# Patient Record
Sex: FEMALE | Race: WHITE | NOT HISPANIC OR LATINO | URBAN - METROPOLITAN AREA
[De-identification: names, ages, dates, MRNs, and addresses within clinical notes are randomized per-mention and may not be internally consistent; named-entity substitution may affect disease eponyms.]

---

## 2017-01-25 DIAGNOSIS — I10 BENIGN ESSENTIAL HYPERTENSION: Primary | ICD-10-CM

## 2017-01-26 RX ORDER — LISINOPRIL 40 MG/1
40 TABLET ORAL DAILY
Qty: 30 TABLET | Refills: 0 | Status: SHIPPED
Start: 2017-01-26 | End: 2017-04-10

## 2017-02-03 ENCOUNTER — ALLIED HEALTH/NURSE VISIT (OUTPATIENT)
Dept: FAMILY MEDICINE | Facility: CLINIC | Age: 37
End: 2017-02-03

## 2017-02-03 VITALS
DIASTOLIC BLOOD PRESSURE: 86 MMHG | BODY MASS INDEX: 54.42 KG/M2 | SYSTOLIC BLOOD PRESSURE: 135 MMHG | TEMPERATURE: 98 F | WEIGHT: 293 LBS

## 2017-02-03 NOTE — NURSING NOTE
I administered the following to Ella Briggs.    MEDICATION: Medroxyprogesterone 150 mg  ROUTE: IM  SITE: Deltoid - Right  DOSE: 150 mg per mL  LOT #: [id number]  :  "[employer]   EXPIRATION DATE:  04/2020  NDC#: [address]-2     Was entire vial of medication used? Yes    Name of provider who requested the injection: Dr. Vargas  Name of provider on site at the time of performing the injection: Dr. Tristen Rajan, [facility]    Next Depo Due April 21st - May 5th 2017 Reminder Card was given to patient

## 2017-02-06 ENCOUNTER — TELEPHONE (OUTPATIENT)
Dept: FAMILY MEDICINE | Facility: CLINIC | Age: 37
End: 2017-02-06

## 2017-02-06 NOTE — TELEPHONE ENCOUNTER
Received message from Ms. Briggs requesting call from me (per Dr. Jasso). The message from Dr. Jasso indicated that she wanted to discuss a concern related to her significant other (Vinod, with whom I have provided consultation in the past).  I have also provided brief consultation to Ms. Briggs in the past, but not since 11/15/13. Reached busy signal on cell phone number.  Left brief, nondescript message on home number (from [facility]) that I was returning her call and that she could call back if she would like.

## 2017-03-14 DIAGNOSIS — I10 BENIGN ESSENTIAL HYPERTENSION: ICD-10-CM

## 2017-03-15 RX ORDER — HYDROCHLOROTHIAZIDE 25 MG/1
25 TABLET ORAL DAILY
Qty: 30 TABLET | Refills: 0 | Status: SHIPPED | OUTPATIENT
Start: 2017-03-15 | End: 2017-04-10

## 2017-04-10 DIAGNOSIS — I10 BENIGN ESSENTIAL HYPERTENSION: ICD-10-CM

## 2017-04-12 RX ORDER — HYDROCHLOROTHIAZIDE 25 MG/1
25 TABLET ORAL DAILY
Qty: 30 TABLET | Refills: 0 | Status: SHIPPED | OUTPATIENT
Start: 2017-04-12 | End: 2017-05-26

## 2017-04-12 RX ORDER — LISINOPRIL 40 MG/1
40 TABLET ORAL DAILY
Qty: 30 TABLET | Refills: 0 | Status: SHIPPED | OUTPATIENT
Start: 2017-04-12 | End: 2017-05-26

## 2017-04-21 ENCOUNTER — ALLIED HEALTH/NURSE VISIT (OUTPATIENT)
Dept: FAMILY MEDICINE | Facility: CLINIC | Age: 37
End: 2017-04-21

## 2017-04-21 VITALS
SYSTOLIC BLOOD PRESSURE: 132 MMHG | DIASTOLIC BLOOD PRESSURE: 84 MMHG | BODY MASS INDEX: 54.06 KG/M2 | WEIGHT: 293 LBS | HEART RATE: 108 BPM | TEMPERATURE: 97.7 F

## 2017-04-21 NOTE — NURSING NOTE
The following medication was given:     MEDICATION: Medroxyprogesterone 150 mg  ROUTE: IM  SITE: Deltoid - Left  DOSE: 1ml  LOT #: [id number]  :  [employer]   EXPIRATION DATE:  12/2020  NDC#: [address]-6   Medication administered by: Hernan Hollingsworth CMA  Pt on time, depo was given. Pt was told to schedule appt to see her PCP before her next depo in July, pt understood. Next depo due is 7/7/17-7/21/17. Reminder card was given to pt.     Dr. English was available on site at the time of this service.

## 2017-04-21 NOTE — MR AVS SNAPSHOT
After Visit Summary   2017    Ella Briggs    MRN: [id number]           Patient Information     Date Of Birth          1980        Visit Information        Provider Department      2017 4:15 PM Nurse, [facility]        Today's Diagnoses     Contraception    -  1       Follow-ups after your visit        Who to contact     Please call your clinic at [phone] to:    Ask questions about your health    Make or cancel appointments    Discuss your medicines    Learn about your test results    Speak to your doctor   If you have compliments or concerns about an experience at your clinic, or if you wish to file a complaint, please contact [facility] Patient Relations at [phone] or email us at [email or web address]         Additional Information About Your Visit        MyChart Information     Enpirion is an electronic gateway that provides easy, online access to your medical records. With Enpirion, you can request a clinic appointment, read your test results, renew a prescription or communicate with your care team.     To sign up for Aproposet visit the website at [email or web address] [email or web address]   You will be asked to enter the access code listed below, as well as some personal information. Please follow the directions to create your username and password.     Your access code is: [email or web address]  Expires: 2017  4:33 PM     Your access code will  in 90 days. If you need help or a new code, please contact your [facility] or call [phone] for assistance.        Care EveryWhere ID     This is your Care EveryWhere ID. This could be used by other organizations to access your [place] medical records  [id number]        Your Vitals Were     Pulse Temperature BMI (Body Mass Index)             108 97.7  F (36.5  C) (Oral) 54.06 kg/m2          Blood Pressure from Last 3 Encounters:   17 132/84   17  135/86   11/08/16 127/90    Weight from Last 3 Encounters:   04/21/17 (!) 336 lb 6.4 oz (152.6 kg)   02/03/17 (!) 338 lb 9.6 oz (153.6 kg)   11/08/16 (!) 340 lb (154.2 kg)              We Performed the Following     INJECTION INTRAMUSCULAR OR SUB-Q     medroxyPROGESTERone (DEPO-PROVERA) injection 150 mg (Charge)        Primary Care Provider Office Phone # Fax #    Ousmane Torrez -[phone]       [address] [address]        Thank you!     Thank you for choosing [facility]  for your care. Our goal is always to provide you with excellent care. Hearing back from our patients is one way we can continue to improve our services. Please take a few minutes to complete the written survey that you may receive in the mail after your visit with us. Thank you!             Your Updated Medication List - Protect others around you: Learn how to safely use, store and throw away your medicines at www.disposemymeds.org.          This list is accurate as of: 4/21/17  4:33 PM.  Always use your most recent med list.                   Brand Name Dispense Instructions for use    eucerin cream     500 g    Apply  topically as needed for dry skin.       FLEXERIL PO      Take as needed as directed       hydrochlorothiazide 25 MG tablet    HYDRODIURIL    30 tablet    Take 1 tablet (25 mg) by mouth daily       IBUPROFEN PO          lisinopril 40 MG tablet    PRINIVIL/ZESTRIL    30 tablet    Take 1 tablet (40 mg) by mouth daily       medroxyPROGESTERone 150 MG/ML injection    DEPO-PROVERA    0.9 mL    Inject 1 mL into the muscle every 3 months.       order for DME     1 Device    Equipment being ordered: LARGE bp cuff       ranitidine 150 MG tablet    ZANTAC    180 tablet    Take 1 tablet (150 mg) by mouth 2 times daily

## 2017-05-26 DIAGNOSIS — I10 BENIGN ESSENTIAL HYPERTENSION: ICD-10-CM

## 2017-05-26 RX ORDER — LISINOPRIL 40 MG/1
40 TABLET ORAL DAILY
Qty: 30 TABLET | Refills: 0 | Status: SHIPPED | OUTPATIENT
Start: 2017-05-26 | End: 2017-07-19

## 2017-05-26 RX ORDER — HYDROCHLOROTHIAZIDE 25 MG/1
25 TABLET ORAL DAILY
Qty: 30 TABLET | Refills: 0 | Status: SHIPPED | OUTPATIENT
Start: 2017-05-26 | End: 2017-07-19

## 2017-07-19 ENCOUNTER — OFFICE VISIT (OUTPATIENT)
Dept: FAMILY MEDICINE | Facility: CLINIC | Age: 37
End: 2017-07-19

## 2017-07-19 VITALS
TEMPERATURE: 98.7 F | OXYGEN SATURATION: 99 % | BODY MASS INDEX: 54.16 KG/M2 | HEART RATE: 84 BPM | WEIGHT: 293 LBS | SYSTOLIC BLOOD PRESSURE: 115 MMHG | DIASTOLIC BLOOD PRESSURE: 82 MMHG

## 2017-07-19 DIAGNOSIS — Z30.42 ENCOUNTER FOR SURVEILLANCE OF INJECTABLE CONTRACEPTIVE: ICD-10-CM

## 2017-07-19 DIAGNOSIS — I10 BENIGN ESSENTIAL HYPERTENSION: ICD-10-CM

## 2017-07-19 DIAGNOSIS — G89.29 CHRONIC LOW BACK PAIN, UNSPECIFIED BACK PAIN LATERALITY, WITH SCIATICA PRESENCE UNSPECIFIED: Primary | ICD-10-CM

## 2017-07-19 DIAGNOSIS — M54.5 CHRONIC LOW BACK PAIN, UNSPECIFIED BACK PAIN LATERALITY, WITH SCIATICA PRESENCE UNSPECIFIED: Primary | ICD-10-CM

## 2017-07-19 DIAGNOSIS — E66.9 OBESITY, UNSPECIFIED OBESITY SEVERITY, UNSPECIFIED OBESITY TYPE: ICD-10-CM

## 2017-07-19 RX ORDER — LISINOPRIL 40 MG/1
20 TABLET ORAL DAILY
Qty: 45 TABLET | Refills: 1 | Status: SHIPPED | OUTPATIENT
Start: 2017-07-19 | End: 2018-02-08

## 2017-07-19 RX ORDER — PHENTERMINE HYDROCHLORIDE 15 MG/1
15 CAPSULE ORAL EVERY MORNING
Qty: 30 CAPSULE | Refills: 0 | Status: SHIPPED | OUTPATIENT
Start: 2017-07-19 | End: 2017-10-11

## 2017-07-19 RX ORDER — ACETAMINOPHEN 325 MG/1
325 TABLET ORAL EVERY 4 HOURS PRN
Qty: 100 TABLET | Refills: 0 | Status: SHIPPED | OUTPATIENT
Start: 2017-07-19 | End: 2017-11-03

## 2017-07-19 RX ORDER — HYDROCHLOROTHIAZIDE 25 MG/1
25 TABLET ORAL DAILY
Qty: 30 TABLET | Refills: 0 | Status: CANCELLED | OUTPATIENT
Start: 2017-07-19

## 2017-07-19 NOTE — NURSING NOTE
I administered the following to Ella Briggs.    MEDICATION: Depo Provera 150mg  ROUTE: IM  SITE: Deltoid - Right  DOSE: 150 mg  LOT #: [id number]  :  [employer]   EXPIRATION DATE:  01/2022  NDC#: [address]-8   Next injection due Oct 4 - 18, 2017. Reminder card given.  Was entire vial of medication used? Yes  Name of provider who requested the injection: Dr. English  Name of provider on site (faculty or community preceptor) at the time of performing the injection: Dr. Amador Briceño, [facility]

## 2017-07-19 NOTE — MR AVS SNAPSHOT
After Visit Summary   7/19/2017    Ella Briggs    MRN: [id number]           Patient Information     Date Of Birth          1980        Visit Information        Provider Department      7/19/2017 10:00 AM Nydia English MD [facility]        Today's Diagnoses     Chronic low back pain, unspecified back pain laterality, with sciatica presence unspecified    -  1    Benign essential hypertension        Obesity, unspecified obesity severity, unspecified obesity type          Care Instructions    Blood pressure: Goal less than 140/90  -- Try stopping the HCTZ (hydrochlorothiazide).  Take just the lisinopril half-pill (20 mg).  -- If your blood pressure goes above goal, then we have to add the HCTZ back in again.  -- If it stay under goal, then you'll just need the lisinopril.    Back pain:  Tell the  that you need a 40 minute chronic pain management (CPM) assessment with me.          Follow-ups after your visit        Who to contact     Please call your clinic at [phone] to:    Ask questions about your health    Make or cancel appointments    Discuss your medicines    Learn about your test results    Speak to your doctor   If you have compliments or concerns about an experience at your clinic, or if you wish to file a complaint, please contact [facility] Patient Relations at [phone] or email us at [email or web address]         Additional Information About Your Visit        MyChart Information     Beyond Gamest is an electronic gateway that provides easy, online access to your medical records. With Advanced Brain Monitoring, you can request a clinic appointment, read your test results, renew a prescription or communicate with your care team.     To sign up for Beyond Gamest visit the website at [email or web address]   You will be asked to enter the access code listed below, as well as some personal information. Please follow the directions to create your  username and password.     Your access code is: [email or web address]  Expires: 2017  4:33 PM     Your access code will  in 90 days. If you need help or a new code, please contact your [facility] or call [phone] for assistance.        Care EveryWhere ID     This is your Care EveryWhere ID. This could be used by other organizations to access your [place] medical records  [id number]        Your Vitals Were     Pulse Temperature Pulse Oximetry Breastfeeding? BMI (Body Mass Index)       84 98.7  F (37.1  C) (Oral) 99% No 54.16 kg/m2        Blood Pressure from Last 3 Encounters:   17 115/82   17 132/84   17 135/86    Weight from Last 3 Encounters:   17 (!) 337 lb (152.9 kg)   17 (!) 336 lb 6.4 oz (152.6 kg)   17 (!) 338 lb 9.6 oz (153.6 kg)              Today, you had the following     No orders found for display         Today's Medication Changes          These changes are accurate as of: 17 10:58 AM.  If you have any questions, ask your nurse or doctor.               Start taking these medicines.        Dose/Directions    acetaminophen 325 MG tablet   Commonly known as:  TYLENOL   Used for:  Chronic low back pain, unspecified back pain laterality, with sciatica presence unspecified   Started by:  Nydia English MD        Dose:  325 mg   Take 1 tablet (325 mg) by mouth every 4 hours as needed for mild pain   Quantity:  100 tablet   Refills:  0       phentermine 15 MG capsule   Used for:  Obesity, unspecified obesity severity, unspecified obesity type   Started by:  Nydia English MD        Dose:  15 mg   Take 1 capsule (15 mg) by mouth every morning   Quantity:  30 capsule   Refills:  0         Stop taking these medicines if you haven't already. Please contact your care team if you have questions.     hydrochlorothiazide 25 MG tablet   Commonly known as:  HYDRODIURIL   Stopped by:  Nydia English MD                Where to  get your medicines      [facility] [place] [place] - [address], [place] [address]     Phone:  [phone]     acetaminophen 325 MG tablet    lisinopril 40 MG tablet         Some of these will need a paper prescription and others can be bought over the counter.  Ask your nurse if you have questions.     Bring a paper prescription for each of these medications     phentermine 15 MG capsule                Primary Care Provider Office Phone # Fax #    Mau Jonathon Sosa [phone]       [facility] MEDICINE [address] [place] [address]        Equal Access to Services     [facility] CenterYENIFER : Hadii aad ku hadasho Soomaali, waaxda luqadaha, qaybta kaalmada adeegyada, junior watkins . So [facility] [phone].    ATENCIÓN: Si habla español, tiene a marlow disposición servicios gratuitos de asistencia lingüística. [facility] [phone].    We comply with applicable federal civil rights laws and [place] laws. We do not discriminate on the basis of race, color, national origin, age, disability sex, sexual orientation or gender identity.            Thank you!     Thank you for choosing [facility]  for your care. Our goal is always to provide you with excellent care. Hearing back from our patients is one way we can continue to improve our services. Please take a few minutes to complete the written survey that you may receive in the mail after your visit with us. Thank you!             Your Updated Medication List - Protect others around you: Learn how to safely use, store and throw away your medicines at www.disposemymeds.org.          This list is accurate as of: 7/19/17 10:58 AM.  Always use your most recent med list.                   Brand Name Dispense Instructions for use Diagnosis    acetaminophen 325 MG tablet    TYLENOL    100 tablet    Take 1 tablet (325 mg) by mouth every 4 hours as needed for mild  pain    Chronic low back pain, unspecified back pain laterality, with sciatica presence unspecified       eucerin cream     500 g    Apply  topically as needed for dry skin.    Dry skin       FLEXERIL PO      Take as needed as directed        IBUPROFEN PO           lisinopril 40 MG tablet    PRINIVIL/ZESTRIL    45 tablet    Take 0.5 tablets (20 mg) by mouth daily    Benign essential hypertension       medroxyPROGESTERone 150 MG/ML injection    DEPO-PROVERA    0.9 mL    Inject 1 mL into the muscle every 3 months.    Contraception       order for DME     1 Device    Equipment being ordered: LARGE bp cuff    Elevated blood pressure reading without diagnosis of hypertension       phentermine 15 MG capsule     30 capsule    Take 1 capsule (15 mg) by mouth every morning    Obesity, unspecified obesity severity, unspecified obesity type       ranitidine 150 MG tablet    ZANTAC    180 tablet    Take 1 tablet (150 mg) by mouth 2 times daily    Gastroesophageal reflux disease without esophagitis

## 2017-07-19 NOTE — PATIENT INSTRUCTIONS
Blood pressure: Goal less than 140/90  -- Try stopping the HCTZ (hydrochlorothiazide).  Take just the lisinopril half-pill (20 mg).  -- If your blood pressure goes above goal, then we have to add the HCTZ back in again.  -- If it stay under goal, then you'll just need the lisinopril.    Back pain:  Tell the  that you need a 40 minute chronic pain management (CPM) assessment with me.

## 2017-07-21 ENCOUNTER — TELEPHONE (OUTPATIENT)
Dept: FAMILY MEDICINE | Facility: CLINIC | Age: 37
End: 2017-07-21

## 2017-07-21 NOTE — TELEPHONE ENCOUNTER
PA needed for phentermine.   Would you like to fill out PA or send alternative?    Routed to Dr. English. /DEEPIKA Hannah

## 2017-07-25 NOTE — PROGRESS NOTES
Subjective   Ella Briggs is a 37 year old female with a history including hypertension and morbid obesity who presents for a blood pressure check.    Her blood pressure today is 115/82. Her home regimen is prescribed as HCTZ 25 and lisinopril 40. However, she found that this combination made her feel lightheaded, as if she might faint, so she is actually taking HCTZ 25 and half a pill of lisinopril (20 mg).  She has increased her exercise -- walking every day and doing exercise videos. She is taking over-the-counter diet pills and detoxifiers.  At home, her blood pressure is usually in the 110s/80s.  No chest pain, headache, or vision changes.    Today, she is also due for another Depo injection. Last injection was 4/21/17, so she is within the window today.    Finally, she also complains of some chronic back pain since 2014, with two MRIs previously done. She says that she was told she had degenerative spine disease. She has recently received injections, muscle relaxants, and Percocet.  No incontinence or worsening of chronic symptoms.  No fevers.    Social: Former smoker.    Objective   Vitals: /82  Pulse 84  Temp 98.7  F (37.1  C) (Oral)  Wt (!) 337 lb (152.9 kg)  SpO2 99%  Breastfeeding? No  BMI 54.16 kg/m2  General: Pleasant. Young woman. Obese. No distress.  HEENT: Normal fundoscopic exam.  Heart: Regular rate and rhythm. No murmurs, rubs, or gallops.  Extremities: No peripheral edema.  Lungs: Clear to auscultation bilaterally. No wheezes or crackles. Good air movement.    Labs reviewed:  Results for orders placed or performed in visit on 09/14/16   Basic Metabolic Profile ([facility])   Result Value Ref Range    Sodium 138 136 - 145 mmol/L    Potassium 3.7 3.5 - 5.0 mmol/L    Chloride 102 98 - 107 mmol/L    CO2, Total 24 22 - 31 mmol/L    Anion Gap 12 5 - 18 mmol/L    Glucose 100 70 - 125 mg/dL    Calcium 9.6 8.5 - 10.5 mg/dL    Urea Nitrogen 14 8 - 22 mg/dL    Creatinine 0.87 0.60 - 1.10  mg/dL    GFR Estimate If Black >60 >60 mL/min/1.73m2    GFR Estimate >60 >60 mL/min/1.73m2    Narrative    Test performed by:  [facility]  [address] [place] [address]  Fasting Glucose reference range is 70-99 mg/dL per  American Diabetes Association (ADA) guidelines.       Assessment & Plan   Routine blood pressure follow-up, with blood pressure today 115/82.  Currently taking HCTZ 25 and lisinopril 20.  -- Continue lisinopril 20. Discontinue HCTZ 25, given good pressures (no adverse effects).  If this results in BP above goal (140/90), then restart HCTZ.    Chronic low back pain.  -- Schedule visit for chronic pain managment and assessment.  -- Continue Tylenol as needed.  -- Obesity management.  Currently taking OTC diet pills.  Stop those, and will prescribe phentermine instead.  Start at 15 mg.    Return to clinic for CPM visit and BP recheck on med changes.  Check BMP then.

## 2017-08-27 ENCOUNTER — HEALTH MAINTENANCE LETTER (OUTPATIENT)
Age: 37
End: 2017-08-27

## 2017-10-11 ENCOUNTER — OFFICE VISIT (OUTPATIENT)
Dept: FAMILY MEDICINE | Facility: CLINIC | Age: 37
End: 2017-10-11

## 2017-10-11 VITALS
HEART RATE: 109 BPM | TEMPERATURE: 97.8 F | SYSTOLIC BLOOD PRESSURE: 135 MMHG | WEIGHT: 293 LBS | DIASTOLIC BLOOD PRESSURE: 81 MMHG | BODY MASS INDEX: 55.96 KG/M2

## 2017-10-11 DIAGNOSIS — Z30.42 ENCOUNTER FOR SURVEILLANCE OF INJECTABLE CONTRACEPTIVE: Primary | ICD-10-CM

## 2017-10-11 DIAGNOSIS — I10 BENIGN ESSENTIAL HYPERTENSION: ICD-10-CM

## 2017-10-11 DIAGNOSIS — R63.2 BINGE EATING: ICD-10-CM

## 2017-10-11 LAB
BUN SERPL-MCNC: 7.7 MG/DL (ref 7–19)
CALCIUM SERPL-MCNC: 9.2 MG/DL (ref 8.5–10.1)
CHLORIDE SERPLBLD-SCNC: 102.6 MMOL/L (ref 98–110)
CO2 SERPL-SCNC: 26.7 MMOL/L (ref 20–32)
CREAT SERPL-MCNC: 0.7 MG/DL (ref 0.5–1)
GFR SERPL CREATININE-BSD FRML MDRD: >90 ML/MIN/1.7 M2
GLUCOSE SERPL-MCNC: 114.1 MG'DL (ref 70–99)
POTASSIUM SERPL-SCNC: 3.8 MMOL/DL (ref 3.2–4.6)
SODIUM SERPL-SCNC: 137 MMOL/L (ref 132–142)

## 2017-10-11 RX ORDER — TOPIRAMATE 25 MG/1
25 TABLET, FILM COATED ORAL 2 TIMES DAILY
Qty: 60 TABLET | Refills: 1 | Status: SHIPPED | OUTPATIENT
Start: 2017-10-11 | End: 2017-11-07

## 2017-10-11 NOTE — LETTER
October 12, 2017      Ella Briggs  [address] [facility] [address]        Dear Ella,    Dear Ella,     We rechecked your sodium, potassium, kidneys because it's been more than a year, and we need to check this yearly for people on lisinopril.  Your results all looked good.  Your sugar was a little high (it was 114), but that could be up if you weren't fasting.     You don't need to make any changes to your medicine.     Nydia English MD     Please see below for your test results.    Resulted Orders   Basic Metabolic Panel ([facility])   Result Value Ref Range    Urea Nitrogen 7.7 7.0 - 19.0 mg/dL    Calcium 9.2 8.5 - 10.1 mg/dL    Chloride 102.6 98.0 - 110.0 mmol/L    Carbon Dioxide 26.7 20.0 - 32.0 mmol/L    Creatinine 0.7 0.5 - 1.0 mg/dL    Glucose 114.1 (H) 70.0 - 99.0 mg'dL    Potassium 3.8 3.2 - 4.6 mmol/dL    Sodium 137.0 132.0 - 142.0 mmol/L    GFR Estimate >90 >60.0 mL/min/1.7 m2    GFR Estimate If Black >90 >60.0 mL/min/1.7 m2

## 2017-10-11 NOTE — NURSING NOTE
I administered the following to Ella Briggs.    MEDICATION: Medroxyprogesterone 150 mg  ROUTE: IM  SITE: Deltoid - Right  DOSE: 150 mg/ mL  LOT #: [id number]  :  [employer]   EXPIRATION DATE:  01/2022  NDC#: [address]-2     Gave patient a reminder card to come back December 28, 2017 through January 10, 2018.     Was entire vial of medication used? Yes    Name of provider who requested the injection: Dr. English  Name of provider on site (faculty or community preceptor) at the time of performing the injection: Dr. Amador Traore, [facility]

## 2017-10-11 NOTE — MR AVS SNAPSHOT
After Visit Summary   10/11/2017    Ella Briggs    MRN: [id number]           Patient Information     Date Of Birth          1980        Visit Information        Provider Department      10/11/2017 3:10 PM Nydia English MD [facility]        Today's Diagnoses     Encounter for surveillance of injectable contraceptive    -  1    Binge eating        Benign essential hypertension           Follow-ups after your visit        Who to contact     Please call your clinic at [phone] to:    Ask questions about your health    Make or cancel appointments    Discuss your medicines    Learn about your test results    Speak to your doctor   If you have compliments or concerns about an experience at your clinic, or if you wish to file a complaint, please contact [facility] Patient Relations at [phone] or email us at [email or web address]         Additional Information About Your Visit        MyChart Information     HIRO Media is an electronic gateway that provides easy, online access to your medical records. With HIRO Media, you can request a clinic appointment, read your test results, renew a prescription or communicate with your care team.     To sign up for Micromusclet visit the website at [email or web address]   You will be asked to enter the access code listed below, as well as some personal information. Please follow the directions to create your username and password.     Your access code is: [email or web address]  Expires: 1/15/2018 10:30 PM     Your access code will  in 90 days. If you need help or a new code, please contact your [facility] or call [phone] for assistance.        Care EveryWhere ID     This is your Care EveryWhere ID. This could be used by other organizations to access your [place] medical records  [id number]        Your Vitals Were     Pulse Temperature BMI (Body Mass Index)             109 97.8   F (36.6  C) (Oral) 55.96 kg/m2          Blood Pressure from Last 3 Encounters:   10/11/17 135/81   07/19/17 115/82   04/21/17 132/84    Weight from Last 3 Encounters:   10/11/17 (!) 348 lb 3.2 oz (157.9 kg)   07/19/17 (!) 337 lb (152.9 kg)   04/21/17 (!) 336 lb 6.4 oz (152.6 kg)              We Performed the Following     Basic Metabolic Panel ([place])     INJECTION INTRAMUSCULAR OR SUB-Q     medroxyPROGESTERone (DEPO-PROVERA) injection 150 mg (Charge)          Today's Medication Changes          These changes are accurate as of: 10/11/17 11:59 PM.  If you have any questions, ask your nurse or doctor.               Start taking these medicines.        Dose/Directions    topiramate 25 MG tablet   Commonly known as:  TOPAMAX   Used for:  Binge eating   Started by:  Nydia English MD        Dose:  25 mg   Take 1 tablet (25 mg) by mouth 2 times daily   Quantity:  60 tablet   Refills:  1         Stop taking these medicines if you haven't already. Please contact your care team if you have questions.     IBUPROFEN PO   Stopped by:  Nydia English MD           phentermine 15 MG capsule   Stopped by:  Nydia English MD                Where to get your medicines      These medications were sent to [facility] - [place], [place] - [address], [place] [address]     Phone:  [phone]     topiramate 25 MG tablet                Primary Care Provider Office Phone # Fax #    Nydia English [phone]       [facility] [address] [place] [address]        Equal Access to Services     DAYNE SPARROW AH: Hadmagali Leon, agnes clement, qajunior simon. So [facility] [phone].    ATENCIÓN: Si habla español, tiene a marlow disposición servicios gratuitos de asistencia lingüística. Dong al [phone].    We comply with applicable federal civil rights laws and [place] laws. We do not  discriminate on the basis of race, color, national origin, age, disability, sex, sexual orientation, or gender identity.            Thank you!     Thank you for choosing [facility]  for your care. Our goal is always to provide you with excellent care. Hearing back from our patients is one way we can continue to improve our services. Please take a few minutes to complete the written survey that you may receive in the mail after your visit with us. Thank you!             Your Updated Medication List - Protect others around you: Learn how to safely use, store and throw away your medicines at www.disposemymeds.org.          This list is accurate as of: 10/11/17 11:59 PM.  Always use your most recent med list.                   Brand Name Dispense Instructions for use Diagnosis    acetaminophen 325 MG tablet    TYLENOL    100 tablet    Take 1 tablet (325 mg) by mouth every 4 hours as needed for mild pain    Chronic low back pain, unspecified back pain laterality, with sciatica presence unspecified       eucerin cream     500 g    Apply  topically as needed for dry skin.    Dry skin       FLEXERIL PO      Take as needed as directed        lisinopril 40 MG tablet    PRINIVIL/ZESTRIL    45 tablet    Take 0.5 tablets (20 mg) by mouth daily    Benign essential hypertension       medroxyPROGESTERone 150 MG/ML injection    DEPO-PROVERA    0.9 mL    Inject 1 mL into the muscle every 3 months.    Contraception       order for DME     1 Device    Equipment being ordered: LARGE bp cuff    Elevated blood pressure reading without diagnosis of hypertension       ranitidine 150 MG tablet    ZANTAC    180 tablet    Take 1 tablet (150 mg) by mouth 2 times daily    Gastroesophageal reflux disease without esophagitis       topiramate 25 MG tablet    TOPAMAX    60 tablet    Take 1 tablet (25 mg) by mouth 2 times daily    Binge eating

## 2017-10-11 NOTE — PROGRESS NOTES
Subjective   Ella Briggs is a 37 year old female with a history including obesity and HTN who presents for blood pressure recheck and to discuss weight loss.    Regarding her blood pressure, she is currently on lisinopril 20 mg daily.  This was decreased from lisinopril + HCTZ 25 at her last visit (HCTZ discontinued) because it seemed she only needed on medication.  Since this change, she feels like her blood pressures remains well-controlled.  She checks them at home, and highest is 130s/80s.  She denies headaches, vision changes, or chest pain.    BP Readings from Last 6 Encounters:   10/11/17 135/81   07/19/17 115/82   04/21/17 132/84   02/03/17 135/86   11/08/16 127/90   09/14/16 133/88     Regarding her weight, she has morbid obesity and she is motivated to lose weight via non-surgical options.  At the last visit, we started her on phentermine, but insurance would not cover it, so she never filled it.  Her weight is up another 10 lbs since the last visit 3 months ago.  She endorses problems with binge eating.    Wt Readings from Last 5 Encounters:   10/11/17 (!) 348 lb 3.2 oz (157.9 kg)   07/19/17 (!) 337 lb (152.9 kg)   04/21/17 (!) 336 lb 6.4 oz (152.6 kg)   02/03/17 (!) 338 lb 9.6 oz (153.6 kg)   11/08/16 (!) 340 lb (154.2 kg)       Social: Former smoker.    Objective   Vitals: /81  Pulse 109  Temp 97.8  F (36.6  C) (Oral)  Wt (!) 348 lb 3.2 oz (157.9 kg)  BMI 55.96 kg/m2  General: Pleasant. Young woman. Obese. No distress.  Heart: Regular rate and rhythm. No murmurs, rubs, or gallops.  Extremities: Extremities warm and well-perfused. No peripheral edema.  Lungs: Clear to auscultation bilaterally. No wheezes or crackles. Good air movement.    Labs:  Results for orders placed or performed in visit on 10/11/17   Basic Metabolic Panel ([place])   Result Value Ref Range    Urea Nitrogen 7.7 7.0 - 19.0 mg/dL    Calcium 9.2 8.5 - 10.1 mg/dL    Chloride 102.6 98.0 - 110.0 mmol/L    Carbon Dioxide  26.7 20.0 - 32.0 mmol/L    Creatinine 0.7 0.5 - 1.0 mg/dL    Glucose 114.1 (H) 70.0 - 99.0 mg'dL    Potassium 3.8 3.2 - 4.6 mmol/dL    Sodium 137.0 132.0 - 142.0 mmol/L    GFR Estimate >90 >60.0 mL/min/1.7 m2    GFR Estimate If Black >90 >60.0 mL/min/1.7 m2       Assessment & Plan   Follow-up BP.  On lisinopril 20.  HCTZ 25 discontinued at last visit.  BP remains well-controlled on pared back regimen.  Electrolytes normal on BMP.  -- Continue lisinopril 20.    Morbid obesity with binge eating component.  Phentermine not covered by insurance.  -- Start torpiramate 25 mg daily, titrating up to 25 mg BID.    Contraception.  -- Depo given today.    Return to clinic as needed.

## 2017-11-03 DIAGNOSIS — M54.5 CHRONIC LOW BACK PAIN, UNSPECIFIED BACK PAIN LATERALITY, WITH SCIATICA PRESENCE UNSPECIFIED: ICD-10-CM

## 2017-11-03 DIAGNOSIS — G89.29 CHRONIC LOW BACK PAIN, UNSPECIFIED BACK PAIN LATERALITY, WITH SCIATICA PRESENCE UNSPECIFIED: ICD-10-CM

## 2017-11-06 RX ORDER — ACETAMINOPHEN 325 MG/1
325 TABLET ORAL EVERY 4 HOURS PRN
Qty: 100 TABLET | Refills: 0 | Status: SHIPPED | OUTPATIENT
Start: 2017-11-06 | End: 2017-11-13

## 2017-11-07 DIAGNOSIS — R63.2 BINGE EATING: ICD-10-CM

## 2017-11-07 RX ORDER — TOPIRAMATE 25 MG/1
25 TABLET, FILM COATED ORAL 2 TIMES DAILY
Qty: 60 TABLET | Refills: 1 | Status: SHIPPED | OUTPATIENT
Start: 2017-11-07 | End: 2017-11-09

## 2017-11-09 DIAGNOSIS — R63.2 BINGE EATING: ICD-10-CM

## 2017-11-10 RX ORDER — TOPIRAMATE 25 MG/1
25 TABLET, FILM COATED ORAL 2 TIMES DAILY
Qty: 60 TABLET | Refills: 1 | Status: SHIPPED | OUTPATIENT
Start: 2017-11-10 | End: 2018-01-03

## 2017-11-13 DIAGNOSIS — G89.29 CHRONIC LOW BACK PAIN, UNSPECIFIED BACK PAIN LATERALITY, WITH SCIATICA PRESENCE UNSPECIFIED: ICD-10-CM

## 2017-11-13 DIAGNOSIS — M54.5 CHRONIC LOW BACK PAIN, UNSPECIFIED BACK PAIN LATERALITY, WITH SCIATICA PRESENCE UNSPECIFIED: ICD-10-CM

## 2017-11-14 RX ORDER — ACETAMINOPHEN 325 MG/1
325 TABLET ORAL EVERY 4 HOURS PRN
Qty: 100 TABLET | Refills: 0 | Status: SHIPPED | OUTPATIENT
Start: 2017-11-14 | End: 2018-05-15

## 2017-12-07 DIAGNOSIS — B85.2 LICE: Primary | ICD-10-CM

## 2018-01-03 DIAGNOSIS — R63.2 BINGE EATING: ICD-10-CM

## 2018-01-03 RX ORDER — TOPIRAMATE 25 MG/1
25 TABLET, FILM COATED ORAL 2 TIMES DAILY
Qty: 60 TABLET | Refills: 1 | Status: SHIPPED | OUTPATIENT
Start: 2018-01-03 | End: 2018-02-12

## 2018-01-09 ENCOUNTER — ALLIED HEALTH/NURSE VISIT (OUTPATIENT)
Dept: FAMILY MEDICINE | Facility: CLINIC | Age: 38
End: 2018-01-09
Payer: COMMERCIAL

## 2018-01-09 ENCOUNTER — TELEPHONE (OUTPATIENT)
Dept: FAMILY MEDICINE | Facility: CLINIC | Age: 38
End: 2018-01-09

## 2018-01-09 DIAGNOSIS — Z30.42 ENCOUNTER FOR SURVEILLANCE OF INJECTABLE CONTRACEPTIVE: ICD-10-CM

## 2018-01-09 NOTE — MR AVS SNAPSHOT
After Visit Summary   2018    Ella Briggs    MRN: [id number]           Patient Information     Date Of Birth          1980        Visit Information        Provider Department      2018 1:45 PM Nurse, [facility]        Today's Diagnoses     Contraception           Follow-ups after your visit        Who to contact     Please call your clinic at [phone] to:    Ask questions about your health    Make or cancel appointments    Discuss your medicines    Learn about your test results    Speak to your doctor   If you have compliments or concerns about an experience at your clinic, or if you wish to file a complaint, please contact [facility] Patient Relations at [phone] or email us at [email or web address]         Additional Information About Your Visit        MyChart Information     [employer] is an electronic gateway that provides easy, online access to your medical records. With [employer], you can request a clinic appointment, read your test results, renew a prescription or communicate with your care team.     To sign up for Jetabroadt visit the website at [email or web address]   You will be asked to enter the access code listed below, as well as some personal information. Please follow the directions to create your username and password.     Your access code is: [email or web address]  Expires: 1/15/2018  9:30 PM     Your access code will  in 90 days. If you need help or a new code, please contact your [facility] or call [phone] for assistance.        Care EveryWhere ID     This is your Care EveryWhere ID. This could be used by other organizations to access your [facility] medical records  [id number]        Your Vitals Were     Pulse Temperature Pulse Oximetry BMI (Body Mass Index)          88 98  F (36.7  C) 98% 55.28 kg/m2         Blood Pressure from Last 3 Encounters:   18 124/89   10/11/17  135/81   07/19/17 115/82    Weight from Last 3 Encounters:   01/09/18 (!) 344 lb (156 kg)   10/11/17 (!) 348 lb 3.2 oz (157.9 kg)   07/19/17 (!) 337 lb (152.9 kg)              We Performed the Following     INJECTION INTRAMUSCULAR OR SUB-Q     medroxyPROGESTERone (DEPO-PROVERA) injection 150 mg (Charge)        Primary Care Provider Office Phone # Fax #    Nydia POP English [phone]       [facility] [address] [place] [address]        Equal Access to Services     DAYNE SPARROW : Hadii marky Leon, waletyda urbano, qakathyta kaalmada ada, junior watkins . So [facility] [phone].    ATENCIÓN: Si habla español, tiene a marlow disposición servicios gratuitos de asistencia lingüística. Llame al [phone].    We comply with applicable federal civil rights laws and [place] laws. We do not discriminate on the basis of race, color, national origin, age, disability, sex, sexual orientation, or gender identity.            Thank you!     Thank you for choosing [facility]  for your care. Our goal is always to provide you with excellent care. Hearing back from our patients is one way we can continue to improve our services. Please take a few minutes to complete the written survey that you may receive in the mail after your visit with us. Thank you!             Your Updated Medication List - Protect others around you: Learn how to safely use, store and throw away your medicines at www.disposemymeds.org.          This list is accurate as of: 1/9/18 11:59 PM.  Always use your most recent med list.                   Brand Name Dispense Instructions for use Diagnosis    acetaminophen 325 MG tablet    TYLENOL    100 tablet    Take 1 tablet (325 mg) by mouth every 4 hours as needed for mild pain    Chronic low back pain, unspecified back pain laterality, with sciatica presence unspecified       eucerin cream     500 g    Apply  topically as needed for  dry skin.    Dry skin       FLEXERIL PO      Take as needed as directed        lisinopril 40 MG tablet    PRINIVIL/ZESTRIL    45 tablet    Take 0.5 tablets (20 mg) by mouth daily    Benign essential hypertension       medroxyPROGESTERone 150 MG/ML injection    DEPO-PROVERA    0.9 mL    Inject 1 mL into the muscle every 3 months.    Contraception       order for DME     1 Device    Equipment being ordered: LARGE bp cuff    Elevated blood pressure reading without diagnosis of hypertension       permethrin 1 % Liqd     60 mL    Apply to clean, towel-dried hair, saturate hair and scalp, wash off after 10 min.    Lice       ranitidine 150 MG tablet    ZANTAC    180 tablet    Take 1 tablet (150 mg) by mouth 2 times daily    Gastroesophageal reflux disease without esophagitis       topiramate 25 MG tablet    TOPAMAX    60 tablet    Take 1 tablet (25 mg) by mouth 2 times daily    Binge eating

## 2018-01-11 VITALS
SYSTOLIC BLOOD PRESSURE: 124 MMHG | TEMPERATURE: 98 F | BODY MASS INDEX: 55.28 KG/M2 | DIASTOLIC BLOOD PRESSURE: 89 MMHG | WEIGHT: 293 LBS | HEART RATE: 88 BPM | OXYGEN SATURATION: 98 %

## 2018-01-11 RX ORDER — MEDROXYPROGESTERONE ACETATE 150 MG/ML
150 INJECTION, SUSPENSION INTRAMUSCULAR
Qty: 0.9 ML | Refills: 3 | OUTPATIENT
Start: 2018-01-11 | End: 2021-08-03

## 2018-01-11 RX ORDER — MEDROXYPROGESTERONE ACETATE 150 MG/ML
150 INJECTION, SUSPENSION INTRAMUSCULAR
Qty: 0.9 ML | Status: CANCELLED | OUTPATIENT
Start: 2018-01-11

## 2018-01-11 NOTE — NURSING NOTE
The following medication was given:     MEDICATION: Medroxyprogesterone 150 mg  ROUTE: IM  SITE: RUQ - Gluteus  DOSE: 150mg  LOT #: [id number]  :  [employer]   EXPIRATION DATE:  01/2022  NDC#: [address]-6   Medication administered by: Lottie Livingston CMA    Pt is on time for her Depo.  Depo given.  Reminder card given and patient to return 3/27/18 - 4/24/18.    Dr. Khan was available on site at the time of this service.

## 2018-02-08 DIAGNOSIS — I10 BENIGN ESSENTIAL HYPERTENSION: ICD-10-CM

## 2018-02-08 RX ORDER — LISINOPRIL 40 MG/1
20 TABLET ORAL DAILY
Qty: 45 TABLET | Refills: 1 | Status: SHIPPED | OUTPATIENT
Start: 2018-02-08 | End: 2018-08-07

## 2018-02-12 ENCOUNTER — OFFICE VISIT (OUTPATIENT)
Dept: FAMILY MEDICINE | Facility: CLINIC | Age: 38
End: 2018-02-12
Payer: COMMERCIAL

## 2018-02-12 VITALS
TEMPERATURE: 98.4 F | HEART RATE: 79 BPM | WEIGHT: 293 LBS | DIASTOLIC BLOOD PRESSURE: 85 MMHG | OXYGEN SATURATION: 96 % | BODY MASS INDEX: 54.32 KG/M2 | SYSTOLIC BLOOD PRESSURE: 130 MMHG

## 2018-02-12 DIAGNOSIS — R63.2 BINGE EATING: ICD-10-CM

## 2018-02-12 RX ORDER — TOPIRAMATE 50 MG/1
50 TABLET, FILM COATED ORAL 2 TIMES DAILY
Qty: 60 TABLET | Refills: 1 | Status: SHIPPED | OUTPATIENT
Start: 2018-02-12 | End: 2018-04-05
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
## 2018-02-12 NOTE — MR AVS SNAPSHOT
After Visit Summary   2018    Ella Briggs    MRN: [id number]           Patient Information     Date Of Birth          1980        Visit Information        Provider Department      2018 10:40 AM Nydia English MD [facility]        Today's Diagnoses     Binge eating           Follow-ups after your visit        Your next 10 appointments already scheduled     Mar 09, 2018  1:30 PM CST   Return Visit with Nydia English MD   [facility] ([facility])    [address] [place] [address]   [phone]              Who to contact     Please call your clinic at [phone] to:    Ask questions about your health    Make or cancel appointments    Discuss your medicines    Learn about your test results    Speak to your doctor            Additional Information About Your Visit        MyChart Information     [employer] is an electronic gateway that provides easy, online access to your medical records. With [employer], you can request a clinic appointment, read your test results, renew a prescription or communicate with your care team.     To sign up for [employer] visit the website at [email or web address]   You will be asked to enter the access code listed below, as well as some personal information. Please follow the directions to create your username and password.     Your access code is: [email or web address]  Expires: 2018  8:29 PM     Your access code will  in 90 days. If you need help or a new code, please contact your [facility] or call [phone] for assistance.        Care EveryWhere ID     This is your Care EveryWhere ID. This could be used by other organizations to access your [facility] medical records  [id number]        Your Vitals Were     Pulse Temperature Pulse Oximetry BMI (Body Mass Index)          79 98.4  F (36.9  C) (Oral) 96% 54.32 kg/m2         Blood Pressure from Last 3 Encounters:   18 (!) 126/92    02/12/18 130/85   01/09/18 124/89    Weight from Last 3 Encounters:   02/23/18 (!) 339 lb 12.8 oz (154.1 kg)   02/12/18 (!) 338 lb (153.3 kg)   01/09/18 (!) 344 lb (156 kg)              Today, you had the following     No orders found for display         Today's Medication Changes          These changes are accurate as of 2/12/18 11:59 PM.  If you have any questions, ask your nurse or doctor.               These medicines have changed or have updated prescriptions.        Dose/Directions    topiramate 50 MG tablet   Commonly known as:  TOPAMAX   This may have changed:    - medication strength  - how much to take   Used for:  Binge eating   Changed by:  Nydia English MD        Dose:  50 mg   Take 1 tablet (50 mg) by mouth 2 times daily   Quantity:  60 tablet   Refills:  1            Where to get your medicines      These medications were sent to [facility] - [place], [place] - [address], [place] [address]     Phone:  [phone]     topiramate 50 MG tablet                Primary Care Provider Office Phone # Fax #    Nydia English [phone]       [facility] [address] [place] [address]        Equal Access to Services     TONYA SPARROW AH: Hadii marky ku hadasho Soomaali, waaxda luqadaha, qaybta kaalmada adeegyada, waxay johanne rodrigues. So [facility] [phone].    ATENCIÓN: Si habla español, tiene a marlow disposición servicios gratuitos de asistencia lingüística. Llame al [phone].    We comply with applicable federal civil rights laws and [place] laws. We do not discriminate on the basis of race, color, national origin, age, disability, sex, sexual orientation, or gender identity.            Thank you!     Thank you for choosing [facility]  for your care. Our goal is always to provide you with excellent care. Hearing back from our patients is one way we can continue to improve our services. Please take a few  minutes to complete the written survey that you may receive in the mail after your visit with us. Thank you!             Your Updated Medication List - Protect others around you: Learn how to safely use, store and throw away your medicines at www.disposemymeds.org.          This list is accurate as of 2/12/18 11:59 PM.  Always use your most recent med list.                   Brand Name Dispense Instructions for use Diagnosis    acetaminophen 325 MG tablet    TYLENOL    100 tablet    Take 1 tablet (325 mg) by mouth every 4 hours as needed for mild pain    Chronic low back pain, unspecified back pain laterality, with sciatica presence unspecified       eucerin cream     500 g    Apply  topically as needed for dry skin.    Dry skin       FLEXERIL PO      Take as needed as directed        lisinopril 40 MG tablet    PRINIVIL/ZESTRIL    45 tablet    Take 0.5 tablets (20 mg) by mouth daily    Benign essential hypertension       medroxyPROGESTERone 150 MG/ML injection    DEPO-PROVERA    0.9 mL    Inject 1 mL (150 mg) into the muscle every 3 months    Encounter for surveillance of injectable contraceptive       order for DME     1 Device    Equipment being ordered: LARGE bp cuff    Elevated blood pressure reading without diagnosis of hypertension       permethrin 1 % Liqd     60 mL    Apply to clean, towel-dried hair, saturate hair and scalp, wash off after 10 min.    Lice       ranitidine 150 MG tablet    ZANTAC    180 tablet    Take 1 tablet (150 mg) by mouth 2 times daily    Gastroesophageal reflux disease without esophagitis       topiramate 50 MG tablet    TOPAMAX    60 tablet    Take 1 tablet (50 mg) by mouth 2 times daily    Binge eating

## 2018-02-23 ENCOUNTER — OFFICE VISIT (OUTPATIENT)
Dept: FAMILY MEDICINE | Facility: CLINIC | Age: 38
End: 2018-02-23
Payer: COMMERCIAL

## 2018-02-23 VITALS
TEMPERATURE: 98.2 F | HEIGHT: 66 IN | HEART RATE: 79 BPM | OXYGEN SATURATION: 95 % | WEIGHT: 293 LBS | BODY MASS INDEX: 47.09 KG/M2 | DIASTOLIC BLOOD PRESSURE: 92 MMHG | SYSTOLIC BLOOD PRESSURE: 126 MMHG

## 2018-02-23 DIAGNOSIS — L91.8 SKIN TAG: Primary | ICD-10-CM

## 2018-02-23 NOTE — MR AVS SNAPSHOT
"              After Visit Summary   2018    Ella Briggs    MRN: [id number]           Patient Information     Date Of Birth          1980        Visit Information        Provider Department      2018 10:40 Nydia Nieto MD [facility]        Today's Diagnoses     Skin tag    -  1       Follow-ups after your visit        Your next 10 appointments already scheduled     Mar 09, 2018  1:30 PM CST   Return Visit with Nydia English MD   [facility] ([facility])    [address] [place] [address]   [phone]              Who to contact     Please call your clinic at [phone] to:    Ask questions about your health    Make or cancel appointments    Discuss your medicines    Learn about your test results    Speak to your doctor            Additional Information About Your Visit        MyChart Information     IF[employer] is an electronic gateway that provides easy, online access to your medical records. With [employer], you can request a clinic appointment, read your test results, renew a prescription or communicate with your care team.     To sign up for IFTTTt visit the website at [email or web address]   You will be asked to enter the access code listed below, as well as some personal information. Please follow the directions to create your username and password.     Your access code is: [email or web address]  Expires: 2018  8:29 PM     Your access code will  in 90 days. If you need help or a new code, please contact your [facility] or call [phone] for assistance.        Care EveryWhere ID     This is your Care EveryWhere ID. This could be used by other organizations to access your [place] medical records  [id number]        Your Vitals Were     Pulse Temperature Height Pulse Oximetry BMI (Body Mass Index)       79 98.2  F (36.8  C) (Oral) 5' 6.25\" (168.3 cm) 95% 54.43 kg/m2        Blood Pressure from Last 3 Encounters: "   02/23/18 (!) 126/92   02/12/18 130/85   01/09/18 124/89    Weight from Last 3 Encounters:   02/23/18 (!) 339 lb 12.8 oz (154.1 kg)   02/12/18 (!) 338 lb (153.3 kg)   01/09/18 (!) 344 lb (156 kg)              We Performed the Following     REMOVAL OF SKIN TAGS, FIRST 15          Today's Medication Changes          These changes are accurate as of 2/23/18 11:59 PM.  If you have any questions, ask your nurse or doctor.               Stop taking these medicines if you haven't already. Please contact your care team if you have questions.     permethrin 1 % Liqd   Stopped by:  Nydia English MD                    Primary Care Provider Office Phone # Fax #    Nydia English [phone]       [facility] [address] [place] [address]        Equal Access to Services     DAYNE SPARROW : Hadii aad ku hadasho Sofreddy, waaxda luqadaha, qaybta kaalmada adeegyada, junior watkins . So [facility] [phone].    ATENCIÓN: Si habla español, tiene a marlow disposición servicios gratuitos de asistencia lingüística. Llame al [phone].    We comply with applicable federal civil rights laws and [place] laws. We do not discriminate on the basis of race, color, national origin, age, disability, sex, sexual orientation, or gender identity.            Thank you!     Thank you for choosing [facility]  for your care. Our goal is always to provide you with excellent care. Hearing back from our patients is one way we can continue to improve our services. Please take a few minutes to complete the written survey that you may receive in the mail after your visit with us. Thank you!             Your Updated Medication List - Protect others around you: Learn how to safely use, store and throw away your medicines at www.disposemymeds.org.          This list is accurate as of 2/23/18 11:59 PM.  Always use your most recent med list.                   Brand Name Dispense  Instructions for use Diagnosis    acetaminophen 325 MG tablet    TYLENOL    100 tablet    Take 1 tablet (325 mg) by mouth every 4 hours as needed for mild pain    Chronic low back pain, unspecified back pain laterality, with sciatica presence unspecified       eucerin cream     500 g    Apply  topically as needed for dry skin.    Dry skin       FLEXERIL PO      Take as needed as directed        lisinopril 40 MG tablet    PRINIVIL/ZESTRIL    45 tablet    Take 0.5 tablets (20 mg) by mouth daily    Benign essential hypertension       medroxyPROGESTERone 150 MG/ML injection    DEPO-PROVERA    0.9 mL    Inject 1 mL (150 mg) into the muscle every 3 months    Encounter for surveillance of injectable contraceptive       order for DME     1 Device    Equipment being ordered: LARGE bp cuff    Elevated blood pressure reading without diagnosis of hypertension       ranitidine 150 MG tablet    ZANTAC    180 tablet    Take 1 tablet (150 mg) by mouth 2 times daily    Gastroesophageal reflux disease without esophagitis       topiramate 50 MG tablet    TOPAMAX    60 tablet    Take 1 tablet (50 mg) by mouth 2 times daily    Binge eating

## 2018-03-03 NOTE — PROGRESS NOTES
"Subjective   Ella Briggs is a 37 year old female with a history including obesity and HTN who presents for skin tag removal.  The skin tag in in the center of her back, and it gets caught on things - bra, hair, etc.  In the past, it started bleeding and fell off spontaneously after some hair wrapped around it, but it has returned, again with some instances of bleeding if injured.  When it gets irritated, it is then tender.    Social: Former smoker.    Objective   Vitals: BP (!) 126/92  Pulse 79  Temp 98.2  F (36.8  C) (Oral)  Ht 5' 6.25\" (168.3 cm)  Wt (!) 339 lb 12.8 oz (154.1 kg)  SpO2 95%  BMI 54.43 kg/m2    Procedure: Skin tag removal  Location: back  After discussing risks and benefits, the patient provided informed consent.  Time-out for patient identification was completed prior to the procedure.  The site was cleaned with betadine, and the base of the skin tag was injected with 0.5 ml of lidocaine 1%.  The skin tag was removed using a razor blade; it measured 7 mm in diameter.  Hemostasis was obtained simply with direct pressure.  The procedure was well-tolerated without bleeding or complication.  The site was dressed with gauze dressing.  The specimen was not sent for pathology.    Assessment & Plan   Removal of irritated skin tag from back, uncomplicated.    Return to clinic as needed.    "

## 2018-03-09 ENCOUNTER — OFFICE VISIT (OUTPATIENT)
Dept: FAMILY MEDICINE | Facility: CLINIC | Age: 38
End: 2018-03-09
Payer: COMMERCIAL

## 2018-03-09 VITALS
TEMPERATURE: 98.1 F | BODY MASS INDEX: 54.5 KG/M2 | WEIGHT: 293 LBS | HEART RATE: 85 BPM | DIASTOLIC BLOOD PRESSURE: 87 MMHG | SYSTOLIC BLOOD PRESSURE: 129 MMHG

## 2018-03-09 DIAGNOSIS — M53.3 PAIN IN THE COCCYX: Primary | ICD-10-CM

## 2018-03-09 DIAGNOSIS — G89.4 CHRONIC PAIN SYNDROME: ICD-10-CM

## 2018-03-09 LAB
AMPHETAMINES QUAL: NEGATIVE
BARBITURATES QUAL URINE: NEGATIVE
BENZODIAZEPINE QUAL URINE: NEGATIVE
BUPRENORPHINE QUAL URINE: NEGATIVE
CANNABINOIDS UR QL SCN: POSITIVE
COCAINE QUAL URINE: NEGATIVE
METHAMPHETAMINE: NEGATIVE
METHODONE QUAL: NEGATIVE
MORPHINE QUAL: NEGATIVE
OXYCODONE QUAL: NEGATIVE
TEMPERATURE OF URINE WAS BETWEEN 90-100 DEGREES F: YES

## 2018-03-09 RX ORDER — CYCLOBENZAPRINE HCL 10 MG
10 TABLET ORAL 3 TIMES DAILY PRN
Qty: 45 TABLET | Refills: 1 | Status: SHIPPED | OUTPATIENT
Start: 2018-03-09 | End: 2018-04-05

## 2018-03-09 RX ORDER — IBUPROFEN 400 MG/1
400 TABLET, FILM COATED ORAL EVERY 6 HOURS PRN
Qty: 120 TABLET | Refills: 1 | Status: SHIPPED | OUTPATIENT
Start: 2018-03-09 | End: 2018-04-05
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
## 2018-03-09 NOTE — PATIENT INSTRUCTIONS
Schedule an appointment with behavioral health (Dr Sequeira, Dr Lombardi, or Dr. Jean).and then we'll have done all the assessment.  Follow-up with me 1 week after your behavioral health.     Understanding Coccygodynia    Coccygodynia is pain at the lowest tip of the spine (the coccyx, or tailbone). This is sometimes called a  bruised tailbone.  Tailbone pain can be very uncomfortable. It can also interfere with daily activities, such as driving.     How to say it  [id number]   What causes coccygodynia?  Causes of tailbone pain include:    Injury to the tailbone from a blow or fall    A bone spur on the tailbone    Poor posture while sitting    Sitting for a long time in an uncomfortable position    Vaginal childbirth    Arthritis  In some cases, the cause of the pain can t be found.  Symptoms of coccygodynia  You may feel:    A dull ache or sharp pain in the tailbone area, near the top of the buttocks    Muscle spasms in the lower back or pelvic area    A sense of pressure in the rectum  Pain may be triggered by things like walking or standing up from sitting. It may hurt more if you sit for long periods. Things that put pressure on the tailbone, such as riding a horse or having sex, may also trigger the pain.  Treatment for coccygodynia  Tailbone pain often goes away by itself within a few months. Treatment focuses on reducing pain and protecting the tailbone. Treatments can include:    Over-the-counter or prescription pain medicine to help relieve pain and swelling    Warm or cold to help relieve symptoms for a time, such as a heating pad, hot water bottle, or ice pack    Keeping pressure off the tailbone to help the area heal by shifting weight forward onto your hipbones and thighs when sitting or sitting on a special cushion    Medicine injected into the area to help relieve severe symptoms    Physical therapy to help strengthen the structures around the tailbone  If no other treatments work, you  may need surgery to remove all or part of the coccyx.     When to call your healthcare provider  Call your healthcare provider right away if you have any of these:    Pain that continues for more than 2 months or gets worse    Pain that limits your usual activities    Pain that doesn t get better by trying the self-care treatments described above    Fever of 100.4 F (38 C) or higher, or as directed    Redness or swelling    A new sore in the cleft of the buttocks, especially one that contains or drains pus    Other new symptoms   Date Last Reviewed: 3/10/2016    0975-2529 [employer]. [address] [place] [address]. All rights reserved. This information is not intended as a substitute for professional medical care. Always follow your healthcare professional's instructions.

## 2018-03-09 NOTE — MR AVS SNAPSHOT
After Visit Summary   3/9/2018    Ella Briggs    MRN: [id number]           Patient Information     Date Of Birth          1980        Visit Information        Provider Department      3/9/2018 1:30 PM Nydia English MD [facility]        Today's Diagnoses     Pain in the coccyx    -  1    Chronic pain syndrome          Care Instructions      Schedule an appointment with behavioral health (Dr Sequeira, Dr Lombardi, or Dr. Jean).and then we'll have done all the assessment.  Follow-up with me 1 week after your behavioral health.     Understanding Coccygodynia    Coccygodynia is pain at the lowest tip of the spine (the coccyx, or tailbone). This is sometimes called a  bruised tailbone.  Tailbone pain can be very uncomfortable. It can also interfere with daily activities, such as driving.     How to say it  [id number]   What causes coccygodynia?  Causes of tailbone pain include:    Injury to the tailbone from a blow or fall    A bone spur on the tailbone    Poor posture while sitting    Sitting for a long time in an uncomfortable position    Vaginal childbirth    Arthritis  In some cases, the cause of the pain can t be found.  Symptoms of coccygodynia  You may feel:    A dull ache or sharp pain in the tailbone area, near the top of the buttocks    Muscle spasms in the lower back or pelvic area    A sense of pressure in the rectum  Pain may be triggered by things like walking or standing up from sitting. It may hurt more if you sit for long periods. Things that put pressure on the tailbone, such as riding a horse or having sex, may also trigger the pain.  Treatment for coccygodynia  Tailbone pain often goes away by itself within a few months. Treatment focuses on reducing pain and protecting the tailbone. Treatments can include:    Over-the-counter or prescription pain medicine to help relieve pain and swelling    Warm or cold to help relieve symptoms for a time, such as a  heating pad, hot water bottle, or ice pack    Keeping pressure off the tailbone to help the area heal by shifting weight forward onto your hipbones and thighs when sitting or sitting on a special cushion    Medicine injected into the area to help relieve severe symptoms    Physical therapy to help strengthen the structures around the tailbone  If no other treatments work, you may need surgery to remove all or part of the coccyx.     When to call your healthcare provider  Call your healthcare provider right away if you have any of these:    Pain that continues for more than 2 months or gets worse    Pain that limits your usual activities    Pain that doesn t get better by trying the self-care treatments described above    Fever of 100.4 F (38 C) or higher, or as directed    Redness or swelling    A new sore in the cleft of the buttocks, especially one that contains or drains pus    Other new symptoms   Date Last Reviewed: 3/10/2016    0810-3789 [employer]. [address] [place] [address]. All rights reserved. This information is not intended as a substitute for professional medical care. Always follow your healthcare professional's instructions.                Follow-ups after your visit        Who to contact     Please call your clinic at [phone] to:    Ask questions about your health    Make or cancel appointments    Discuss your medicines    Learn about your test results    Speak to your doctor            Additional Information About Your Visit        MyChart Information     [employer] is an electronic gateway that provides easy, online access to your medical records. With [employer], you can request a clinic appointment, read your test results, renew a prescription or communicate with your care team.     To sign up for in[employer] visit the website at [email or web address]   You will be asked to enter the access code listed below, as well as some personal information. Please follow the  directions to create your username and password.     Your access code is: [email or web address]  Expires: 2018  8:29 PM     Your access code will  in 90 days. If you need help or a new code, please contact your [facility] or call [phone] for assistance.        Care EveryWhere ID     This is your Care EveryWhere ID. This could be used by other organizations to access your [place] medical records  [id number]        Your Vitals Were     Pulse Temperature BMI (Body Mass Index)             85 98.1  F (36.7  C) (Oral) 54.5 kg/m2          Blood Pressure from Last 3 Encounters:   18 129/87   18 (!) 126/92   18 130/85    Weight from Last 3 Encounters:   18 (!) 340 lb 3.2 oz (154.3 kg)   18 (!) 339 lb 12.8 oz (154.1 kg)   18 (!) 338 lb (153.3 kg)              We Performed the Following     Rapid Urine Drug Screen ([facility])          Today's Medication Changes          These changes are accurate as of 3/9/18  2:24 PM.  If you have any questions, ask your nurse or doctor.               Start taking these medicines.        Dose/Directions    ibuprofen 400 MG tablet   Commonly known as:  ADVIL/MOTRIN   Used for:  Pain in the coccyx   Started by:  Nydia English MD        Dose:  400 mg   Take 1 tablet (400 mg) by mouth every 6 hours as needed for moderate pain   Quantity:  120 tablet   Refills:  1         These medicines have changed or have updated prescriptions.        Dose/Directions    cyclobenzaprine 10 MG tablet   Commonly known as:  FLEXERIL   This may have changed:    - medication strength  - how much to take  - how to take this  - when to take this  - reasons to take this  - additional instructions   Used for:  Pain in the coccyx   Changed by:  Nydia English MD        Dose:  10 mg   Take 1 tablet (10 mg) by mouth 3 times daily as needed for muscle spasms   Quantity:  45 tablet   Refills:  1       * order for DME   This may have changed:   Another medication with the same name was added. Make sure you understand how and when to take each.   Used for:  Elevated blood pressure reading without diagnosis of hypertension   Changed by:  Nydia English MD        Equipment being ordered: LARGE bp cuff   Quantity:  1 Device   Refills:  0       * order for DME   This may have changed:  You were already taking a medication with the same name, and this prescription was added. Make sure you understand how and when to take each.   Used for:  Pain in the coccyx   Changed by:  Nydia English MD        Equipment being ordered: wedge cushion   Quantity:  1 each   Refills:  0       * Notice:  This list has 2 medication(s) that are the same as other medications prescribed for you. Read the directions carefully, and ask your doctor or other care provider to review them with you.         Where to get your medicines      These medications were sent to [facility] - [place], [place] - [address], [place] [address]     Phone:  [phone]     cyclobenzaprine 10 MG tablet    ibuprofen 400 MG tablet         Some of these will need a paper prescription and others can be bought over the counter.  Ask your nurse if you have questions.     Bring a paper prescription for each of these medications     order for DME                Primary Care Provider Office Phone # Fax #    Nydia English -[phone]       [facility] [address] [place] [address]        Equal Access to Services     TONYA SPARROW AH: Esteban schaffer hadasho Soomaali, waaxda luqadaha, qaybta kaalmada adeegyada, junior whiting haydoris rodrigues. So [facility] [phone].    ATENCIÓN: Si habla español, tiene a marlow disposición servicios gratuitos de asistencia lingüística. Llame al [phone].    We comply with applicable federal civil rights laws and [place] laws. We do not discriminate on the basis of race, color, national origin,  age, disability, sex, sexual orientation, or gender identity.            Thank you!     Thank you for choosing [facility]  for your care. Our goal is always to provide you with excellent care. Hearing back from our patients is one way we can continue to improve our services. Please take a few minutes to complete the written survey that you may receive in the mail after your visit with us. Thank you!             Your Updated Medication List - Protect others around you: Learn how to safely use, store and throw away your medicines at www.disposemymeds.org.          This list is accurate as of 3/9/18  2:24 PM.  Always use your most recent med list.                   Brand Name Dispense Instructions for use Diagnosis    acetaminophen 325 MG tablet    TYLENOL    100 tablet    Take 1 tablet (325 mg) by mouth every 4 hours as needed for mild pain    Chronic low back pain, unspecified back pain laterality, with sciatica presence unspecified       cyclobenzaprine 10 MG tablet    FLEXERIL    45 tablet    Take 1 tablet (10 mg) by mouth 3 times daily as needed for muscle spasms    Pain in the coccyx       eucerin cream     500 g    Apply  topically as needed for dry skin.    Dry skin       ibuprofen 400 MG tablet    ADVIL/MOTRIN    120 tablet    Take 1 tablet (400 mg) by mouth every 6 hours as needed for moderate pain    Pain in the coccyx       lisinopril 40 MG tablet    PRINIVIL/ZESTRIL    45 tablet    Take 0.5 tablets (20 mg) by mouth daily    Benign essential hypertension       medroxyPROGESTERone 150 MG/ML injection    DEPO-PROVERA    0.9 mL    Inject 1 mL (150 mg) into the muscle every 3 months    Encounter for surveillance of injectable contraceptive       * order for DME     1 Device    Equipment being ordered: LARGE bp cuff    Elevated blood pressure reading without diagnosis of hypertension       * order for DME     1 each    Equipment being ordered: wedge cushion    Pain in the coccyx       ranitidine 150 MG  tablet    ZANTAC    180 tablet    Take 1 tablet (150 mg) by mouth 2 times daily    Gastroesophageal reflux disease without esophagitis       topiramate 50 MG tablet    TOPAMAX    60 tablet    Take 1 tablet (50 mg) by mouth 2 times daily    Binge eating       * Notice:  This list has 2 medication(s) that are the same as other medications prescribed for you. Read the directions carefully, and ask your doctor or other care provider to review them with you.

## 2018-03-09 NOTE — LETTER
March 26, 2018      Ella Briggs  [address] [place] [address]        Dear Ella,    Please see below for your test results.  Your urine drug screen showed THC (cannabinoids) but no other illegal or non-prescribed medications/drugs.     Resulted Orders   Rapid Urine Drug Screen ([facility])   Result Value Ref Range    Amphetamines Qual NEGATIVE NEGATIVE    Barbiturates Qual Urine NEGATIVE NEGATIVE    Buprenorphine Qual Urine NEGATIVE NEGATIVE    Benzodiazepine Qual Urine NEGATIVE NEGATIVE    Cocaine Qual Urine NEGATIVE NEGATIVE    Cannabinoids Qual Urine POSITIVE (A) NEGATIVE    Methamphetamine Qual NEGATIVE NEGATIVE    Methadone Qual NEGATIVE NEGATIVE    Morphine Qual NEGATIVE NEGATIVE    Oxycodone Qual NEGATIVE NEGATIVE    Temperature of Urine was Between  Degrees F YES YES      Comment:      This is a preliminary screening test that detects drugs-of-abuse in urine at   specified detection levels.  To confirm preliminary results, a more specific   method such as Gas Chromatography/Mass Spectrometry (GC/MS) must be used.            If you have any questions, please call the clinic to make an appointment.    Sincerely,    Nydia English MD

## 2018-03-13 NOTE — PROGRESS NOTES
Dear Ella,    Your urine drug screen showed THC (cannabinoids) but no other illegal or non-prescribed medications/drugs.    Nydia English MD

## 2018-03-23 ENCOUNTER — OFFICE VISIT (OUTPATIENT)
Dept: PSYCHOLOGY | Facility: CLINIC | Age: 38
End: 2018-03-23
Payer: COMMERCIAL

## 2018-03-23 DIAGNOSIS — G89.4 CHRONIC PAIN SYNDROME: Primary | ICD-10-CM

## 2018-03-23 NOTE — PATIENT INSTRUCTIONS
Return to see Dr. Sequeira on Friday, April 6 at 11:00am to complete assessment and care plan.    Personal Care Plan for Chronic Pain    1.  Personal Goals:     1.  I'd like to be working again.   2.  I'd like to be able to go to the park and play with my kids.   3.  I want to have a satisfying intimate relationship with my partner.   4.  I want to be able to participate in daily activities like cooking, shopping, cleaning.   5.  I'd like to have decreased intensity of pain.    2.  Sleep:     *  Basic sleep plan:    *reduce or eliminate caffeine and daytime naps    * relaxation before bed    * limit screen time 1-2 hours prior to bed    * establish dark/quiet sleep environment    * go to bed at target bedtime:  10 pm    * keep consistent wake time:  8:00 am.   *  Nighttime medications including the following: flexeril, ibuprofen - will discuss further with Dr. English    3.  Physical Activity:     * Formal physical rehabilitation:    * Physical therapy - has completed 6 week course in the past.  May consider for future, but not right now    * Pool therapy - will discuss referral with Dr. English   * Home/community based activity:    * Daily stretching:  Leg lift exercises from past physical therapy 3 days per week.   * Listen to your body.  Pace yourself for success.  Don't over-do it.  Don't under do it.   * Balance activities with rest and set realistic goals.     4.  Nutrition/Weight:     * Try to eat at least 5 servings of fresh fruits and vegetables each day.   * Limit processed foods and foods high in sugar, sodium and fat.   * Maintain a healthy weight.     5.  Mood/Stress Management:    * Formal interventions:    * Counseling  * Support group or therapy group   * Home/community based interventions:  * Relaxation techniques  * Meditation  * Yoga  * Creative activity  * Spiritual /prayer  * Service-based activity.   * Medications: none    6.  Tobacco/Alcohol/Drug Use:      * Keep up the great tobacco  free lifestyle!   * Maintain healthy relationship with alcohol    * For women this would be no more than 1 drink per day    * For men, this would be no more than 2 drinks per day   * Eliminate recreational drugs    7.  Pain:     * Non-medication treatments:    * ice/heat    * massage    * acupuncture    * chiropractor    8.  Pain Medications: As prescribed by Dr. English

## 2018-03-23 NOTE — MR AVS SNAPSHOT
After Visit Summary   3/23/2018    Ella Briggs    MRN: [id number]           Patient Information     Date Of Birth          1980        Visit Information        Provider Department      3/23/2018 10:00 AM Johana Sequeira, PhD [facility]        Care Instructions    Return to see Dr. Sequeira on Friday, April 6 at 11:00am to complete assessment and care plan.    Personal Care Plan for Chronic Pain    1.  Personal Goals:     1.  I'd like to be working again.   2.  I'd like to be able to go to the park and play with my kids.   3.  I want to have a satisfying intimate relationship with my partner.   4.  I want to be able to participate in daily activities like cooking, shopping, cleaning.   5.  I'd like to have decreased intensity of pain.    2.  Sleep:     *  Basic sleep plan:    *reduce or eliminate caffeine and daytime naps    * relaxation before bed    * limit screen time 1-2 hours prior to bed    * establish dark/quiet sleep environment    * go to bed at target bedtime:  10 pm    * keep consistent wake time:  8:00 am.   *  Nighttime medications including the following: flexeril, ibuprofen - will discuss further with Dr. English    3.  Physical Activity:     * Formal physical rehabilitation:    * Physical therapy - has completed 6 week course in the past.  May consider for future, but not right now    * Pool therapy - will discuss referral with Dr. English   * Home/community based activity:    * Daily stretching:  Leg lift exercises from past physical therapy 3 days per week.   * Listen to your body.  Pace yourself for success.  Don't over-do it.  Don't under do it.   * Balance activities with rest and set realistic goals.     4.  Nutrition/Weight:     * Try to eat at least 5 servings of fresh fruits and vegetables each day.   * Limit processed foods and foods high in sugar, sodium and fat.   * Maintain a healthy weight.     5.  Mood/Stress Management:    * Formal interventions:    *  Counseling  * Support group or therapy group   * Home/community based interventions:  * Relaxation techniques  * Meditation  * Yoga  * Creative activity  * Spiritual /prayer  * Service-based activity.   * Medications: none    6.  Tobacco/Alcohol/Drug Use:      * Keep up the great tobacco free lifestyle!   * Maintain healthy relationship with alcohol    * For women this would be no more than 1 drink per day    * For men, this would be no more than 2 drinks per day   * Eliminate recreational drugs    7.  Pain:     * Non-medication treatments:    * ice/heat    * massage    * acupuncture    * chiropractor    8.  Pain Medications: As prescribed by Dr. English              Follow-ups after your visit        Your next 10 appointments already scheduled     2018 10:00 AM CDT   Return Visit with Nydia English MD   [facility]    [address] [place] [address]   [phone]              Who to contact     Please call your clinic at [phone] to:    Ask questions about your health    Make or cancel appointments    Discuss your medicines    Learn about your test results    Speak to your doctor            Additional Information About Your Visit        S[employer]iQuest Information     [employer] is an electronic gateway that provides easy, online access to your medical records. With [employer]Quest, you can request a clinic appointment, read your test results, renew a prescription or communicate with your care team.     To sign up for S[employer]iQuest visit the website at [email or web address]   You will be asked to enter the access code listed below, as well as some personal information. Please follow the directions to create your username and password.     Your access code is: [email or web address]  Expires: 2018  9:29 PM     Your access code will  in 90 days. If you need help or a new code, please contact your [facility] or call [phone] for assistance.         Care EveryWhere ID     This is your Care EveryWhere ID. This could be used by other organizations to access your [place] medical records  [id number]         Blood Pressure from Last 3 Encounters:   03/09/18 129/87   02/23/18 (!) 126/92   02/12/18 130/85    Weight from Last 3 Encounters:   03/09/18 (!) 340 lb 3.2 oz (154.3 kg)   02/23/18 (!) 339 lb 12.8 oz (154.1 kg)   02/12/18 (!) 338 lb (153.3 kg)              Today, you had the following     No orders found for display       Primary Care Provider Office Phone # Fax #    Nydia LORD MD [place] [phone]       [facility] [address] [place] [address]        Equal Access to Services     TONYA SPARROW : Hadii marky carmichaelo Sofreddy, waaxda luqadaha, qaybta kaalmada adeegyasadiq, junior rodrigues. So [facility] [phone].    ATENCIÓN: Si habla español, tiene a marlow disposición servicios gratuitos de asistencia lingüística. Dong al [phone].    We comply with applicable federal civil rights laws and [place] laws. We do not discriminate on the basis of race, color, national origin, age, disability, sex, sexual orientation, or gender identity.            Thank you!     Thank you for choosing [facility]  for your care. Our goal is always to provide you with excellent care. Hearing back from our patients is one way we can continue to improve our services. Please take a few minutes to complete the written survey that you may receive in the mail after your visit with us. Thank you!             Your Updated Medication List - Protect others around you: Learn how to safely use, store and throw away your medicines at www.disposemymeds.org.          This list is accurate as of 3/23/18 11:11 AM.  Always use your most recent med list.                   Brand Name Dispense Instructions for use Diagnosis    acetaminophen 325 MG tablet    TYLENOL    100 tablet    Take 1 tablet (325 mg) by mouth every 4 hours as needed  for mild pain    Chronic low back pain, unspecified back pain laterality, with sciatica presence unspecified       cyclobenzaprine 10 MG tablet    FLEXERIL    45 tablet    Take 1 tablet (10 mg) by mouth 3 times daily as needed for muscle spasms    Pain in the coccyx       eucerin cream     500 g    Apply  topically as needed for dry skin.    Dry skin       ibuprofen 400 MG tablet    ADVIL/MOTRIN    120 tablet    Take 1 tablet (400 mg) by mouth every 6 hours as needed for moderate pain    Pain in the coccyx       lisinopril 40 MG tablet    PRINIVIL/ZESTRIL    45 tablet    Take 0.5 tablets (20 mg) by mouth daily    Benign essential hypertension       medroxyPROGESTERone 150 MG/ML injection    DEPO-PROVERA    0.9 mL    Inject 1 mL (150 mg) into the muscle every 3 months    Encounter for surveillance of injectable contraceptive       order for DME     1 Device    Equipment being ordered: LARGE bp cuff    Elevated blood pressure reading without diagnosis of hypertension       order for DME     1 each    Equipment being ordered: wedge cushion    Pain in the coccyx       ranitidine 150 MG tablet    ZANTAC    180 tablet    Take 1 tablet (150 mg) by mouth 2 times daily    Gastroesophageal reflux disease without esophagitis       topiramate 50 MG tablet    TOPAMAX    60 tablet    Take 1 tablet (50 mg) by mouth 2 times daily    Binge eating

## 2018-03-23 NOTE — Clinical Note
Chau English - See my CPM assessment for Ella Briggs.  She is interested in pool therapy and wondering if you would place an order for that.  She is scheduled with you on 4/2 if you would like to wait to place the order until further discussion.  In addition, I am wondering if you have thoughts on how we may help her with sleep which is terrible right now (and terrible for Vinod as well!).  I'll see her back on 4/6 to keep working on assessment and plan.  Let me know if you have any questions or additional follow up for me.  Thanks!  Johana
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
## 2018-04-02 ENCOUNTER — OFFICE VISIT (OUTPATIENT)
Dept: FAMILY MEDICINE | Facility: CLINIC | Age: 38
End: 2018-04-02
Payer: COMMERCIAL

## 2018-04-02 VITALS
SYSTOLIC BLOOD PRESSURE: 123 MMHG | TEMPERATURE: 98.4 F | OXYGEN SATURATION: 97 % | HEART RATE: 92 BPM | BODY MASS INDEX: 45.99 KG/M2 | HEIGHT: 67 IN | DIASTOLIC BLOOD PRESSURE: 86 MMHG | WEIGHT: 293 LBS

## 2018-04-02 DIAGNOSIS — Z30.8 ENCOUNTER FOR OTHER CONTRACEPTIVE MANAGEMENT: ICD-10-CM

## 2018-04-02 DIAGNOSIS — G47.9 SLEEP DIFFICULTIES: ICD-10-CM

## 2018-04-02 DIAGNOSIS — G89.29 CHRONIC LOW BACK PAIN, UNSPECIFIED BACK PAIN LATERALITY, WITH SCIATICA PRESENCE UNSPECIFIED: Primary | ICD-10-CM

## 2018-04-02 DIAGNOSIS — M54.5 CHRONIC LOW BACK PAIN, UNSPECIFIED BACK PAIN LATERALITY, WITH SCIATICA PRESENCE UNSPECIFIED: Primary | ICD-10-CM

## 2018-04-02 RX ORDER — HYDROXYZINE HYDROCHLORIDE 50 MG/1
50-100 TABLET, FILM COATED ORAL
Qty: 60 TABLET | Refills: 1 | Status: SHIPPED | OUTPATIENT
Start: 2018-04-02 | End: 2018-05-07

## 2018-04-02 ASSESSMENT — ANXIETY QUESTIONNAIRES
3. WORRYING TOO MUCH ABOUT DIFFERENT THINGS: MORE THAN HALF THE DAYS
IF YOU CHECKED OFF ANY PROBLEMS ON THIS QUESTIONNAIRE, HOW DIFFICULT HAVE THESE PROBLEMS MADE IT FOR YOU TO DO YOUR WORK, TAKE CARE OF THINGS AT HOME, OR GET ALONG WITH OTHER PEOPLE: SOMEWHAT DIFFICULT
1. FEELING NERVOUS, ANXIOUS, OR ON EDGE: NOT AT ALL
2. NOT BEING ABLE TO STOP OR CONTROL WORRYING: SEVERAL DAYS
GAD7 TOTAL SCORE: 12
7. FEELING AFRAID AS IF SOMETHING AWFUL MIGHT HAPPEN: NOT AT ALL
5. BEING SO RESTLESS THAT IT IS HARD TO SIT STILL: NEARLY EVERY DAY
6. BECOMING EASILY ANNOYED OR IRRITABLE: NEARLY EVERY DAY

## 2018-04-02 ASSESSMENT — PATIENT HEALTH QUESTIONNAIRE - PHQ9: 5. POOR APPETITE OR OVEREATING: NEARLY EVERY DAY

## 2018-04-02 NOTE — NURSING NOTE
I administered the following to Ella Briggs.    MEDICATION: Depo Provera 150mg  ROUTE: IM  SITE: Deltoid - Right  DOSE:150mg/ml  LOT #: [id number]  :  [employer]   EXPIRATION DATE:  05/30/2021  NDC#: [address]-1   Reminder card given to pt to schedule next depo shot between June 18, 2018 - July16, 2018    Was entire vial of medication used? Yes    Name of provider who requested the injection: Nydia French  Name of provider on site (faculty or community preceptor) at the time of performing the injection: Nydia French MA

## 2018-04-02 NOTE — MR AVS SNAPSHOT
After Visit Summary   4/2/2018    Ella Briggs    MRN: [id number]           Patient Information     Date Of Birth          1980        Visit Information        Provider Department      4/2/2018 10:00 AM Nydia English MD [facility]        Today's Diagnoses     Chronic low back pain, unspecified back pain laterality, with sciatica presence unspecified    -  1    Sleep difficulties          Care Instructions    Personal Care Plan for Chronic Pain    2.  Sleep:  Take hydroxyzine before bedtime (~100 mg = 2 tablets)    3.  Physical Activity:      * Pool therapy = 12 sessions     4.  Nutrition/Weight:    Wt Readings from Last 5 Encounters:   04/02/18 (!) 343 lb 6.4 oz (155.8 kg)   03/09/18 (!) 340 lb 3.2 oz (154.3 kg)   02/23/18 (!) 339 lb 12.8 oz (154.1 kg)   02/12/18 (!) 338 lb (153.3 kg)   01/09/18 (!) 344 lb (156 kg)    * topiramate    5.  Mood/Stress Management:    * appt Dr. Sequeira (Fri)    8.  Pain Medications: ?? medical marijuana            Follow-ups after your visit        Additional Services     PHYSICAL THERAPY REFERRAL       PT/OT REFERRAL  Ella Briggs  1980  Phone #: [phone] (home)    needed: No  Language: English    PT/OT  Facility: [facility]    History: low back pain x5-6 years    Precautions/Contraindications: None    Imaging Studies: MRI    Surgical Procedure/Test Results: None    Treatment Goals:   Increase: Strength  Decrease: Pain and Spasm    Prognosis: good    Visits: Up to 12    Evaluate: Evaluate and treat                  Your next 10 appointments already scheduled     Apr 06, 2018 11:00 AM CDT   Return Lifestyle with Johana Sequeira, PhD   [facility] ([facility])    [address] [place] [address]   [phone]              Who to contact     Please call your clinic at [phone] to:    Ask questions about your health    Make or cancel appointments    Discuss your medicines    Learn about your test  "results    Speak to your doctor            Additional Information About Your Visit        MyChart Information     Cylance is an electronic gateway that provides easy, online access to your medical records. With Cylance, you can request a clinic appointment, read your test results, renew a prescription or communicate with your care team.     To sign up for [employer]ylance visit the website at [email or web address]   You will be asked to enter the access code listed below, as well as some personal information. Please follow the directions to create your username and password.     Your access code is: [email or web address]  Expires: 2018  9:29 PM     Your access code will  in 90 days. If you need help or a new code, please contact your [facility] or call [phone] for assistance.        Care EveryWhere ID     This is your Care EveryWhere ID. This could be used by other organizations to access your [facility] medical records  [id number]        Your Vitals Were     Pulse Temperature Height Pulse Oximetry BMI (Body Mass Index)       92 98.4  F (36.9  C) (Oral) 5' 6.5\" (168.9 cm) 97% 54.6 kg/m2        Blood Pressure from Last 3 Encounters:   18 123/86   18 129/87   18 (!) 126/92    Weight from Last 3 Encounters:   18 (!) 343 lb 6.4 oz (155.8 kg)   18 (!) 340 lb 3.2 oz (154.3 kg)   18 (!) 339 lb 12.8 oz (154.1 kg)              We Performed the Following     PHYSICAL THERAPY REFERRAL          Today's Medication Changes          These changes are accurate as of 18 10:54 AM.  If you have any questions, ask your nurse or doctor.               Start taking these medicines.        Dose/Directions    hydrOXYzine 50 MG tablet   Commonly known as:  ATARAX   Used for:  Sleep difficulties   Started by:  Nydia English MD        Dose:   mg   Take 1-2 tablets ( mg) by mouth nightly as needed   Quantity:  60 tablet   Refills:  1            Where to " get your medicines      These medications [facility] [place], [place] - [address] [facility]  [address], [place] [address]     Phone:  [phone]     hydrOXYzine 50 MG tablet                Primary Care Provider Office Phone # Fax #    Nydia POP English [phone]       [facility] [address] [place] [address]        Equal Access to Services     TONYA SPARROW : Hadii aad ku hadasho Soomaali, waaxda luqadaha, qaybta kaalmada adeegyada, waxay idiin hayaan adeeg miriamlambertobarry rodrigues. So [facility] [phone].    ATENCIÓN: Si lea vera, tiene a marlow disposición servicios gratuitos de asistencia lingüística. Dong al [phone].    We comply with applicable federal civil rights laws and [place] laws. We do not discriminate on the basis of race, color, national origin, age, disability, sex, sexual orientation, or gender identity.            Thank you!     Thank you for choosing [facility]  for your care. Our goal is always to provide you with excellent care. Hearing back from our patients is one way we can continue to improve our services. Please take a few minutes to complete the written survey that you may receive in the mail after your visit with us. Thank you!             Your Updated Medication List - Protect others around you: Learn how to safely use, store and throw away your medicines at www.disposemymeds.org.          This list is accurate as of 4/2/18 10:54 AM.  Always use your most recent med list.                   Brand Name Dispense Instructions for use Diagnosis    acetaminophen 325 MG tablet    TYLENOL    100 tablet    Take 1 tablet (325 mg) by mouth every 4 hours as needed for mild pain    Chronic low back pain, unspecified back pain laterality, with sciatica presence unspecified       cyclobenzaprine 10 MG tablet    FLEXERIL    45 tablet    Take 1 tablet (10 mg) by mouth 3 times daily as needed for muscle spasms    Pain in the coccyx        eucerin cream     500 g    Apply  topically as needed for dry skin.    Dry skin       hydrOXYzine 50 MG tablet    ATARAX    60 tablet    Take 1-2 tablets ( mg) by mouth nightly as needed    Sleep difficulties       ibuprofen 400 MG tablet    ADVIL/MOTRIN    120 tablet    Take 1 tablet (400 mg) by mouth every 6 hours as needed for moderate pain    Pain in the coccyx       lisinopril 40 MG tablet    PRINIVIL/ZESTRIL    45 tablet    Take 0.5 tablets (20 mg) by mouth daily    Benign essential hypertension       medroxyPROGESTERone 150 MG/ML injection    DEPO-PROVERA    0.9 mL    Inject 1 mL (150 mg) into the muscle every 3 months    Encounter for surveillance of injectable contraceptive       order for DME     1 Device    Equipment being ordered: LARGE bp cuff    Elevated blood pressure reading without diagnosis of hypertension       order for DME     1 each    Equipment being ordered: wedge cushion    Pain in the coccyx       ranitidine 150 MG tablet    ZANTAC    180 tablet    Take 1 tablet (150 mg) by mouth 2 times daily    Gastroesophageal reflux disease without esophagitis       topiramate 50 MG tablet    TOPAMAX    60 tablet    Take 1 tablet (50 mg) by mouth 2 times daily    Binge eating

## 2018-04-02 NOTE — PATIENT INSTRUCTIONS
Personal Care Plan for Chronic Pain    2.  Sleep:  Take hydroxyzine before bedtime (~100 mg = 2 tablets)    3.  Physical Activity:      * Pool therapy = 12 sessions     4.  Nutrition/Weight:    Wt Readings from Last 5 Encounters:   04/02/18 (!) 343 lb 6.4 oz (155.8 kg)   03/09/18 (!) 340 lb 3.2 oz (154.3 kg)   02/23/18 (!) 339 lb 12.8 oz (154.1 kg)   02/12/18 (!) 338 lb (153.3 kg)   01/09/18 (!) 344 lb (156 kg)    * topiramate    5.  Mood/Stress Management:    * appt Dr. Sequeira (Fri)    8.  Pain Medications: ?? medical marijuana    PHYSICAL THERAPY:  Orders demographics faxed to [facility] and they will contact patient for scheduling within 2-5 business days.  PH:   [phone]   FAX:   [phone]  Niharika Holley  4/2/18

## 2018-04-03 ASSESSMENT — PATIENT HEALTH QUESTIONNAIRE - PHQ9: SUM OF ALL RESPONSES TO PHQ QUESTIONS 1-9: 11

## 2018-04-03 ASSESSMENT — ANXIETY QUESTIONNAIRES: GAD7 TOTAL SCORE: 12

## 2018-04-04 ENCOUNTER — TRANSFERRED RECORDS (OUTPATIENT)
Dept: HEALTH INFORMATION MANAGEMENT | Facility: CLINIC | Age: 38
End: 2018-04-04

## 2018-04-05 DIAGNOSIS — M53.3 PAIN IN THE COCCYX: ICD-10-CM

## 2018-04-05 DIAGNOSIS — R63.2 BINGE EATING: ICD-10-CM

## 2018-04-05 RX ORDER — TOPIRAMATE 50 MG/1
50 TABLET, FILM COATED ORAL 2 TIMES DAILY
Qty: 60 TABLET | Refills: 1 | Status: SHIPPED | OUTPATIENT
Start: 2018-04-05 | End: 2018-06-07

## 2018-04-05 RX ORDER — IBUPROFEN 400 MG/1
400 TABLET, FILM COATED ORAL EVERY 6 HOURS PRN
Qty: 120 TABLET | Refills: 1 | Status: SHIPPED | OUTPATIENT
Start: 2018-04-05 | End: 2018-06-07

## 2018-04-05 RX ORDER — CYCLOBENZAPRINE HCL 10 MG
10 TABLET ORAL 3 TIMES DAILY PRN
Qty: 45 TABLET | Refills: 1 | Status: SHIPPED | OUTPATIENT
Start: 2018-04-05 | End: 2018-06-07

## 2018-04-06 ENCOUNTER — OFFICE VISIT (OUTPATIENT)
Dept: PSYCHOLOGY | Facility: CLINIC | Age: 38
End: 2018-04-06
Payer: COMMERCIAL

## 2018-04-06 DIAGNOSIS — G89.4 CHRONIC PAIN SYNDROME: Primary | ICD-10-CM

## 2018-04-06 NOTE — PATIENT INSTRUCTIONS
Schedule follow up with Dr. Sequeira on May 4.    Personal Care Plan for Chronic Pain    1.  Personal Goals:     1.  I'd like to be working again.   2.  I'd like to be able to go to the park and play with my kids.   3.  I want to have a satisfying intimate relationship with my partner.   4.  I want to be able to participate in daily activities like cooking, shopping, cleaning.   5.  I'd like to have decreased intensity of pain.    2.  Sleep:     *  Basic sleep plan:    *reduce or eliminate caffeine and daytime naps    * relaxation before bed    * limit screen time 1-2 hours prior to bed    * establish dark/quiet sleep environment    * go to bed at target bedtime:  10 pm    * keep consistent wake time:  8:00 am.   *  Nighttime medications including the following: flexeril, ibuprofen, hydroxyzine    3.  Physical Activity:     * Formal physical rehabilitation:    * Physical therapy - has completed 6 week course in the past.  May consider for future, but not right now    * Pool therapy - referred by Dr. English on 4/2/18, will be participating on a weekly basis for the next 8 weeks minimum.   * Home/community based activity:    * Daily stretching:  Leg lift exercises from past physical therapy 3 days per week.   * Listen to your body.  Pace yourself for success.  Don't over-do it.  Don't under do it.   * Balance activities with rest and set realistic goals.     4.  Nutrition/Weight:     * Complete daily food journal and bring these to next visit with Dr. Sequeira   * Try to eat at least 5 servings of fresh fruits and vegetables each day.   * Limit processed foods and foods high in sugar, sodium and fat.   * Maintain a healthy weight.     5.  Mood/Stress Management:    * Formal interventions:    * Child or family therapy referral options:    [facility]  [address] [place] [address]  [phone]    [address] [id number] [phone]     *  Home/community based interventions:  * Relaxation techniques  * Meditation  * Yoga  * Creative activity  * Spiritual /prayer  * Service-based activity.   * Medications: none    6.  Tobacco/Alcohol/Drug Use:      * Keep up the great tobacco free lifestyle!   * Maintain healthy relationship with alcohol    * For women this would be no more than 1 drink per day    * For men, this would be no more than 2 drinks per day   * Eliminate recreational drugs    7.  Pain:     * Non-medication treatments:    * ice/heat    * massage    * acupuncture    * chiropractor    8.  Pain Medications: As prescribed by Dr. English.  Dr. Sequeira will consult with Dr. English and Dr. Sharpe at [facility] about process for certification for medical cannabis.  We will get back to you on the details of how to set this up.

## 2018-04-06 NOTE — Clinical Note
Update on Ella.  Let's discuss what you would like to see happen with regard to medical cannabis plan.  Thanks!  Johana
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
## 2018-04-06 NOTE — MR AVS SNAPSHOT
After Visit Summary   4/6/2018    Ella Briggs    MRN: [id number]           Patient Information     Date Of Birth          1980        Visit Information        Provider Department      4/6/2018 11:00 AM Johana Sequeira, PhD [facility]        Care Instructions    Schedule follow up with Dr. Sequeira on May 4.    Personal Care Plan for Chronic Pain    1.  Personal Goals:     1.  I'd like to be working again.   2.  I'd like to be able to go to the park and play with my kids.   3.  I want to have a satisfying intimate relationship with my partner.   4.  I want to be able to participate in daily activities like cooking, shopping, cleaning.   5.  I'd like to have decreased intensity of pain.    2.  Sleep:     *  Basic sleep plan:    *reduce or eliminate caffeine and daytime naps    * relaxation before bed    * limit screen time 1-2 hours prior to bed    * establish dark/quiet sleep environment    * go to bed at target bedtime:  10 pm    * keep consistent wake time:  8:00 am.   *  Nighttime medications including the following: flexeril, ibuprofen, hydroxyzine    3.  Physical Activity:     * Formal physical rehabilitation:    * Physical therapy - has completed 6 week course in the past.  May consider for future, but not right now    * Pool therapy - referred by Dr. English on 4/2/18, will be participating on a weekly basis for the next 8 weeks minimum.   * Home/community based activity:    * Daily stretching:  Leg lift exercises from past physical therapy 3 days per week.   * Listen to your body.  Pace yourself for success.  Don't over-do it.  Don't under do it.   * Balance activities with rest and set realistic goals.     4.  Nutrition/Weight:     * Complete daily food journal and bring these to next visit with Dr. Sequeira   * Try to eat at least 5 servings of fresh fruits and vegetables each day.   * Limit processed foods and foods high in sugar, sodium and fat.   * Maintain a healthy weight.      5.  Mood/Stress Management:    * Formal interventions:    * Child or family therapy referral options:    [facility]  [address] [place] [address]  [phone]    [facility] Monte[facility]  [address] [place] [address]  [phone]     * Home/community based interventions:  * Relaxation techniques  * Meditation  * Yoga  * Creative activity  * Spiritual /prayer  * Service-based activity.   * Medications: none    6.  Tobacco/Alcohol/Drug Use:      * Keep up the great tobacco free lifestyle!   * Maintain healthy relationship with alcohol    * For women this would be no more than 1 drink per day    * For men, this would be no more than 2 drinks per day   * Eliminate recreational drugs    7.  Pain:     * Non-medication treatments:    * ice/heat    * massage    * acupuncture    * chiropractor    8.  Pain Medications: As prescribed by Dr. English.  Dr. Sequeira will consult with Dr. English and Dr. Sharpe at [facility] about process for certification for medical cannabis.  We will get back to you on the details of how to set this up.              Follow-ups after your visit        Your next 10 appointments already scheduled     Apr 25, 2018 10:00 AM CDT   Return Visit with Nydia English MD   [facility]    [address] [place] [address]   [phone]              Who to contact     Please call your clinic at [phone] to:    Ask questions about your health    Make or cancel appointments    Discuss your medicines    Learn about your test results    Speak to your doctor            Additional Information About Your Visit        Applauzehart Information     [employer] 3[employer] is an electronic gateway that provides easy, online access to your medical records. With [employer], you can request a clinic appointment, read your test results, renew a prescription or communicate with your care team.     To sign up for [employer] visit the website at  [email or web address]   You will be asked to enter the access code listed below, as well as some personal information. Please follow the directions to create your username and password.     Your access code is: [email or web address]  Expires: 2018  9:29 PM     Your access code will  in 90 days. If you need help or a new code, please contact your [facility] or call [phone] for assistance.        Care EveryWhere ID     This is your Care EveryWhere ID. This could be used by other organizations to access your [place] medical records  [id number]         Blood Pressure from Last 3 Encounters:   18 123/86   18 129/87   18 (!) 126/92    Weight from Last 3 Encounters:   18 (!) 343 lb 6.4 oz (155.8 kg)   18 (!) 340 lb 3.2 oz (154.3 kg)   18 (!) 339 lb 12.8 oz (154.1 kg)              Today, you had the following     No orders found for display       Primary Care Provider Office Phone # Fax #    Nydia POP English [phone]       [facility] [address] [place] [address]        Equal Access to Services     TONYA SPARROW AH: Hadii marky schaffer hadasho Soomaali, waaxda luqadaha, qaybta kaalmada adeegyada, junior rodrigues. So [facility] [phone].    ATENCIÓN: Si habla español, tiene a marlow disposición servicios gratuitos de asistencia lingüística. Llame al [phone].    We comply with applicable federal civil rights laws and [place] laws. We do not discriminate on the basis of race, color, national origin, age, disability, sex, sexual orientation, or gender identity.            Thank you!     Thank you for choosing [facility]  for your care. Our goal is always to provide you with excellent care. Hearing back from our patients is one way we can continue to improve our services. Please take a few minutes to complete the written survey that you may receive in the mail after your visit with us.  Thank you!             Your Updated Medication List - Protect others around you: Learn how to safely use, store and throw away your medicines at www.disposemymeds.org.          This list is accurate as of 4/6/18 11:50 AM.  Always use your most recent med list.                   Brand Name Dispense Instructions for use Diagnosis    acetaminophen 325 MG tablet    TYLENOL    100 tablet    Take 1 tablet (325 mg) by mouth every 4 hours as needed for mild pain    Chronic low back pain, unspecified back pain laterality, with sciatica presence unspecified       cyclobenzaprine 10 MG tablet    FLEXERIL    45 tablet    Take 1 tablet (10 mg) by mouth 3 times daily as needed for muscle spasms    Pain in the coccyx       eucerin cream     500 g    Apply  topically as needed for dry skin.    Dry skin       hydrOXYzine 50 MG tablet    ATARAX    60 tablet    Take 1-2 tablets ( mg) by mouth nightly as needed    Sleep difficulties       ibuprofen 400 MG tablet    ADVIL/MOTRIN    120 tablet    Take 1 tablet (400 mg) by mouth every 6 hours as needed for moderate pain    Pain in the coccyx       lisinopril 40 MG tablet    PRINIVIL/ZESTRIL    45 tablet    Take 0.5 tablets (20 mg) by mouth daily    Benign essential hypertension       medroxyPROGESTERone 150 MG/ML injection    DEPO-PROVERA    0.9 mL    Inject 1 mL (150 mg) into the muscle every 3 months    Encounter for surveillance of injectable contraceptive       order for DME     1 Device    Equipment being ordered: LARGE bp cuff    Elevated blood pressure reading without diagnosis of hypertension       order for DME     1 each    Equipment being ordered: wedge cushion    Pain in the coccyx       ranitidine 150 MG tablet    ZANTAC    180 tablet    Take 1 tablet (150 mg) by mouth 2 times daily    Gastroesophageal reflux disease without esophagitis       topiramate 50 MG tablet    TOPAMAX    60 tablet    Take 1 tablet (50 mg) by mouth 2 times daily    Binge eating

## 2018-04-16 ENCOUNTER — DOCUMENTATION ONLY (OUTPATIENT)
Dept: FAMILY MEDICINE | Facility: CLINIC | Age: 38
End: 2018-04-16

## 2018-04-16 NOTE — PROGRESS NOTES
I received the documentation from Dr. Jordan's office, Ms. Chester's previous pain provider.    Notes below, and complete records scanned.    The records did not include the first 3 visits, for which presumably most of the diagnostic work was done.  It mostly included documentation of injections and radiofrequency ablations.    Encounters:  Oct 2014: Establish care.  March 2015: Office visit.  Dec 2015: Office visit.  Jan 2016: Cervical epidural steroid injection C7-T1.  Percocet q6h.  Flexeril.  Feb 2016: Right cervical medial branch block Injection.  Urine tox screen shows THC.  No other meds/drugs, but this includes negative on prescribed oxycodone.  Percocet TID.  March 2016: Repeat right cervical medial branch block Injection.  Percocet TID.  April 2016: Awaiting approval for radiofrequency ablation.  Percocet TID.  May 2016: Radiofrequency ablation - right cervical C3-C4, C4-C5, and C5-C6.  Percocet TID.  June 2016: Radiofrequency ablation - left cervical C3-C4, C4-C5, and C5-C6.  Percocet TID.    ------    I searched CareEverywhere for imaging, going back to 2005:    Shoulder [facility]13 [facility])  Unable to view (old system)    Cervical spine x-ray 05/24/13 ([facility])  Unable to view (old system)    Lumbar spine x-ray 12/19/14 ([facility])    FINDINGS: Negative lumbar spine. Five lumbar type vertebral bodies. No fracture or subluxation.      Knee x-ray 11/24/15 ([facility])    FINDINGS: There are oval calcifications anterior to the patella that that do not appear to represent an acute process they are well-circumscribed. A fracture is not identified.    Sacrum and coccyx x-ray 02/27/18 ([facility])  FINDINGS: Mild acute angulation of the 2 distalmost mobile coccygeal segments,  which may represent acute injury. Remainder is grossly negative, within the  limitations of plain film.      -------    I searched for her in the [facility] system, from 2000 forward.  The only musculoskeletal imaging  there was a shoulder MRI 04/29/2013:

## 2018-05-04 ENCOUNTER — OFFICE VISIT (OUTPATIENT)
Dept: PSYCHOLOGY | Facility: CLINIC | Age: 38
End: 2018-05-04
Payer: COMMERCIAL

## 2018-05-04 DIAGNOSIS — G89.4 CHRONIC PAIN SYNDROME: ICD-10-CM

## 2018-05-04 DIAGNOSIS — Z65.3 PROBLEMS RELATED TO OTHER LEGAL CIRCUMSTANCES: Primary | ICD-10-CM

## 2018-05-04 NOTE — Clinical Note
See my note for update with Ella.  She'll be seeing you on 5/11/18.  Let me know if you have questions or want more follow up from me prior to that time.  Thanks!  Johana
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
## 2018-05-04 NOTE — MR AVS SNAPSHOT
"              After Visit Summary   5/4/2018    Ella Briggs    MRN: [id number]           Patient Information     Date Of Birth          1980        Visit Information        Provider Department      5/4/2018 10:00 AM Johana Sequeira, PhD [facility]        Care Instructions    Schedule follow up with Dr. Sequeira in about one month. Stop at desk on your way out to set this up.    Personal Care Plan for Chronic Pain    1.  Personal Goals:     1.  I'd like to be working again.   2.  I'd like to be able to go to the park and play with my kids.   3.  I want to have a satisfying intimate relationship with my partner.   4.  I want to be able to participate in daily activities like cooking, shopping, cleaning.   5.  I'd like to have decreased intensity of pain.    2.  Sleep:     *  Basic sleep plan:    *reduce or eliminate caffeine and daytime naps    * relaxation before bed    * limit screen time 1-2 hours prior to bed    * establish dark/quiet sleep environment    * go to bed at target bedtime:  10 pm    * keep consistent wake time:  8:00 am.   *  Nighttime medications including the following: flexeril, ibuprofen, hydroxyzine - discuss options to continue or increase dose with Dr. English at your next visit.  3.  Physical Activity:     * Formal physical rehabilitation:    * Physical therapy - has completed 6 week course in the past.  May consider for future, but not right now    * Pool therapy - keep up the good work!   * Home/community based activity:    * Daily stretching - keep up the great work!   * Listen to your body.  Pace yourself for success.  Don't over-do it.  Don't under do it.  Keep \"sore but safe\" as one way of evaluating how you're doing.   * Balance activities with rest and set realistic goals.     4.  Nutrition/Weight:     * Thank you for bringing your food journal today!  You are doing a great job!  Here's just a few things to think about:    * Keep working towards goal of 5 servings of " fresh fruits and vegetables each day.  Currently you are at about 2-3/day.  You're almost there!    * Great job with 5 glasses of water each day - we're working towards 8.  Slow and steady wins the race!    * Be thoughtful about the amount of salt and fat.     * Limit processed foods and foods high in sugar, sodium and fat.   * Maintain a healthy weight.     5.  Mood/Stress Management:    * Formal interventions:    * Child or family therapy referral options:    [facility]  [address] [place] [address]  [phone]    [address] [address]  [phone]     * Home/community based interventions:  * Relaxation techniques  * Meditation  * Yoga  * Creative activity  * Spiritual /prayer  * Service-based activity.   * Medications: none    6.  Tobacco/Alcohol/Drug Use:      * Keep up the great tobacco free lifestyle!   * Maintain healthy relationship with alcohol    * For women this would be no more than 1 drink per day    * For men, this would be no more than 2 drinks per day   * Eliminate recreational drugs    7.  Pain:     * Non-medication treatments:    * ice/heat    * massage    * acupuncture    * chiropractor    8.  Pain Medications: As prescribed by Dr. English.            Follow-ups after your visit        Your next 10 appointments already scheduled     May 11, 2018  3:30 PM CDT   Return Visit with Nydia English MD   [facility] ([facility])    [address] [place] [address]   [phone]              Who to contact     Please call your clinic at [phone] to:    Ask questions about your health    Make or cancel appointments    Discuss your medicines    Learn about your test results    Speak to your doctor            Additional Information About Your Visit        MarketShare Information     MarketShare is an electronic gateway that provides easy, online access to your medical records. With MarketShare, you can request a  clinic appointment, read your test results, renew a prescription or communicate with your care team.     To sign up for [employer] Partners[employer] visit the website at [email or web address]   You will be asked to enter the access code listed below, as well as some personal information. Please follow the directions to create your username and password.     Your access code is: [email or web address]  Expires: 2018  9:29 PM     Your access code will  in 90 days. If you need help or a new code, please contact your [facility] or call [phone] for assistance.        Care EveryWhere ID     This is your Care EveryWhere ID. This could be used by other organizations to access your [facility] medical records  [id number]         Blood Pressure from Last 3 Encounters:   18 123/86   18 129/87   18 (!) 126/92    Weight from Last 3 Encounters:   18 (!) 343 lb 6.4 oz (155.8 kg)   18 (!) 340 lb 3.2 oz (154.3 kg)   18 (!) 339 lb 12.8 oz (154.1 kg)              Today, you had the following     No orders found for display       Primary Care Provider Office Phone # Fax #    Nydia LORD MD [facility] [phone]       [address] [place] [address]        Equal Access to Services     TONYA SPARROW : Hadii marky carmichaelo Sofreddy, waaxda luqadaha, qaybta kaalmajunior salamanca . So [facility] [place] [phone].    ATENCIÓN: Si habla español, tiene a marlow disposición servicios gratuitos de asistencia lingüística. Dong al [phone].    We comply with applicable federal civil rights laws and [place] laws. We do not discriminate on the basis of race, color, national origin, age, disability, sex, sexual orientation, or gender identity.            Thank you!     Thank you for choosing [facility]  for your care. Our goal is always to provide you with excellent care. Hearing back from our patients is one way we can continue to improve our  services. Please take a few minutes to complete the written survey that you may receive in the mail after your visit with us. Thank you!             Your Updated Medication List - Protect others around you: Learn how to safely use, store and throw away your medicines at www.disposemymeds.org.          This list is accurate as of 5/4/18 11:12 AM.  Always use your most recent med list.                   Brand Name Dispense Instructions for use Diagnosis    acetaminophen 325 MG tablet    TYLENOL    100 tablet    Take 1 tablet (325 mg) by mouth every 4 hours as needed for mild pain    Chronic low back pain, unspecified back pain laterality, with sciatica presence unspecified       cyclobenzaprine 10 MG tablet    FLEXERIL    45 tablet    Take 1 tablet (10 mg) by mouth 3 times daily as needed for muscle spasms    Pain in the coccyx       eucerin cream     500 g    Apply  topically as needed for dry skin.    Dry skin       hydrOXYzine 50 MG tablet    ATARAX    60 tablet    Take 1-2 tablets ( mg) by mouth nightly as needed    Sleep difficulties       ibuprofen 400 MG tablet    ADVIL/MOTRIN    120 tablet    Take 1 tablet (400 mg) by mouth every 6 hours as needed for moderate pain    Pain in the coccyx       lisinopril 40 MG tablet    PRINIVIL/ZESTRIL    45 tablet    Take 0.5 tablets (20 mg) by mouth daily    Benign essential hypertension       medroxyPROGESTERone 150 MG/ML injection    DEPO-PROVERA    0.9 mL    Inject 1 mL (150 mg) into the muscle every 3 months    Encounter for surveillance of injectable contraceptive       order for DME     1 Device    Equipment being ordered: LARGE bp cuff    Elevated blood pressure reading without diagnosis of hypertension       order for DME     1 each    Equipment being ordered: wedge cushion    Pain in the coccyx       ranitidine 150 MG tablet    ZANTAC    180 tablet    Take 1 tablet (150 mg) by mouth 2 times daily    Gastroesophageal reflux disease without esophagitis        topiramate 50 MG tablet    TOPAMAX    60 tablet    Take 1 tablet (50 mg) by mouth 2 times daily    Binge eating
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
## 2018-05-07 DIAGNOSIS — G47.9 SLEEP DIFFICULTIES: ICD-10-CM

## 2018-05-08 RX ORDER — HYDROXYZINE HYDROCHLORIDE 50 MG/1
50-100 TABLET, FILM COATED ORAL
Qty: 60 TABLET | Refills: 1 | Status: SHIPPED | OUTPATIENT
Start: 2018-05-08 | End: 2018-07-19

## 2018-05-15 ENCOUNTER — OFFICE VISIT (OUTPATIENT)
Dept: FAMILY MEDICINE | Facility: CLINIC | Age: 38
End: 2018-05-15
Payer: COMMERCIAL

## 2018-05-15 VITALS
DIASTOLIC BLOOD PRESSURE: 81 MMHG | TEMPERATURE: 98.2 F | SYSTOLIC BLOOD PRESSURE: 121 MMHG | WEIGHT: 293 LBS | OXYGEN SATURATION: 99 % | BODY MASS INDEX: 54.15 KG/M2 | HEART RATE: 84 BPM

## 2018-05-15 DIAGNOSIS — G89.29 CHRONIC LOW BACK PAIN, UNSPECIFIED BACK PAIN LATERALITY, WITH SCIATICA PRESENCE UNSPECIFIED: ICD-10-CM

## 2018-05-15 DIAGNOSIS — M54.5 CHRONIC LOW BACK PAIN, UNSPECIFIED BACK PAIN LATERALITY, WITH SCIATICA PRESENCE UNSPECIFIED: ICD-10-CM

## 2018-05-15 DIAGNOSIS — G89.4 CHRONIC PAIN SYNDROME: Primary | ICD-10-CM

## 2018-05-15 RX ORDER — ACETAMINOPHEN 325 MG/1
325 TABLET ORAL EVERY 4 HOURS PRN
Qty: 100 TABLET | Refills: 0 | Status: SHIPPED | OUTPATIENT
Start: 2018-05-15 | End: 2018-09-28

## 2018-05-15 NOTE — LETTER
May 16, 2018      Ella Briggs  [address] [place] [address]        Dear Ms. Briggs,    Here are the results of your urine test.  It sholwed cannabinoids, which is marijuana.  Everything else was negative.     Please see below for your test results.    Resulted Orders   Rapid Urine Drug Screen ([facility])   Result Value Ref Range    Amphetamines Qual NEGATIVE NEGATIVE    Barbiturates Qual Urine NEGATIVE NEGATIVE    Buprenorphine Qual Urine NEGATIVE NEGATIVE    Benzodiazepine Qual Urine NEGATIVE NEGATIVE    Cocaine Qual Urine NEGATIVE NEGATIVE    Cannabinoids Qual Urine POSITIVE (A) NEGATIVE    Methamphetamine Qual NEGATIVE NEGATIVE    Methadone Qual NEGATIVE NEGATIVE    Morphine Qual NEGATIVE NEGATIVE    Oxycodone Qual NEGATIVE NEGATIVE    Temperature of Urine was Between  Degrees F YES YES      Comment:      This is a preliminary screening test that detects drugs-of-abuse in urine at   specified detection levels.  To confirm preliminary results, a more specific   method such as Gas Chromatography/Mass Spectrometry (GC/MS) must be used.            If you have any questions, please call the clinic to make an appointment.    Sincerely,    Nydia English MD

## 2018-05-15 NOTE — MR AVS SNAPSHOT
After Visit Summary   5/15/2018    Ella Briggs    MRN: [id number]           Patient Information     Date Of Birth          1980        Visit Information        Provider Department      5/15/2018 9:40 AM Nydia English MD [facility]        Today's Diagnoses     Chronic pain syndrome    -  1       Follow-ups after your visit        Your next 10 appointments already scheduled     2018 10:00 AM CDT   Return Lifestyle with Johana Sequeira, PhD   [facility] ([facility])    [address] [place] [address]   [phone]              Who to contact     Please call your clinic at [phone] to:    Ask questions about your health    Make or cancel appointments    Discuss your medicines    Learn about your test results    Speak to your doctor            Additional Information About Your Visit        MyChart Information     Benef[employer] Mobile[employer] is an electronic gateway that provides easy, online access to your medical records. With InvenSense, you can request a clinic appointment, read your test results, renew a prescription or communicate with your care team.     To sign up for [employer] visit the website at [email or web address]   You will be asked to enter the access code listed below, as well as some personal information. Please follow the directions to create your username and password.     Your access code is: [email or web address]  Expires: 2018  9:29 PM     Your access code will  in 90 days. If you need help or a new code, please contact your [facility] or call [phone] for assistance.        Care EveryWhere ID     This is your Care EveryWhere ID. This could be used by other organizations to access your [place] medical records  [id number]        Your Vitals Were     Pulse Temperature Pulse Oximetry BMI (Body Mass Index)          84 98.2  F (36.8  C) (Oral) 99% 54.15 kg/m2         Blood Pressure from Last 3 Encounters:   05/15/18  121/81   04/02/18 123/86   03/09/18 129/87    Weight from Last 3 Encounters:   05/15/18 (!) 340 lb 9.6 oz (154.5 kg)   04/02/18 (!) 343 lb 6.4 oz (155.8 kg)   03/09/18 (!) 340 lb 3.2 oz (154.3 kg)              We Performed the Following     Rapid Urine Drug Screen ([facility])        Primary Care Provider Office Phone # Fax #    Nydia LORD MD [facility] [phone] [address] [place] [address]        Equal Access to Services     [facility]: Hadii marky schaffer hadasho Sofreddy, waaxda luqadaha, qaybta kaalmada ada, junior watkins . So [facility] [phone].    ATENCIÓN: Si habla español, tiene a marlow disposición servicios gratuitos de asistencia lingüística. [facility] [phone].    We comply with applicable federal civil rights laws and [place] laws. We do not discriminate on the basis of race, color, national origin, age, disability, sex, sexual orientation, or gender identity.            Thank you!     Thank you for choosing [facility]  for your care. Our goal is always to provide you with excellent care. Hearing back from our patients is one way we can continue to improve our services. Please take a few minutes to complete the written survey that you may receive in the mail after your visit with us. Thank you!             Your Updated Medication List - Protect others around you: Learn how to safely use, store and throw away your medicines at www.disposemymeds.org.          This list is accurate as of 5/15/18 10:09 AM.  Always use your most recent med list.                   Brand Name Dispense Instructions for use Diagnosis    acetaminophen 325 MG tablet    TYLENOL    100 tablet    Take 1 tablet (325 mg) by mouth every 4 hours as needed for mild pain    Chronic low back pain, unspecified back pain laterality, with sciatica presence unspecified       cyclobenzaprine 10 MG tablet    FLEXERIL    45 tablet    Take 1 tablet (10 mg) by mouth 3 times daily as needed for  muscle spasms    Pain in the coccyx       eucerin cream     500 g    Apply  topically as needed for dry skin.    Dry skin       hydrOXYzine 50 MG tablet    ATARAX    60 tablet    Take 1-2 tablets ( mg) by mouth nightly as needed    Sleep difficulties       ibuprofen 400 MG tablet    ADVIL/MOTRIN    120 tablet    Take 1 tablet (400 mg) by mouth every 6 hours as needed for moderate pain    Pain in the coccyx       lisinopril 40 MG tablet    PRINIVIL/ZESTRIL    45 tablet    Take 0.5 tablets (20 mg) by mouth daily    Benign essential hypertension       medroxyPROGESTERone 150 MG/ML injection    DEPO-PROVERA    0.9 mL    Inject 1 mL (150 mg) into the muscle every 3 months    Encounter for surveillance of injectable contraceptive       order for DME     1 Device    Equipment being ordered: LARGE bp cuff    Elevated blood pressure reading without diagnosis of hypertension       order for DME     1 each    Equipment being ordered: wedge cushion    Pain in the coccyx       ranitidine 150 MG tablet    ZANTAC    180 tablet    Take 1 tablet (150 mg) by mouth 2 times daily    Gastroesophageal reflux disease without esophagitis       topiramate 50 MG tablet    TOPAMAX    60 tablet    Take 1 tablet (50 mg) by mouth 2 times daily    Binge eating

## 2018-05-16 NOTE — PROGRESS NOTES
Dear Ms. Briggs,    Here are the results of your urine test.  It sholwed cannabinoids, which is marijuana.  Everything else was negative.    Nydia English MD
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
## 2018-06-07 DIAGNOSIS — M53.3 PAIN IN THE COCCYX: ICD-10-CM

## 2018-06-07 DIAGNOSIS — R63.2 BINGE EATING: ICD-10-CM

## 2018-06-08 RX ORDER — CYCLOBENZAPRINE HCL 10 MG
10 TABLET ORAL 3 TIMES DAILY PRN
Qty: 45 TABLET | Refills: 1 | Status: SHIPPED | OUTPATIENT
Start: 2018-06-08 | End: 2018-07-19

## 2018-06-08 RX ORDER — IBUPROFEN 400 MG/1
400 TABLET, FILM COATED ORAL EVERY 6 HOURS PRN
Qty: 120 TABLET | Refills: 1 | Status: SHIPPED | OUTPATIENT
Start: 2018-06-08 | End: 2018-07-19

## 2018-06-08 RX ORDER — TOPIRAMATE 50 MG/1
50 TABLET, FILM COATED ORAL 2 TIMES DAILY
Qty: 60 TABLET | Refills: 1 | Status: SHIPPED | OUTPATIENT
Start: 2018-06-08 | End: 2018-07-19

## 2018-07-09 ENCOUNTER — ALLIED HEALTH/NURSE VISIT (OUTPATIENT)
Dept: FAMILY MEDICINE | Facility: CLINIC | Age: 38
End: 2018-07-09
Payer: COMMERCIAL

## 2018-07-09 VITALS
SYSTOLIC BLOOD PRESSURE: 138 MMHG | BODY MASS INDEX: 53.55 KG/M2 | WEIGHT: 293 LBS | HEART RATE: 84 BPM | DIASTOLIC BLOOD PRESSURE: 94 MMHG

## 2018-07-09 NOTE — MR AVS SNAPSHOT
After Visit Summary   2018    Ella Briggs    MRN: [id number]           Patient Information     Date Of Birth          1980        Visit Information        Provider Department      2018 10:00 AM Nurse, [facility]        Today's Diagnoses     Contraception    -  1       Follow-ups after your visit        Your next 10 appointments already scheduled     2018  2:50 PM CDT   Return Visit with Nydia English MD   [facility])    [address] [place] [id number]   [phone]              Who to contact     Please call your clinic at [phone] to:    Ask questions about your health    Make or cancel appointments    Discuss your medicines    Learn about your test results    Speak to your doctor            Additional Information About Your Visit        MyChart Information     Pricefallst is an electronic gateway that provides easy, online access to your medical records. With Nutrinsic, you can request a clinic appointment, read your test results, renew a prescription or communicate with your care team.     To sign up for Pricefallst visit the website at [email or web address]   You will be asked to enter the access code listed below, as well as some personal information. Please follow the directions to create your username and password.     Your access code is: [email or web address]  Expires: 10/7/2018 10:32 AM     Your access code will  in 90 days. If you need help or a new code, please contact your [facility] or call [phone] for assistance.        Care EveryWhere ID     This is your Care EveryWhere ID. This could be used by other organizations to access your [place] medical records  [id number]        Your Vitals Were     Pulse BMI (Body Mass Index)                84 53.55 kg/m2           Blood Pressure from Last 3 Encounters:   18 (!) 138/94   05/15/18 121/81   18 123/86    Weight from Last 3  Encounters:   07/09/18 (!) 336 lb 12.8 oz (152.8 kg)   05/15/18 (!) 340 lb 9.6 oz (154.5 kg)   04/02/18 (!) 343 lb 6.4 oz (155.8 kg)              We Performed the Following     INJECTION INTRAMUSCULAR OR SUB-Q     medroxyPROGESTERone (DEPO-PROVERA) injection 150 mg (Charge)        Primary Care Provider Office Phone # Fax #    Nydiakarla English [phone]       [address] [place] [address]        Equal Access to Services     [facility] : Hadii marky Leon, waaxda urbano, qaybta kaalmasadiq caballero, junior watkins . [facility] [phone].    ATENCIÓN: Si habla español, tiene a marlow disposición servicios gratuitos de asistencia lingüística. Ll[facility] [phone].    We comply with applicable federal civil rights laws and [place] laws. We do not discriminate on the basis of race, color, national origin, age, disability, sex, sexual orientation, or gender identity.            Thank you!     Thank you for choosing [facility]  for your care. Our goal is always to provide you with excellent care. Hearing back from our patients is one way we can continue to improve our services. Please take a few minutes to complete the written survey that you may receive in the mail after your visit with us. Thank you!             Your Updated Medication List - Protect others around you: Learn how to safely use, store and throw away your medicines at www.disposemymeds.org.          This list is accurate as of 7/9/18 10:32 AM.  Always use your most recent med list.                   Brand Name Dispense Instructions for use Diagnosis    acetaminophen 325 MG tablet    TYLENOL    100 tablet    Take 1 tablet (325 mg) by mouth every 4 hours as needed for mild pain    Chronic low back pain, unspecified back pain laterality, with sciatica presence unspecified       cyclobenzaprine 10 MG tablet    FLEXERIL    45 tablet    Take 1 tablet (10 mg) by mouth 3 times daily as needed for  muscle spasms    Pain in the coccyx       eucerin cream     500 g    Apply  topically as needed for dry skin.    Dry skin       hydrOXYzine 50 MG tablet    ATARAX    60 tablet    Take 1-2 tablets ( mg) by mouth nightly as needed    Sleep difficulties       ibuprofen 400 MG tablet    ADVIL/MOTRIN    120 tablet    Take 1 tablet (400 mg) by mouth every 6 hours as needed for moderate pain    Pain in the coccyx       lisinopril 40 MG tablet    PRINIVIL/ZESTRIL    45 tablet    Take 0.5 tablets (20 mg) by mouth daily    Benign essential hypertension       medroxyPROGESTERone 150 MG/ML injection    DEPO-PROVERA    0.9 mL    Inject 1 mL (150 mg) into the muscle every 3 months    Encounter for surveillance of injectable contraceptive       order for DME     1 Device    Equipment being ordered: LARGE bp cuff    Elevated blood pressure reading without diagnosis of hypertension       order for DME     1 each    Equipment being ordered: wedge cushion    Pain in the coccyx       ranitidine 150 MG tablet    ZANTAC    180 tablet    Take 1 tablet (150 mg) by mouth 2 times daily    Gastroesophageal reflux disease without esophagitis       topiramate 50 MG tablet    TOPAMAX    60 tablet    Take 1 tablet (50 mg) by mouth 2 times daily    Binge eating

## 2018-07-09 NOTE — NURSING NOTE
I administered the following to Ella Briggs.    MEDICATION: Medroxyprogesterone 150 mg  ROUTE: IM  SITE: Deltoid - Right  DOSE: 150mg per mL  LOT #: [id number]  :  [email or web address]   EXPIRATION DATE:  [id number]  NDC#: [address]-3     Was entire vial of medication used? Yes    Name of provider who requested the injection: Lorenzo  Name of provider on site (faculty or community preceptor) at the time of performing the injection: Lorenzo  Pt was given a reminder card and will have to come back between September 21, 2018 - October 20, 2018    Maura Sears MA

## 2018-07-19 ENCOUNTER — OFFICE VISIT (OUTPATIENT)
Dept: FAMILY MEDICINE | Facility: CLINIC | Age: 38
End: 2018-07-19
Payer: COMMERCIAL

## 2018-07-19 VITALS
WEIGHT: 293 LBS | OXYGEN SATURATION: 97 % | HEART RATE: 77 BPM | BODY MASS INDEX: 53.07 KG/M2 | SYSTOLIC BLOOD PRESSURE: 127 MMHG | RESPIRATION RATE: 20 BRPM | DIASTOLIC BLOOD PRESSURE: 80 MMHG | TEMPERATURE: 98.3 F

## 2018-07-19 DIAGNOSIS — G47.9 SLEEP DIFFICULTIES: ICD-10-CM

## 2018-07-19 DIAGNOSIS — G89.4 CHRONIC PAIN SYNDROME: Primary | ICD-10-CM

## 2018-07-19 DIAGNOSIS — R63.2 BINGE EATING: ICD-10-CM

## 2018-07-19 LAB
AMPHETAMINES QUAL: NEGATIVE
BARBITURATES QUAL URINE: NEGATIVE
BENZODIAZEPINE QUAL URINE: NEGATIVE
BUPRENORPHINE QUAL URINE: NEGATIVE
CANNABINOIDS UR QL SCN: POSITIVE
COCAINE QUAL URINE: NEGATIVE
METHAMPHETAMINE: NEGATIVE
METHODONE QUAL: NEGATIVE
MORPHINE QUAL: NEGATIVE
OXYCODONE QUAL: NEGATIVE
PHENCYCLIDINE: NEGATIVE
PROPOXYPHENE: NEGATIVE
TEMPERATURE OF URINE WAS BETWEEN 90-100 DEGREES F: YES
TRICYCLIC ANTIDEPRESSANTS: NEGATIVE

## 2018-07-19 RX ORDER — GABAPENTIN 300 MG/1
CAPSULE ORAL
Qty: 90 CAPSULE | Refills: 0 | Status: SHIPPED | OUTPATIENT
Start: 2018-07-19 | End: 2018-09-28

## 2018-07-19 RX ORDER — TOPIRAMATE 50 MG/1
50 TABLET, FILM COATED ORAL 2 TIMES DAILY
Qty: 60 TABLET | Refills: 1 | Status: SHIPPED | OUTPATIENT
Start: 2018-07-19 | End: 2018-09-05

## 2018-07-19 RX ORDER — HYDROXYZINE HYDROCHLORIDE 50 MG/1
50-100 TABLET, FILM COATED ORAL
Qty: 60 TABLET | Refills: 1 | Status: SHIPPED | OUTPATIENT
Start: 2018-07-19 | End: 2019-01-10

## 2018-07-19 NOTE — LETTER
July 25, 2018      Ella Briggs  [address] [facility] [address]        Dear Ella,    Your urine drug screen showed marijuana (cannabinoids) but no other illegal or non-prescribed drugs.     Please see below for your test results.    Resulted Orders   Rapid Urine Drug Screen ([facility])   Result Value Ref Range    Phencyclidine NEGATIVE NEGATIVE    Propoxyphene NEGATIVE NEGATIVE    Tricyclic Antidepressants NEGATIVE NEGATIVE    Amphetamines Qual NEGATIVE NEGATIVE    Barbiturates Qual Urine NEGATIVE NEGATIVE    Buprenorphine Qual Urine NEGATIVE NEGATIVE    Benzodiazepine Qual Urine NEGATIVE NEGATIVE    Cocaine Qual Urine NEGATIVE NEGATIVE    Cannabinoids Qual Urine POSITIVE (A) NEGATIVE    Methamphetamine Qual NEGATIVE NEGATIVE    Methadone Qual NEGATIVE NEGATIVE    Morphine Qual NEGATIVE NEGATIVE    Oxycodone Qual NEGATIVE NEGATIVE    Temperature of Urine was Between  Degrees F YES YES      Comment:      This is a preliminary screening test that detects drugs-of-abuse in urine at   specified detection levels.  To confirm preliminary results, a more specific   method such as Gas Chromatography/Mass Spectrometry (GC/MS) must be used.            If you have any questions, please call the clinic to make an appointment.    Sincerely,    Nydia English MD

## 2018-07-19 NOTE — MR AVS SNAPSHOT
After Visit Summary   2018    Ella Briggs    MRN: [id number]           Patient Information     Date Of Birth          1980        Visit Information        Provider Department      2018 2:50 PM Nydia English MD [facility]        Today's Diagnoses     Chronic pain syndrome    -  1    Sleep difficulties        Binge eating           Follow-ups after your visit        Who to contact     Please call your clinic at [phone] to:    Ask questions about your health    Make or cancel appointments    Discuss your medicines    Learn about your test results    Speak to your doctor            Additional Information About Your Visit        MyChart Information     WorthPoint is an electronic gateway that provides easy, online access to your medical records. With WorthPoint, you can request a clinic appointment, read your test results, renew a prescription or communicate with your care team.     To sign up for WorthPoint visit the website at [email or web address]   You will be asked to enter the access code listed below, as well as some personal information. Please follow the directions to create your username and password.     Your access code is: [email or web address]  Expires: 10/7/2018 10:32 AM     Your access code will  in 90 days. If you need help or a new code, please contact your [facility] or call [phone] for assistance.        Care EveryWhere ID     This is your Care EveryWhere ID. This could be used by other organizations to access your [place] medical records  [id number]        Your Vitals Were     Pulse Temperature Respirations Pulse Oximetry BMI (Body Mass Index)       77 98.3  F (36.8  C) (Oral) 20 97% 53.07 kg/m2        Blood Pressure from Last 3 Encounters:   18 127/80   18 (!) 138/94   05/15/18 121/81    Weight from Last 3 Encounters:   18 (!) 333 lb 12.8 oz (151.4 kg)   18 (!) 336 lb 12.8 oz (152.8 kg)    05/15/18 (!) 340 lb 9.6 oz (154.5 kg)              We Performed the Following     Rapid Urine Drug Screen ([facility])          Today's Medication Changes          These changes are accurate as of 7/19/18 11:59 PM.  If you have any questions, ask your nurse or doctor.               Start taking these medicines.        Dose/Directions    gabapentin 300 MG capsule   Commonly known as:  NEURONTIN   Used for:  Chronic pain syndrome   Started by:  Nydia English MD        Take 1 tablet (300 mg) every night for 1-3 days, then 1 tablet twice daily for 1-3 days, then 1 tablet three times daily   Quantity:  90 capsule   Refills:  0       naproxen 375 MG tablet   Commonly known as:  NAPROSYN   Used for:  Chronic pain syndrome   Started by:  Nydia English MD        Dose:  375 mg   Take 1 tablet (375 mg) by mouth 2 times daily (with meals)   Quantity:  60 tablet   Refills:  1         Stop taking these medicines if you haven't already. Please contact your care team if you have questions.     cyclobenzaprine 10 MG tablet   Commonly known as:  FLEXERIL   Stopped by:  Nydia English MD           ibuprofen 400 MG tablet   Commonly known as:  ADVIL/MOTRIN   Stopped by:  Nydia English MD                Where to get your medicines      These medications were sent to [facility] - [place], [place] - [address], [place] [address]     Phone:  [phone]     gabapentin 300 MG capsule    hydrOXYzine 50 MG tablet    naproxen 375 MG tablet    topiramate 50 MG tablet                Primary Care Provider Office Phone # Fax #    Nydia English [phone]       [address] [place] [address]        Equal Access to Services     [facility]: Hadmagali foreman Sofreddy, waaxda luqadaha, qaybta kaalmada ada, junior rodrigues. [facility] [phone].    ATENCIÓN: Si habla español, tiene a marlow disposición servicios gratuitos de asistencia  lingüística. Dong al [phone].    We comply with applicable federal civil rights laws and [place] laws. We do not discriminate on the basis of race, color, national origin, age, disability, sex, sexual orientation, or gender identity.            Thank you!     Thank you for choosing [facility]  for your care. Our goal is always to provide you with excellent care. Hearing back from our patients is one way we can continue to improve our services. Please take a few minutes to complete the written survey that you may receive in the mail after your visit with us. Thank you!             Your Updated Medication List - Protect others around you: Learn how to safely use, store and throw away your medicines at www.disposemymeds.org.          This list is accurate as of 7/19/18 11:59 PM.  Always use your most recent med list.                   Brand Name Dispense Instructions for use Diagnosis    acetaminophen 325 MG tablet    TYLENOL    100 tablet    Take 1 tablet (325 mg) by mouth every 4 hours as needed for mild pain    Chronic low back pain, unspecified back pain laterality, with sciatica presence unspecified       eucerin cream     500 g    Apply  topically as needed for dry skin.    Dry skin       gabapentin 300 MG capsule    NEURONTIN    90 capsule    Take 1 tablet (300 mg) every night for 1-3 days, then 1 tablet twice daily for 1-3 days, then 1 tablet three times daily    Chronic pain syndrome       hydrOXYzine 50 MG tablet    ATARAX    60 tablet    Take 1-2 tablets ( mg) by mouth nightly as needed    Sleep difficulties       lisinopril 40 MG tablet    PRINIVIL/ZESTRIL    45 tablet    Take 0.5 tablets (20 mg) by mouth daily    Benign essential hypertension       medroxyPROGESTERone 150 MG/ML injection    DEPO-PROVERA    0.9 mL    Inject 1 mL (150 mg) into the muscle every 3 months    Encounter for surveillance of injectable contraceptive       naproxen 375 MG tablet    NAPROSYN    60 tablet    Take 1  tablet (375 mg) by mouth 2 times daily (with meals)    Chronic pain syndrome       order for DME     1 Device    Equipment being ordered: LARGE bp cuff    Elevated blood pressure reading without diagnosis of hypertension       order for DME     1 each    Equipment being ordered: wedge cushion    Pain in the coccyx       ranitidine 150 MG tablet    ZANTAC    180 tablet    Take 1 tablet (150 mg) by mouth 2 times daily    Gastroesophageal reflux disease without esophagitis       topiramate 50 MG tablet    TOPAMAX    60 tablet    Take 1 tablet (50 mg) by mouth 2 times daily    Binge eating

## 2018-07-25 NOTE — PROGRESS NOTES
Dear Ella,    Your urine drug screen showed marijuana (cannabinoids) but no other illegal or non-prescribed drugs.    Nydia English MD
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
## 2018-08-07 DIAGNOSIS — I10 BENIGN ESSENTIAL HYPERTENSION: ICD-10-CM

## 2018-08-07 RX ORDER — LISINOPRIL 40 MG/1
20 TABLET ORAL DAILY
Qty: 45 TABLET | Refills: 1 | Status: SHIPPED | OUTPATIENT
Start: 2018-08-07 | End: 2019-03-19

## 2018-09-05 DIAGNOSIS — G89.4 CHRONIC PAIN SYNDROME: ICD-10-CM

## 2018-09-05 DIAGNOSIS — R63.2 BINGE EATING: ICD-10-CM

## 2018-09-05 RX ORDER — TOPIRAMATE 50 MG/1
50 TABLET, FILM COATED ORAL 2 TIMES DAILY
Qty: 60 TABLET | Refills: 1 | Status: SHIPPED | OUTPATIENT
Start: 2018-09-05 | End: 2018-09-28

## 2018-09-28 ENCOUNTER — OFFICE VISIT (OUTPATIENT)
Dept: FAMILY MEDICINE | Facility: CLINIC | Age: 38
End: 2018-09-28
Payer: COMMERCIAL

## 2018-09-28 VITALS
DIASTOLIC BLOOD PRESSURE: 84 MMHG | TEMPERATURE: 98.1 F | RESPIRATION RATE: 24 BRPM | OXYGEN SATURATION: 97 % | BODY MASS INDEX: 53.29 KG/M2 | SYSTOLIC BLOOD PRESSURE: 127 MMHG | HEART RATE: 91 BPM | WEIGHT: 293 LBS

## 2018-09-28 DIAGNOSIS — R63.2 BINGE EATING: ICD-10-CM

## 2018-09-28 DIAGNOSIS — G89.4 CHRONIC PAIN SYNDROME: Primary | ICD-10-CM

## 2018-09-28 DIAGNOSIS — Z30.42 ENCOUNTER FOR SURVEILLANCE OF INJECTABLE CONTRACEPTIVE: ICD-10-CM

## 2018-09-28 RX ORDER — CELECOXIB 100 MG/1
100 CAPSULE ORAL 2 TIMES DAILY PRN
Qty: 60 CAPSULE | Refills: 1 | Status: SHIPPED | OUTPATIENT
Start: 2018-09-28 | End: 2018-10-30

## 2018-09-28 RX ORDER — TOPIRAMATE 100 MG/1
100 TABLET, FILM COATED ORAL 2 TIMES DAILY
Qty: 60 TABLET | Refills: 3 | Status: SHIPPED | OUTPATIENT
Start: 2018-09-28 | End: 2018-12-07

## 2018-09-28 ASSESSMENT — PAIN SCALES - GENERAL: PAINLEVEL: SEVERE PAIN (6)

## 2018-09-28 NOTE — LETTER
September 28, 2018      Ella Briggs  [address] [facility] [address]        Dear Ella,    Your urine showed marijuana.     Please see below for your test results.    Resulted Orders   Rapid Urine Drug Screen ([facility])   Result Value Ref Range    Phencyclidine NEGATIVE NEGATIVE    Propoxyphene NEGATIVE NEGATIVE    Tricyclic Antidepressants NEGATIVE NEGATIVE    Amphetamines Qual NEGATIVE NEGATIVE    Barbiturates Qual Urine NEGATIVE NEGATIVE    Buprenorphine Qual Urine NEGATIVE NEGATIVE    Benzodiazepine Qual Urine NEGATIVE NEGATIVE    Cocaine Qual Urine NEGATIVE NEGATIVE    Cannabinoids Qual Urine POSITIVE (A) NEGATIVE    Methamphetamine Qual NEGATIVE NEGATIVE    Methadone Qual NEGATIVE NEGATIVE    Morphine Qual NEGATIVE NEGATIVE    Oxycodone Qual NEGATIVE NEGATIVE    Temperature of Urine was Between  Degrees F YES YES      Comment:      This is a preliminary screening test that detects drugs-of-abuse in urine at   specified detection levels.  To confirm preliminary results, a more specific   method such as Gas Chromatography/Mass Spectrometry (GC/MS) must be used.            If you have any questions, please call the clinic to make an appointment.    Sincerely,    Nydia English MD

## 2018-09-28 NOTE — MR AVS SNAPSHOT
After Visit Summary   9/28/2018    Ella Briggs    MRN: [id number]           Patient Information     Date Of Birth          1980        Visit Information        Provider Department      9/28/2018 9:40 AM Nydia English MD [facility]        Today's Diagnoses     Chronic pain syndrome    -  1    Encounter for surveillance of injectable contraceptive        Binge eating          Care Instructions    Stop the gabapentin.    Stop the naproxen.  Take celecoxib instead.    Find a pool near your house to continue the exercises.    Make an appointment with Dr. Vargas or Dr. Khan for certification.    I will keep trying to get records from [facility], but you can request them yourself as well.    Increase topiramate from 50 twice a day, to 100 twice a day.    Follow-up in 1 month.          Follow-ups after your visit        Your next 10 appointments already scheduled     Oct 30, 2018  9:40 AM CDT   Return Visit with Nydia English MD   [facility] ([facility])    [address] [place] [address]   [phone]              Who to contact     Please call your clinic at [phone] to:    Ask questions about your health    Make or cancel appointments    Discuss your medicines    Learn about your test results    Speak to your doctor            Additional Information About Your Visit        Care EveryWhere ID     This is your Care EveryWhere ID. This could be used by other organizations to access your [place] medical records  [id number]        Your Vitals Were     Pulse Temperature Respirations Pulse Oximetry BMI (Body Mass Index)       91 98.1  F (36.7  C) (Oral) 24 97% 53.29 kg/m2        Blood Pressure from Last 3 Encounters:   09/28/18 127/84   07/19/18 127/80   07/09/18 (!) 138/94    Weight from Last 3 Encounters:   09/28/18 (!) 335 lb 3.2 oz (152 kg)   07/19/18 (!) 333 lb 12.8 oz (151.4 kg)   07/09/18 (!) 336 lb 12.8 oz (152.8 kg)              We Performed the Following      INJECTION INTRAMUSCULAR OR SUB-Q     medroxyPROGESTERone (DEPO-PROVERA) injection 150 mg (Charge)     Rapid Urine Drug Screen ([facility])          Today's Medication Changes          These changes are accurate as of 9/28/18 10:26 AM.  If you have any questions, ask your nurse or doctor.               Start taking these medicines.        Dose/Directions    celecoxib 100 MG capsule   Commonly known as:  celeBREX   Used for:  Chronic pain syndrome   Started by:  Nydia English MD        Dose:  100 mg   Take 1 capsule (100 mg) by mouth 2 times daily as needed (back pain)   Quantity:  60 capsule   Refills:  1         These medicines have changed or have updated prescriptions.        Dose/Directions    topiramate 100 MG tablet   Commonly known as:  TOPAMAX   This may have changed:    - medication strength  - how much to take   Used for:  Binge eating   Changed by:  Nydia English MD        Dose:  100 mg   Take 1 tablet (100 mg) by mouth 2 times daily   Quantity:  60 tablet   Refills:  3         Stop taking these medicines if you haven't already. Please contact your care team if you have questions.     acetaminophen 325 MG tablet   Commonly known as:  TYLENOL   Stopped by:  Nydia English MD           gabapentin 300 MG capsule   Commonly known as:  NEURONTIN   Stopped by:  Nydia English MD                Where to get your medicines      These medications were sent to [facility] - [place], [place] - [address], [place] [address]     Phone:  [phone]     celecoxib 100 MG capsule    topiramate 100 MG tablet                Primary Care Provider Office Phone # Fax #    Nydia English [phone]       [address] [place] [address]        Equal Access to Services     [facility]: Esteban foreman Sofreddy, waletyda luqconsuelo, qaybta kajunior downs . [facility] [phone].    ATENCIÓN: Hector vera,  tiene a marlow disposición servicios gratuitos de asistencia lingüística. Dong bennett [phone].    We comply with applicable federal civil rights laws and [place] laws. We do not discriminate on the basis of race, color, national origin, age, disability, sex, sexual orientation, or gender identity.            Thank you!     Thank you for choosing [facility]  for your care. Our goal is always to provide you with excellent care. Hearing back from our patients is one way we can continue to improve our services. Please take a few minutes to complete the written survey that you may receive in the mail after your visit with us. Thank you!             Your Updated Medication List - Protect others around you: Learn how to safely use, store and throw away your medicines at www.disposemymeds.org.          This list is accurate as of 9/28/18 10:26 AM.  Always use your most recent med list.                   Brand Name Dispense Instructions for use Diagnosis    celecoxib 100 MG capsule    celeBREX    60 capsule    Take 1 capsule (100 mg) by mouth 2 times daily as needed (back pain)    Chronic pain syndrome       eucerin cream     500 g    Apply  topically as needed for dry skin.    Dry skin       hydrOXYzine 50 MG tablet    ATARAX    60 tablet    Take 1-2 tablets ( mg) by mouth nightly as needed    Sleep difficulties       lisinopril 40 MG tablet    PRINIVIL/ZESTRIL    45 tablet    Take 0.5 tablets (20 mg) by mouth daily    Benign essential hypertension       medroxyPROGESTERone 150 MG/ML injection    DEPO-PROVERA    0.9 mL    Inject 1 mL (150 mg) into the muscle every 3 months    Encounter for surveillance of injectable contraceptive       naproxen 375 MG tablet    NAPROSYN    60 tablet    Take 1 tablet (375 mg) by mouth 2 times daily (with meals)    Chronic pain syndrome       order for DME     1 Device    Equipment being ordered: LARGE bp cuff    Elevated blood pressure reading without diagnosis of hypertension        order for DME     1 each    Equipment being ordered: wedge cushion    Pain in the coccyx       ranitidine 150 MG tablet    ZANTAC    180 tablet    Take 1 tablet (150 mg) by mouth 2 times daily    Gastroesophageal reflux disease without esophagitis       topiramate 100 MG tablet    TOPAMAX    60 tablet    Take 1 tablet (100 mg) by mouth 2 times daily    Binge eating

## 2018-09-28 NOTE — PATIENT INSTRUCTIONS
Stop the gabapentin.    Stop the naproxen.  Take celecoxib instead.    Find a pool near your house to continue the exercises.    Make an appointment with Dr. Vargas or Dr. Khan for certification.    I will keep trying to get records from [facility], but you can request them yourself as well.    Increase topiramate from 50 twice a day, to 100 twice a day.    Follow-up in 1 month.
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
## 2018-10-30 ENCOUNTER — OFFICE VISIT (OUTPATIENT)
Dept: FAMILY MEDICINE | Facility: CLINIC | Age: 38
End: 2018-10-30
Payer: COMMERCIAL

## 2018-10-30 VITALS
RESPIRATION RATE: 24 BRPM | HEART RATE: 78 BPM | BODY MASS INDEX: 52.75 KG/M2 | SYSTOLIC BLOOD PRESSURE: 117 MMHG | OXYGEN SATURATION: 96 % | TEMPERATURE: 98.4 F | DIASTOLIC BLOOD PRESSURE: 82 MMHG | WEIGHT: 293 LBS

## 2018-10-30 DIAGNOSIS — G89.4 CHRONIC PAIN SYNDROME: Primary | ICD-10-CM

## 2018-10-30 RX ORDER — CELECOXIB 200 MG/1
200 CAPSULE ORAL 2 TIMES DAILY PRN
Qty: 60 CAPSULE | Refills: 1 | Status: SHIPPED | OUTPATIENT
Start: 2018-10-30 | End: 2020-08-31

## 2018-10-30 NOTE — LETTER
October 31, 2018      Ella Briggs  [address] [facility] [address]        Dear Ella,    Your urine drug screen showed marijuana.  Nothing else was positive.    Please see below for your test results.    Resulted Orders   Rapid Urine Drug Screen ([facility])   Result Value Ref Range    Phencyclidine NEGATIVE NEGATIVE    Propoxyphene NEGATIVE NEGATIVE    Tricyclic Antidepressants NEGATIVE NEGATIVE    Amphetamines Qual NEGATIVE NEGATIVE    Barbiturates Qual Urine NEGATIVE NEGATIVE    Buprenorphine Qual Urine NEGATIVE NEGATIVE    Benzodiazepine Qual Urine NEGATIVE NEGATIVE    Cocaine Qual Urine NEGATIVE NEGATIVE    Cannabinoids Qual Urine POSITIVE (A) NEGATIVE    Methamphetamine Qual NEGATIVE NEGATIVE    Methadone Qual NEGATIVE NEGATIVE    Morphine Qual NEGATIVE NEGATIVE    Oxycodone Qual NEGATIVE NEGATIVE    Temperature of Urine was Between  Degrees F YES YES      Comment:      This is a preliminary screening test that detects drugs-of-abuse in urine at   specified detection levels.  To confirm preliminary results, a more specific   method such as Gas Chromatography/Mass Spectrometry (GC/MS) must be used.            If you have any questions, please call the clinic to make an appointment.    Sincerely,    Nydia English MD

## 2018-10-30 NOTE — MR AVS SNAPSHOT
After Visit Summary   10/30/2018    Ella Briggs    MRN: [id number]           Patient Information     Date Of Birth          1980        Visit Information        Provider Department      10/30/2018 9:40 AM Nydia English MD [facility]        Today's Diagnoses     Chronic pain syndrome    -  1      Care Instructions    You will get a call about setting up a repeat MRI at [facility].    Fitness Center Discount Program  [email or web address]  Most gyms know about this, and can help you get set up.  $20 off per month, for going 8-12 times/month (the exact number depends on your plan).    Wt Readings from Last 5 Encounters:   10/30/18 (!) 331 lb 12.8 oz (150.5 kg)   09/28/18 (!) 335 lb 3.2 oz (152 kg)   07/19/18 (!) 333 lb 12.8 oz (151.4 kg)   07/09/18 (!) 336 lb 12.8 oz (152.8 kg)   05/15/18 (!) 340 lb 9.6 oz (154.5 kg)     Increase celebrex from 100 mg dose to 200 mg dose.                  Costochondritis (Chest Wall Pain)  Information About Your Condition:  Description  Costochondritis is inflammation of the joint between a rib and the breastbone (sternum) or between the bony part of the rib and the rib cartilage. Cartilage is a tough rubbery tissue that lines and cushions the surfaces of joints. The pain is most often on the left side of your chest, but can be on either side. Your healthcare provider might refer to costochondritis by other names, including chest wall pain, costosternal syndrome and costosternal chondrodynia. When the pain of costochondritis is accompanied by swelling it is called Tietze's syndrome. Costochondritis is more common in women than men. It tends to occur more often in people 12 to 14 years old or over 40 years old.   Symptoms  pain or tenderness to touch in the front of the chest near the breastbone   sharp pain when taking a deep breath, coughing, moving a certain way, or when pressing on it   trouble taking a deep breath    Causes  Sometimes costochondritis is caused by an injury to the chest, such as falling or getting hit by something in the chest. It can also be caused by an infection, such as a cold or the flu. Many times the cause cannot be found.   What You Should Do At Home (Follow-up Care)   Avoid activities or movement that makes the pain worse.   Sometimes heat makes the pain better. A heating pad on the lowest setting can be put on the area for 20 minutes 4 to 8 times a day.   When the pain is gone, go back to your normal activities slowly.   Do gentle stretching exercises, but do not do vigorous exercise.   Acetaminophen (Tylenol ) or over-the-counter anti-inflammatory medicine such as ibuprofen (Motrin , Advil ) or naproxen (Aleve , Naprosyn ) may help decrease your pain. You should not take ibuprofen or naproxen if you have a history of bleeding in your stomach.   If you were given a prescription, be sure to get it filled right away. Take the medicine exactly as prescribed. If you do not think it is helping, call your healthcare provider. Do not increase how much you take or how often you take it without talking to your healthcare provider first.   If the healthcare provider prescribes pain medicine that makes you tired, or sleepy, or contains narcotics, you should not drink alcohol, including beer and wine, drive, or participate in any other activities that you need to be clear-headed for.   Please keep all medicines out of the reach of children.   What You Can Do Stay Healthy  Be sure to stretch and warm up properly before you start any strenuous exercise or activity.   Care Alerts  Call Your Healthcare Provider Right Away Or Return To The Emergency Department If:  You have a fever higher than 101.5  F (38.6  C) orally.   You start to have a cough with yellowish or greenish phlegm (mucus.)   There are streaks of blood in the phlegm you are coughing up.   You have any symptoms that worry you.             Follow-ups  after your visit        Your next 10 appointments already scheduled     Nov 30, 2018  9:40 AM CST   Return Visit with Nydia English MD   [facility] ([facility])    [address] [place] [address]   [phone]              Who to contact     Please call your clinic at [phone] to:    Ask questions about your health    Make or cancel appointments    Discuss your medicines    Learn about your test results    Speak to your doctor            Additional Information About Your Visit        Care EveryWhere ID     This is your Care EveryWhere ID. This could be used by other organizations to access your [place] medical records  [id number]        Your Vitals Were     Pulse Temperature Respirations Pulse Oximetry BMI (Body Mass Index)       78 98.4  F (36.9  C) (Oral) 24 96% 52.75 kg/m2        Blood Pressure from Last 3 Encounters:   10/30/18 117/82   09/28/18 127/84   07/19/18 127/80    Weight from Last 3 Encounters:   10/30/18 (!) 331 lb 12.8 oz (150.5 kg)   09/28/18 (!) 335 lb 3.2 oz (152 kg)   07/19/18 (!) 333 lb 12.8 oz (151.4 kg)              We Performed the Following     Rapid Urine Drug Screen ([facility])          Today's Medication Changes          These changes are accurate as of 10/30/18 10:25 AM.  If you have any questions, ask your nurse or doctor.               These medicines have changed or have updated prescriptions.        Dose/Directions    celecoxib 200 MG capsule   Commonly known as:  celeBREX   This may have changed:    - medication strength  - how much to take   Used for:  Chronic pain syndrome   Changed by:  Nydia English MD        Dose:  200 mg   Take 1 capsule (200 mg) by mouth 2 times daily as needed (back pain)   Quantity:  60 capsule   Refills:  1            Where to get your medicines      These medications were sent to [facility] - [place], [place] - [address], [place] [address]     Phone:  [phone]     celecoxib 200 MG  capsule                Primary Care Provider Office Phone # Fax #    Nydiakarla English [phone]       [address] [place] [address]        Equal Access to Services     ITZELDAYNE ANDREWS : Esteban schaffer janyjj Cinthiafreddy, melissada jeetconsuelo, ishaanta kajada caballero, junior bhardwaj jakejin arceo larandimalathi rodrigues. So [facility] [place] [facility] [phone].    ATENCIÓN: Si habla español, tiene a marlow disposición servicios gratuitos de asistencia lingüística. Llame al [phone].    We comply with applicable federal civil rights laws and [place] laws. We do not discriminate on the basis of race, color, national origin, age, disability, sex, sexual orientation, or gender identity.            Thank you!     Thank you for choosing [facility]  for your care. Our goal is always to provide you with excellent care. Hearing back from our patients is one way we can continue to improve our services. Please take a few minutes to complete the written survey that you may receive in the mail after your visit with us. Thank you!             Your Updated Medication List - Protect others around you: Learn how to safely use, store and throw away your medicines at www.disposemymeds.org.          This list is accurate as of 10/30/18 10:25 AM.  Always use your most recent med list.                   Brand Name Dispense Instructions for use Diagnosis    celecoxib 200 MG capsule    celeBREX    60 capsule    Take 1 capsule (200 mg) by mouth 2 times daily as needed (back pain)    Chronic pain syndrome       eucerin cream     500 g    Apply  topically as needed for dry skin.    Dry skin       hydrOXYzine 50 MG tablet    ATARAX    60 tablet    Take 1-2 tablets ( mg) by mouth nightly as needed    Sleep difficulties       lisinopril 40 MG tablet    PRINIVIL/ZESTRIL    45 tablet    Take 0.5 tablets (20 mg) by mouth daily    Benign essential hypertension       medroxyPROGESTERone 150 MG/ML injection    DEPO-PROVERA    0.9 mL    Inject 1 mL (150  mg) into the muscle every 3 months    Encounter for surveillance of injectable contraceptive       naproxen 375 MG tablet    NAPROSYN    60 tablet    Take 1 tablet (375 mg) by mouth 2 times daily (with meals)    Chronic pain syndrome       order for DME     1 Device    Equipment being ordered: LARGE bp cuff    Elevated blood pressure reading without diagnosis of hypertension       order for DME     1 each    Equipment being ordered: wedge cushion    Pain in the coccyx       ranitidine 150 MG tablet    ZANTAC    180 tablet    Take 1 tablet (150 mg) by mouth 2 times daily    Gastroesophageal reflux disease without esophagitis       topiramate 100 MG tablet    TOPAMAX    60 tablet    Take 1 tablet (100 mg) by mouth 2 times daily    Binge eating

## 2018-10-30 NOTE — PATIENT INSTRUCTIONS
You will get a call about setting up a repeat MRI at [facility].    Fitness Center Discount Program  [email or web address]/club-finder/  Most gyms know about this, and can help you get set up.  $20 off per month, for going 8-12 times/month (the exact number depends on your plan).    Wt Readings from Last 5 Encounters:   10/30/18 (!) 331 lb 12.8 oz (150.5 kg)   09/28/18 (!) 335 lb 3.2 oz (152 kg)   07/19/18 (!) 333 lb 12.8 oz (151.4 kg)   07/09/18 (!) 336 lb 12.8 oz (152.8 kg)   05/15/18 (!) 340 lb 9.6 oz (154.5 kg)     Increase celebrex from 100 mg dose to 200 mg dose.                  Costochondritis (Chest Wall Pain)  Information About Your Condition:  Description  Costochondritis is inflammation of the joint between a rib and the breastbone (sternum) or between the bony part of the rib and the rib cartilage. Cartilage is a tough rubbery tissue that lines and cushions the surfaces of joints. The pain is most often on the left side of your chest, but can be on either side. Your healthcare provider might refer to costochondritis by other names, including chest wall pain, costosternal syndrome and costosternal chondrodynia. When the pain of costochondritis is accompanied by swelling it is called Tietze's syndrome. Costochondritis is more common in women than men. It tends to occur more often in people 12 to 14 years old or over 40 years old.   Symptoms  pain or tenderness to touch in the front of the chest near the breastbone   sharp pain when taking a deep breath, coughing, moving a certain way, or when pressing on it   trouble taking a deep breath   Causes  Sometimes costochondritis is caused by an injury to the chest, such as falling or getting hit by something in the chest. It can also be caused by an infection, such as a cold or the flu. Many times the cause cannot be found.   What You Should Do At Home (Follow-up Care)   Avoid activities or movement that makes the pain worse.   Sometimes heat makes the  pain better. A heating pad on the lowest setting can be put on the area for 20 minutes 4 to 8 times a day.   When the pain is gone, go back to your normal activities slowly.   Do gentle stretching exercises, but do not do vigorous exercise.   Acetaminophen (Tylenol ) or over-the-counter anti-inflammatory medicine such as ibuprofen (Motrin , Advil ) or naproxen (Aleve , Naprosyn ) may help decrease your pain. You should not take ibuprofen or naproxen if you have a history of bleeding in your stomach.   If you were given a prescription, be sure to get it filled right away. Take the medicine exactly as prescribed. If you do not think it is helping, call your healthcare provider. Do not increase how much you take or how often you take it without talking to your healthcare provider first.   If the healthcare provider prescribes pain medicine that makes you tired, or sleepy, or contains narcotics, you should not drink alcohol, including beer and wine, drive, or participate in any other activities that you need to be clear-headed for.   Please keep all medicines out of the reach of children.   What You Can Do Stay Healthy  Be sure to stretch and warm up properly before you start any strenuous exercise or activity.   Care Alerts  Call Your Healthcare Provider Right Away Or Return To The Emergency Department If:  You have a fever higher than 101.5  F (38.6  C) orally.   You start to have a cough with yellowish or greenish phlegm (mucus.)   There are streaks of blood in the phlegm you are coughing up.   You have any symptoms that worry you.     MRI LUMBAR SPINE W/O CONTRAST  November 6, 2018 at 3:52 pm called Ella - states she can pretty much go anytime. Advised will call back with the details of her appointment.    [facility]  [facility]: [phone]  Fax: [phone]    [facility]  [address], [address]   [place] [address]     Appointment:  Wednesday November 7,  2018  Arrival Time:  8:30 am    Please bring a copy of your insurance card and photo ID    If you cannot make this appointment please call [phone] to reschedule    November 6, 2018 at 4:07 pm left a detailed message on Ella's voicemail. Referral and demographics faxed to [phone]. [facility]

## 2018-10-30 NOTE — PROGRESS NOTES
Dear Ella,    Your urine drug screen showed marijuana.  Nothing else was positive.    Nydia English MD
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
## 2018-11-12 NOTE — PROGRESS NOTES
11/7/18 MRI LUMBAR SPINE WITHOUT CONTRAST    Conclusion:  Mile convex to the left curvature of the lumbar spine.  Multilevel degenerative disc disease and Scheuermann's-type endplate changes.  Specific findings according to level include:    L4-5 mild degenerative disc disease and inflammatory endplate change which is new compared to previous study and may represent a cause of axial low back pain.    No focal disc herniation, central spinal canal stenosis or neural impingement.

## 2018-11-30 ENCOUNTER — OFFICE VISIT (OUTPATIENT)
Dept: FAMILY MEDICINE | Facility: CLINIC | Age: 38
End: 2018-11-30
Payer: COMMERCIAL

## 2018-11-30 VITALS
TEMPERATURE: 98.1 F | DIASTOLIC BLOOD PRESSURE: 90 MMHG | BODY MASS INDEX: 52.62 KG/M2 | HEART RATE: 90 BPM | WEIGHT: 293 LBS | SYSTOLIC BLOOD PRESSURE: 123 MMHG | RESPIRATION RATE: 20 BRPM | OXYGEN SATURATION: 96 %

## 2018-11-30 DIAGNOSIS — R07.81 RIB PAIN ON RIGHT SIDE: ICD-10-CM

## 2018-11-30 DIAGNOSIS — R63.2 BINGE EATING: ICD-10-CM

## 2018-11-30 DIAGNOSIS — R20.0 NUMBNESS AND TINGLING: ICD-10-CM

## 2018-11-30 DIAGNOSIS — Z23 INFLUENZA VACCINE NEEDED: ICD-10-CM

## 2018-11-30 DIAGNOSIS — R20.2 NUMBNESS AND TINGLING: ICD-10-CM

## 2018-11-30 DIAGNOSIS — M94.0 COSTOCHONDRITIS: ICD-10-CM

## 2018-11-30 DIAGNOSIS — G89.4 CHRONIC PAIN SYNDROME: Primary | ICD-10-CM

## 2018-11-30 LAB
AMPHETAMINES QUAL: NEGATIVE
BARBITURATES QUAL URINE: NEGATIVE
BENZODIAZEPINE QUAL URINE: NEGATIVE
BUPRENORPHINE QUAL URINE: NEGATIVE
CANNABINOIDS UR QL SCN: POSITIVE
COCAINE QUAL URINE: NEGATIVE
FOLATE SERPL-MCNC: 5.8 NG/ML
METHAMPHETAMINE: NEGATIVE
METHODONE QUAL: NEGATIVE
MORPHINE QUAL: NEGATIVE
OXYCODONE QUAL: NEGATIVE
PHENCYCLIDINE: NEGATIVE
PROPOXYPHENE: NEGATIVE
TEMPERATURE OF URINE WAS BETWEEN 90-100 DEGREES F: YES
TRICYCLIC ANTIDEPRESSANTS: NEGATIVE
VIT B12 SERPL-MCNC: 427 PG/ML (ref 213–816)

## 2018-11-30 RX ORDER — PREDNISONE 20 MG/1
40 TABLET ORAL DAILY
Qty: 10 TABLET | Refills: 0 | Status: SHIPPED | OUTPATIENT
Start: 2018-11-30 | End: 2018-12-05

## 2018-11-30 NOTE — PATIENT INSTRUCTIONS
The tingling pins-and-needles frost-bite pain could be from 2 reasons:  1. Side effect of topamax.  2. Vitamin levels that are too low (B12, folate, etc).  Plan: Check the vitamins.  If they are low, then get them up to normal level with supplements.  But if they're normal, then it's probably the topamax increase on Sep 28.  If that's the case, then we would go back down one step of your dose.    Cannabis certification: I will have them call you to get this set up correctly.    MRI results: Degenerative disc disease that's new since your MRI in 2015.  (Good work on losing weight).

## 2018-11-30 NOTE — MR AVS SNAPSHOT
After Visit Summary   11/30/2018    Ella Briggs    MRN: [id number]           Patient Information     Date Of Birth          1980        Visit Information        Provider Department      11/30/2018 9:40 AM Nydia English MD [facility]        Today's Diagnoses     Chronic pain syndrome    -  1    Influenza vaccine needed        Rib pain on right side        Costochondritis        Numbness and tingling          Care Instructions    The tingling pins-and-needles frost-bite pain could be from 2 reasons:  1. Side effect of topamax.  2. Vitamin levels that are too low (B12, folate, etc).  Plan: Check the vitamins.  If they are low, then get them up to normal level with supplements.  But if they're normal, then it's probably the topamax increase on Sep 28.  If that's the case, then we would go back down one step of your dose.    Cannabis certification: I will have them call you to get this set up correctly.    MRI results: Degenerative disc disease that's new since your MRI in 2015.  (Good work on losing weight).  Fibromyalgia  Fibromyalgia is a chronic condition. It causes pain and tenderness in connective tissues. This causes muscle pain. Often, there are also many tender areas throughout the body. Symptoms may also include stiffness and feelings of numbness and tingling. Symptoms may be worse upon waking up. They may increase with poor sleep, heavy activity, cold or damp weather, anxiety, or stress.  People with fibromyalgia often feel tired. They may have trouble sleeping. Other symptoms include morning stiffness, headaches, and painful menstrual periods. Some people have problems with thinking clearly and changes in memory.  The cause of fibromyalgia is not known. Symptoms are similar to that of other diseases. These include rheumatoid arthritis, low thyroid, chronic fatigue syndrome, and Lyme disease. In some cases, these diseases may occur together.  Fibromyalgia is often treated  with medicines. You and your healthcare provider can discuss the medicine that may work best for you. You may have to try more than one medicine or combination of medicines before you find what works for you.  Home care    If your healthcare provider has prescribed or recommended medicines, take them as directed.    Rest as needed. Try to get enough sleep. If you have trouble sleeping, discuss this with your healthcare provider.     Be active. Regular exercise can help manage symptoms. Some options include walking, swimming, and biking. Strengthening exercises may also be helpful. Talk to your healthcare provider about the best ways to be active.    Follow a healthy diet. Limit caffeine and alcohol. If you smoke, ask your healthcare provider for help to stop.    Notice how your body reacts to stress. Learn to listen to your body signals. This will help you take action before the stress becomes severe.    Learn relaxation techniques. Also consider joining a stress reduction program or class.    Talk to your healthcare provider about trying complementary treatments. These include acupuncture, hypnosis, and biofeedback. Yoga and eric chi may be helpful.    Ask your healthcare provider about cognitive behavioral therapy (CBT). This type of counseling can help people with fibromyalgia cope better with their illness.  Follow-up care  Follow up with your healthcare provider or as advised by our staff. In many cases, fibromyalgia is best treated with a team approach.  This may involve your primary care provider, a rheumatologist, a physical therapist, and a mental health professional.   For more information: National [place] of Arthritis and Musculoskeletal and Skin Diseases (NIAMS)  www.niams.nih.gov 622-244-2446  When to seek medical advice  Contact your healthcare provider right away if any of these occur:    Symptoms getting worse or new symptoms developing    You feel hopeless, helpless, or lose interest in day-to-day  life  Date Last Reviewed: 3/1/2017    5820-2233 [employer]. [address] [place] [address], [place] [address]. All rights reserved. This information is not intended as a substitute for professional medical care. Always follow your healthcare professional's instructions.                Follow-ups after your visit        Your next 10 appointments already scheduled     Dec 27, 2018  1:30 PM CST   Return Visit with Nydia English MD   [facility] ([facility])    [address] [place] [id number]   [phone]              Who to contact     Please call your clinic at [phone] to:    Ask questions about your health    Make or cancel appointments    Discuss your medicines    Learn about your test results    Speak to your doctor            Additional Information About Your Visit        Care EveryWhere ID     This is your Care EveryWhere ID. This could be used by other organizations to access your [place] medical records  [id number]        Your Vitals Were     Pulse Temperature Respirations Pulse Oximetry BMI (Body Mass Index)       90 98.1  F (36.7  C) (Oral) 20 96% 52.62 kg/m2        Blood Pressure from Last 3 Encounters:   11/30/18 123/90   10/30/18 117/82   09/28/18 127/84    Weight from Last 3 Encounters:   11/30/18 (!) 331 lb (150.1 kg)   10/30/18 (!) 331 lb 12.8 oz (150.5 kg)   09/28/18 (!) 335 lb 3.2 oz (152 kg)              We Performed the Following     ADMIN VACCINE, INITIAL     FLU VAC QUADRIVLENT SPLIT VIRUS IM 0.5ml dosage     Folate  Serum ([facility])     Rapid Urine Drug Screen ([facility])     Vitamin B12 ([facility])     XR RIBS & CHEST RT G/E 3VW          Today's Medication Changes          These changes are accurate as of 11/30/18 11:17 AM.  If you have any questions, ask your nurse or doctor.               Start taking these medicines.        Dose/Directions    predniSONE 20 MG tablet   Commonly known as:  DELTASONE   Used for:  Costochondritis   Started by:  Nydia English  MD POP        Dose:  40 mg   Take 2 tablets (40 mg) by mouth daily for 5 days   Quantity:  10 tablet   Refills:  0            Where to get your medicines      [facility] [id number] [facility] [address]  [address], [facility] [address]     Phone:  [phone]     predniSONE 20 MG tablet                Primary Care Provider Office Phone # Fax #    Nydia POP English [phone]       [address] [place] [address]        Equal Access to Services     [facility]: Hadii aad ku hadasho Soomaali, waaxda luqadaha, qaybta kaalmada adeegyada, junior watkins . So [facility] [phone].    ATENCIÓN: Si habla español, tiene a marlow disposición servicios gratuitos de asistencia lingüística. [facility] [phone].    We comply with applicable federal civil rights laws and [place] laws. We do not discriminate on the basis of race, color, national origin, age, disability, sex, sexual orientation, or gender identity.            Thank you!     Thank you for choosing [facility]  for your care. Our goal is always to provide you with excellent care. Hearing back from our patients is one way we can continue to improve our services. Please take a few minutes to complete the written survey that you may receive in the mail after your visit with us. Thank you!             Your Updated Medication List - Protect others around you: Learn how to safely use, store and throw away your medicines at www.disposemymeds.org.          This list is accurate as of 11/30/18 11:17 AM.  Always use your most recent med list.                   Brand Name Dispense Instructions for use Diagnosis    celecoxib 200 MG capsule    celeBREX    60 capsule    Take 1 capsule (200 mg) by mouth 2 times daily as needed (back pain)    Chronic pain syndrome       eucerin cream     500 g    Apply  topically as needed for dry skin.     Dry skin       hydrOXYzine 50 MG tablet    ATARAX    60 tablet    Take 1-2 tablets ( mg) by mouth nightly as needed    Sleep difficulties       lisinopril 40 MG tablet    PRINIVIL/ZESTRIL    45 tablet    Take 0.5 tablets (20 mg) by mouth daily    Benign essential hypertension       medroxyPROGESTERone 150 MG/ML IM injection    DEPO-PROVERA    0.9 mL    Inject 1 mL (150 mg) into the muscle every 3 months    Encounter for surveillance of injectable contraceptive       order for DME     1 Device    Equipment being ordered: LARGE bp cuff    Elevated blood pressure reading without diagnosis of hypertension       order for DME     1 each    Equipment being ordered: wedge cushion    Pain in the coccyx       predniSONE 20 MG tablet    DELTASONE    10 tablet    Take 2 tablets (40 mg) by mouth daily for 5 days    Costochondritis       ranitidine 150 MG tablet    ZANTAC    180 tablet    Take 1 tablet (150 mg) by mouth 2 times daily    Gastroesophageal reflux disease without esophagitis       topiramate 100 MG tablet    TOPAMAX    60 tablet    Take 1 tablet (100 mg) by mouth 2 times daily    Binge eating

## 2018-11-30 NOTE — NURSING NOTE
Injectable influenza vaccine documentation    1. Has the patient received the information for the influenza vaccine? YES    2. Does the patient have a severe allergy to eggs (Patients with a severe egg allergy should be assessed by a medical provider, RN, or clinical pharmacist. If they receive the influenza vaccine, please have them observed for 15 minutes.)? No    3. Has the patient had an allergic reaction to previous influenza vaccines? No    4. Has the patient had any severe allergic reactions to past influenza vaccines ? No       5. Does patient have a history of Guillain-Mansfield syndrome? No      Based on responses above, I administered the influenza vaccine.  Tracy Hollingsworth, CMA

## 2018-11-30 NOTE — LETTER
December 7, 2018      Ella Briggs  [address] [facility] [address]        Dear Ella,    Your vitamin levels were normal.  Try cutting down on your topiramate (Topamax) from 100 mg twice a day to 50 mg twice a day to see if that helps the numbness and tingling.     Please see below for your test results.    Resulted Orders   Rapid Urine Drug Screen ([facility])   Result Value Ref Range    Phencyclidine NEGATIVE NEGATIVE    Propoxyphene NEGATIVE NEGATIVE    Tricyclic Antidepressants NEGATIVE NEGATIVE    Amphetamines Qual NEGATIVE NEGATIVE    Barbiturates Qual Urine NEGATIVE NEGATIVE    Buprenorphine Qual Urine NEGATIVE NEGATIVE    Benzodiazepine Qual Urine NEGATIVE NEGATIVE    Cocaine Qual Urine NEGATIVE NEGATIVE    Cannabinoids Qual Urine POSITIVE (A) NEGATIVE    Methamphetamine Qual NEGATIVE NEGATIVE    Methadone Qual NEGATIVE NEGATIVE    Morphine Qual NEGATIVE NEGATIVE    Oxycodone Qual NEGATIVE NEGATIVE    Temperature of Urine was Between  Degrees F YES YES      Comment:      This is a preliminary screening test that detects drugs-of-abuse in urine at   specified detection levels.  To confirm preliminary results, a more specific   method such as Gas Chromatography/Mass Spectrometry (GC/MS) must be used.        Vitamin B12 ([employer])   Result Value Ref Range    Vitamin B12 427 213 - 816 pg/mL    Narrative    Test performed by:  [facility] [address] [place] [address]   Folate  Serum ([facility])   Result Value Ref Range    Folate 5.8 >=3.5 ng/mL    Narrative    Test performed by:  [facility] [address] [place] [address]       If you have any questions, please call the clinic to make an appointment.    Sincerely,    Nydia English MD

## 2018-12-04 NOTE — PROGRESS NOTES
X-ray chest AP and lateral with 2 views of ribs 11/30/18  Findings: Negative chest.  No acute rib fracture detected.    -------------------    Discussed in clinic.
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
## 2018-12-07 RX ORDER — TOPIRAMATE 50 MG/1
50 TABLET, FILM COATED ORAL 2 TIMES DAILY
Qty: 180 TABLET | Refills: 3 | Status: SHIPPED | OUTPATIENT
Start: 2018-12-07 | End: 2019-12-31

## 2018-12-07 NOTE — PROGRESS NOTES
Results for orders placed or performed in visit on 11/30/18  -Rapid Urine Drug Screen ([facility])       Result                                            Value                         Ref Range                       Phencyclidine                                     NEGATIVE                      NEGATIVE                        Propoxyphene                                      NEGATIVE                      NEGATIVE                        Tricyclic Antidepressants                         NEGATIVE                      NEGATIVE                        Amphetamines Qual                                 NEGATIVE                      NEGATIVE                        Barbiturates Qual Urine                           NEGATIVE                      NEGATIVE                        Buprenorphine Qual Urine                          NEGATIVE                      NEGATIVE                        Benzodiazepine Qual Urine                         NEGATIVE                      NEGATIVE                        Cocaine Qual Urine                                NEGATIVE                      NEGATIVE                        Cannabinoids Qual Urine                           POSITIVE (A)                  NEGATIVE                        Methamphetamine Qual                              NEGATIVE                      NEGATIVE                        Methadone Qual                                    NEGATIVE                      NEGATIVE                        Morphine Qual                                     NEGATIVE                      NEGATIVE                        Oxycodone Qual                                    NEGATIVE                      NEGATIVE                        Temperature of Urine was Between  Degr*     YES                           YES                        -Vitamin B12 ([facility])       Result                                            Value                         Ref Range                       Vitamin B12                                        427                           213 - 816 pg/mL            -Folate  Serum ([facility])       Result                                            Value                         Ref Range                       Folate                                            5.8                           >=3.5 ng/mL

## 2018-12-18 ENCOUNTER — DOCUMENTATION ONLY (OUTPATIENT)
Dept: FAMILY MEDICINE | Facility: CLINIC | Age: 38
End: 2018-12-18

## 2019-01-10 ENCOUNTER — OFFICE VISIT (OUTPATIENT)
Dept: FAMILY MEDICINE | Facility: CLINIC | Age: 39
End: 2019-01-10
Payer: COMMERCIAL

## 2019-01-10 VITALS
OXYGEN SATURATION: 98 % | DIASTOLIC BLOOD PRESSURE: 87 MMHG | SYSTOLIC BLOOD PRESSURE: 121 MMHG | RESPIRATION RATE: 24 BRPM | WEIGHT: 293 LBS | BODY MASS INDEX: 53.01 KG/M2 | TEMPERATURE: 98 F | HEART RATE: 100 BPM

## 2019-01-10 DIAGNOSIS — M51.369 DEGENERATIVE DISC DISEASE, LUMBAR: Primary | ICD-10-CM

## 2019-01-10 DIAGNOSIS — G89.4 CHRONIC PAIN SYNDROME: ICD-10-CM

## 2019-01-10 DIAGNOSIS — G47.9 SLEEP DIFFICULTIES: ICD-10-CM

## 2019-01-10 DIAGNOSIS — Z30.42 ENCOUNTER FOR SURVEILLANCE OF INJECTABLE CONTRACEPTIVE: ICD-10-CM

## 2019-01-10 DIAGNOSIS — G44.209 TENSION HEADACHE: ICD-10-CM

## 2019-01-10 RX ORDER — HYDROXYZINE HYDROCHLORIDE 50 MG/1
50-100 TABLET, FILM COATED ORAL
Qty: 60 TABLET | Refills: 1 | Status: SHIPPED | OUTPATIENT
Start: 2019-01-10 | End: 2019-03-19

## 2019-01-10 RX ORDER — ACETAMINOPHEN 325 MG/1
325-650 TABLET ORAL EVERY 6 HOURS PRN
Qty: 100 TABLET | Refills: 3 | Status: SHIPPED | OUTPATIENT
Start: 2019-01-10 | End: 2019-06-04

## 2019-01-10 RX ORDER — METHYLPREDNISOLONE ACETATE 80 MG/ML
80 INJECTION, SUSPENSION INTRA-ARTICULAR; INTRALESIONAL; INTRAMUSCULAR; SOFT TISSUE ONCE
Qty: 1 ML | Refills: 0 | Status: CANCELLED | OUTPATIENT
Start: 2019-01-10 | End: 2019-01-10

## 2019-01-10 RX ORDER — MEDROXYPROGESTERONE ACETATE 150 MG/ML
150 INJECTION, SUSPENSION INTRAMUSCULAR
Status: DISCONTINUED | OUTPATIENT
Start: 2019-01-10 | End: 2020-01-31

## 2019-01-10 RX ADMIN — MEDROXYPROGESTERONE ACETATE 150 MG: 150 INJECTION, SUSPENSION INTRAMUSCULAR at 14:57

## 2019-01-10 ASSESSMENT — PAIN SCALES - GENERAL: PAINLEVEL: SEVERE PAIN (6)

## 2019-01-10 NOTE — PATIENT INSTRUCTIONS
Give the decreased dose of topamax a little bit more time.    Rib pain - improving.    Cannabis evaluation - the person you want to ask to talk to is Adele.    Chiropractor referral.  If there are issues with this, I can refer you to doctors here who do muscle manipulations with the neck.    Start tylenol today for headaches, and that should help the back a bit too.    Follow-up in 1 month    Depo    Future Appointments   Date Time Provider Department Center   2/5/2019  1:30 PM Nydia English MD [facility] Own[facility]     CHIROPRACTIC REFERRAL  January 11, 2019 at 3:57 pm called Ella - her mailbox is full and cannot accept new messages at this time. [facility]  January 14, 2019 at 11:20 am called Ella - she will check and see where her insurance covers Chiropractic care and follow up if any additional assistance is needed. [facility]
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
## 2019-01-10 NOTE — LETTER
January 11, 2019      Ella Briggs  [address] [facility] [address]        Dear Ella,    Your urine showed marijuana but nothing else.    Please see below for your test results.    Resulted Orders   Rapid Urine Drug Screen ([facility])   Result Value Ref Range    Phencyclidine NEGATIVE NEGATIVE    Propoxyphene NEGATIVE NEGATIVE    Tricyclic Antidepressants NEGATIVE NEGATIVE    Amphetamines Qual NEGATIVE NEGATIVE    Barbiturates Qual Urine NEGATIVE NEGATIVE    Buprenorphine Qual Urine NEGATIVE NEGATIVE    Benzodiazepine Qual Urine NEGATIVE NEGATIVE    Cocaine Qual Urine NEGATIVE NEGATIVE    Cannabinoids Qual Urine POSITIVE (A) NEGATIVE    Methamphetamine Qual NEGATIVE NEGATIVE    Methadone Qual NEGATIVE NEGATIVE    Morphine Qual NEGATIVE NEGATIVE    Oxycodone Qual NEGATIVE NEGATIVE    Temperature of Urine was Between  Degrees F YES YES      Comment:      This is a preliminary screening test that detects drugs-of-abuse in urine at   specified detection levels.  To confirm preliminary results, a more specific   method such as Gas Chromatography/Mass Spectrometry (GC/MS) must be used.            If you have any questions, please call the clinic to make an appointment.    Sincerely,    Nydia English MD

## 2019-01-10 NOTE — NURSING NOTE
I administered the following to Ella Briggs.    MEDICATION: Depo Provera 150mg  ROUTE: IM  SITE: Deltoid - Right  DOSE: 1 ml  LOT #: [id number]  :  [employer]   EXPIRATION DATE:  04/01/2020  NDC#: [address]-0   Date of next injection: 03/28/19 to 04/25/19    Was entire vial of medication used? Yes    Did the patient bring this medication to the clinic to be injected? No    Name of provider who requested the injection: Dr. English  Name of provider on site (faculty or community preceptor) at the time of performing the injection: ROSEANN Reece    Prior to injection, I verified the patient identity using patient's name and date of birth. Patient was instructed to report any adverse reaction to me immediately.    I administered the injection to Ella Briggs.    Was entire vial of medication used? Yes    Did the patient bring this medication to the clinic to be injected? No    Name of provider who requested the injection: Dr. English  Name of provider on site (faculty or community preceptor) at the time of performing the injection: Dr. English    Date of next injection: 03/28/19 to 04/25/19  Date of next office visit with provider to renew medication plan (must be seen annually): 01/10/2020    ROSEANN Johnson

## 2019-01-11 PROBLEM — M51.369 DEGENERATIVE DISC DISEASE, LUMBAR: Status: ACTIVE | Noted: 2019-01-11

## 2019-01-22 ENCOUNTER — OFFICE VISIT (OUTPATIENT)
Dept: FAMILY MEDICINE | Facility: CLINIC | Age: 39
End: 2019-01-22
Payer: COMMERCIAL

## 2019-01-22 VITALS
TEMPERATURE: 98 F | OXYGEN SATURATION: 100 % | SYSTOLIC BLOOD PRESSURE: 136 MMHG | RESPIRATION RATE: 16 BRPM | DIASTOLIC BLOOD PRESSURE: 92 MMHG | HEART RATE: 83 BPM

## 2019-01-22 DIAGNOSIS — G89.4 CHRONIC PAIN SYNDROME: Primary | ICD-10-CM

## 2019-01-22 DIAGNOSIS — Z79.899 MEDICAL CANNABIS USE: ICD-10-CM

## 2019-01-22 ASSESSMENT — PAIN SCALES - GENERAL: PAINLEVEL: SEVERE PAIN (6)
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
## 2019-01-22 NOTE — PATIENT INSTRUCTIONS
Medical Cannabis:  Informed Consent     Potential Benefits/Goals of Medical Cannabis Therapy:    The primary goal of medical cannabis for chronic pain is to increase function.  Our hope is that participation in this program will allow you to be more active in your life and to participate in important activities.    Our hope is that participation in the medical cannabis program will decrease your suffering.    Medical cannabis may or may not fully relieve all pain.  Existing studies of medical cannabis suggest symptom relief can vary from patient to patient.     Patient Expectations:  Re-certification is required by the office of medical cannabis on an annual basis.  In order to be eligible for continued certification for medical cannabis at [facility], patients must adhere to the following expectations:    1.  Patients must receive primary care at [facility] to be certified by [facility] provider.  2.  Patients must follow up at least every 3 months with PCP for chronic pain or once a month if also on opioids.  3.  If on opioids, patients can expect to have their dose of opioid decreased or to be weaned off opioids completely while receiving medical cannabis.   4.  Patients may be required to complete periodic urine drug screens.    Risks of Medical Cannabis:    Medical Cannabis is experimental:  For most conditions, more study of medical cannabis is needed to understand its proper role in comprehensive medical care.  There are unknown risks each time a patient starts taking any new medication and these unknowns are particularly prominent for medical cannabis, which is not approved for use by the Food and Drug Administration.     Medical Cannabis may have side effects:  Side effects are very common when using medical cannabis. Among the most common side effects are dizziness, fatigue, dry mouth, lightheadedness, drowsiness, and nausea. Side effects are usually mild to moderate in severity and usually resolve  quickly, but occasionally severe side effects occur.     Medical Cannabis can contribute to drug interactions:  Tell all your health care professionals about the medical cannabis you are using.  Medical cannabis can interfere with other drugs you are taking. Blood levels of other medications might need to be checked, and doses of the medications might need to be adjusted.     Medical Cannabis can be costly:  At this time, most insurance does not cover medical cannabis.  The cost of this medication will be your responsibility.    Warning:  The following groups are believed to be at increased risk of harm from use of cannabis.  Persons in these groups should generally not use medical cannabis:      Children, adolescents, and young adults    Women who are pregnant or breastfeeding    Persons with a personal or family history of psychotic disorder such as  Schizophrenia    Persons with serious heart or liver disease    Risk of cannabinoid hyperemesis syndrome  Persons who develop this syndrome have nausea, severe vomiting, and abdominal pain for days that repeats every week or every few weeks. Multiple patients enrolled in the program have ended up hospitalized for cannabinoid hyperemesis syndrome. They have been using high THC products when they developed the syndrome.    Risk of dependence and addiction  Use of medical cannabis could lead to cannabis dependence and addiction. The risk of addiction is likely to be higher with products that are high in THC and for persons who have already experienced addictions. Medical cannabis should be used with great caution, if at all, in patients with pre-existing addiction disorders. Among heavy, regular users of recreational marijuana, stopping use abruptly can lead to distressing withdrawal symptoms for several days. The risk of withdrawal symptoms for medical cannabis products is likely to vary with product composition and dose. It is probably more likely with large doses of  products with high THC content.      For a brief review of the scientific literature on these topics, on the [place] Medical Cannabis website see Brief Review of Studies Regarding Increased Risk of Harm with Cannabis Use (PDF) http://www.health.[facility].[place]/topics/cannabis/practitioners/humanstudies.pdf    Recommendations for Medical Cannabis Users:    Do not drive, operate machinery, or do work that could harm people when under the influence of medical cannabis:  Medical cannabis can impair perception, reaction time, motor skills, and attention in ways that make it dangerous to drive, operate machinery, or engage in any activity at home or at work that could harm others or cause professional malpractice. Just how long such impairments will last for a particular individual taking a particular type and dose of medical cannabis is unknown, but it is at least several hours. Impairment is likely to be greater when taking medical cannabis products with high THC (tetrahydrocannabinol) content. Drinking alcohol while taking medical cannabis makes impairment much worse.    Start low, go slow  To avoid having unpleasant side effects, it is best to start at a low dose and then increase the dose slowly over time until the symptoms are relieved or side effects develop. Pharmacists at the [facility] Centers will follow this methodology. Talk to the pharmacist at a [facility] Patient Center about recommendations for how to increase dosage and what to do if side effects appear. You might want to purchase less than a 30-day supply of medical cannabis during your first few visits to a [facility]abis Patient Center until you learn how you respond to a particular medical cannabis product.    Keep medications secure and in their original containers  When medications are not in their original containers, it is easier to mix up the identity of a drug. The label on the original container identifies the lawful owner of the product. As with  any medication, medical cannabis should be kept in a secure place where others such as children cannot gain access to it.     Don t use medical cannabis where it is illegal  Under [place] law, it remains illegal for a patient enrolled in the medical cannabis program to possess or use medical cannabis on a school bus or van, on the grounds of a  or a primary or secondary school, in any correctional facility, or on the grounds of any childcare facility or home day care. Vaporizing medical cannabis is illegal on any form of public transportation, any location where with vapor would be inhales by a non-patient minor child, or in any public place, including indoor or outdoor areas used by or open to the general public or at a place of employment.    Do not give or sell to others medical cannabis that you  purchase  Sharing medical cannabis you obtain under the program with others is a crime, and you could face criminal charges and be excluded from the [place] Medical Cannabis program.     Most of these warnings and recommendations are adapted from the [place] Department of Health, Office of Medical Cannabis Website (October 30, 2018).

## 2019-03-18 DIAGNOSIS — I10 BENIGN ESSENTIAL HYPERTENSION: ICD-10-CM

## 2019-03-18 DIAGNOSIS — G47.9 SLEEP DIFFICULTIES: ICD-10-CM

## 2019-03-19 RX ORDER — LISINOPRIL 40 MG/1
20 TABLET ORAL DAILY
Qty: 45 TABLET | Refills: 1 | Status: SHIPPED | OUTPATIENT
Start: 2019-03-19 | End: 2019-10-28

## 2019-03-19 RX ORDER — HYDROXYZINE HYDROCHLORIDE 50 MG/1
50-100 TABLET, FILM COATED ORAL
Qty: 60 TABLET | Refills: 1 | Status: SHIPPED | OUTPATIENT
Start: 2019-03-19 | End: 2023-01-24

## 2019-04-19 ENCOUNTER — ALLIED HEALTH/NURSE VISIT (OUTPATIENT)
Dept: FAMILY MEDICINE | Facility: CLINIC | Age: 39
End: 2019-04-19
Payer: COMMERCIAL

## 2019-04-19 VITALS
RESPIRATION RATE: 18 BRPM | HEART RATE: 78 BPM | TEMPERATURE: 98 F | DIASTOLIC BLOOD PRESSURE: 84 MMHG | SYSTOLIC BLOOD PRESSURE: 132 MMHG

## 2019-04-19 DIAGNOSIS — Z30.42 ENCOUNTER FOR SURVEILLANCE OF INJECTABLE CONTRACEPTIVE: Primary | ICD-10-CM

## 2019-04-19 RX ADMIN — MEDROXYPROGESTERONE ACETATE 150 MG: 150 INJECTION, SUSPENSION INTRAMUSCULAR at 14:17

## 2019-04-19 NOTE — NURSING NOTE
I administered the following to Ella Briggs.    MEDICATION: Depo Provera 150mg  ROUTE: IM  SITE: Deltoid - Left  DOSE: 150mg  LOT #: [id number]  :  [employer]   EXPIRATION DATE:  05/2020  NDC#: [address]-1     Was entire vial of medication used? Yes    Did the patient bring this medication to the clinic to be injected? No    Name of provider who requested the injection: Dr. Nydia English  Name of provider on site (faculty or community preceptor) at the time of performing the injection: Dr. April Livingston, [facility]

## 2019-05-10 ENCOUNTER — TELEPHONE (OUTPATIENT)
Dept: FAMILY MEDICINE | Facility: CLINIC | Age: 39
End: 2019-05-10

## 2019-05-10 NOTE — TELEPHONE ENCOUNTER
P Family Medicine phone call message- general phone call:    Reason for call: Pt would like you to know that she had to cancel due to her grandpa going into the hospital.  It's not looking good.    Return call needed: Yes    OK to leave a message on voice mail? Yes    Primary language: English      needed? No    Call taken on May 10, 2019 at 1:03 PM by Chantel Osorio

## 2019-06-03 DIAGNOSIS — G44.209 TENSION HEADACHE: ICD-10-CM

## 2019-06-04 RX ORDER — ACETAMINOPHEN 325 MG/1
325-650 TABLET ORAL EVERY 6 HOURS PRN
Qty: 100 TABLET | Refills: 3 | Status: SHIPPED | OUTPATIENT
Start: 2019-06-04 | End: 2023-01-24 | Stop reason: DRUGHIGH

## 2019-07-12 ENCOUNTER — OFFICE VISIT (OUTPATIENT)
Dept: FAMILY MEDICINE | Facility: CLINIC | Age: 39
End: 2019-07-12
Payer: COMMERCIAL

## 2019-07-12 VITALS
BODY MASS INDEX: 53.77 KG/M2 | WEIGHT: 293 LBS | SYSTOLIC BLOOD PRESSURE: 137 MMHG | DIASTOLIC BLOOD PRESSURE: 92 MMHG | RESPIRATION RATE: 16 BRPM | TEMPERATURE: 98.4 F | HEART RATE: 93 BPM | OXYGEN SATURATION: 95 %

## 2019-07-12 DIAGNOSIS — Z30.42 ENCOUNTER FOR SURVEILLANCE OF INJECTABLE CONTRACEPTIVE: Primary | ICD-10-CM

## 2019-07-12 DIAGNOSIS — Z30.9 CONTRACEPTIVE MANAGEMENT: ICD-10-CM

## 2019-07-12 RX ORDER — MEDROXYPROGESTERONE ACETATE 150 MG/ML
150 INJECTION, SUSPENSION INTRAMUSCULAR
Status: DISCONTINUED | OUTPATIENT
Start: 2019-07-12 | End: 2021-08-03

## 2019-07-12 RX ADMIN — MEDROXYPROGESTERONE ACETATE 150 MG: 150 INJECTION, SUSPENSION INTRAMUSCULAR at 11:05

## 2019-07-12 NOTE — NURSING NOTE
Clinic Administered Medication Documentation      Depo Provera Documentation    Prior to injection, verified patient identity using patient's name and date of birth. Medication was administered. Please see MAR and medication order for additional information. Patient instructed to remain in clinic for 15 minutes.    BP: 137/92    LAST PAP/EXAM: No results found for: PAP  URINE HCG:negative    NEXT INJECTION DUE: 9/27/19 - 10/25/19    Was entire vial of medication used? Yes  Vial/Syringe: Single dose vial  Expiration Date:  10/30/2020  Provider on Site: Lex Gandara

## 2019-10-21 ENCOUNTER — ALLIED HEALTH/NURSE VISIT (OUTPATIENT)
Dept: FAMILY MEDICINE | Facility: CLINIC | Age: 39
End: 2019-10-21
Payer: COMMERCIAL

## 2019-10-21 VITALS
DIASTOLIC BLOOD PRESSURE: 88 MMHG | RESPIRATION RATE: 28 BRPM | HEART RATE: 89 BPM | SYSTOLIC BLOOD PRESSURE: 131 MMHG | TEMPERATURE: 99.1 F | WEIGHT: 293 LBS | HEIGHT: 66 IN | BODY MASS INDEX: 47.09 KG/M2 | OXYGEN SATURATION: 95 %

## 2019-10-21 DIAGNOSIS — Z78.9 USES BIRTH CONTROL: Primary | ICD-10-CM

## 2019-10-21 RX ADMIN — MEDROXYPROGESTERONE ACETATE 150 MG: 150 INJECTION, SUSPENSION INTRAMUSCULAR at 15:16

## 2019-10-21 ASSESSMENT — PAIN SCALES - GENERAL: PAINLEVEL: NO PAIN (0)

## 2019-10-21 ASSESSMENT — MIFFLIN-ST. JEOR: SCORE: 2228.86

## 2019-10-21 NOTE — PROGRESS NOTES
Clinic Administered Medication Documentation    MEDICATION LIST:   Depo Provera Documentation    Prior to injection, verified patient identity using patient's name and date of birth. Medication was administered. Please see MAR and medication order for additional information. Patient instructed to report any adverse reaction to staff immediately .    BP: 131/88    LAST PAP/EXAM: No results found for: PAP  URINE HCG:not indicated    NEXT INJECTION DUE: 1/6/20 - 2/3/20    Was entire vial of medication used? Yes  Vial/Syringe: Single dose vial  Expiration Date:  12/2020      Name of provider who requested the medication administration: Dr. Prasanna Frey   Name of provider on site (faculty or community preceptor) at the time of performing the medication administration: Dr. Prasanna Frey    Date of next administration: Between 01/06/2020-02/03/2020  Date of next office visit with provider to renew medication plan (must be seen annually): 01/12//2020    Bradley Moar CMA

## 2019-10-21 NOTE — PATIENT INSTRUCTIONS
Date of next administration: Between 01/06/2020-02/03/2020  Date of next office visit with provider to renew medication plan (must be seen annually): 01/12//2020

## 2019-10-21 NOTE — NURSING NOTE
Chief Complaint   Patient presents with     Allied Health Visit     Depo shot      Bradley Mora CMA    Depo shot was given today even when Epic states that patient was due for it since 10/10/2019 because following the Clinic Depo Calendar (On vaccines Fridge Door) says between 09/27-10/25/2019.    If patient comes for her next Depo shot after 01/12/2020, a prescription renewal is needed, so patient needs to see a provider.     Bradley Mora CMA

## 2019-10-28 DIAGNOSIS — I10 BENIGN ESSENTIAL HYPERTENSION: ICD-10-CM

## 2019-10-28 RX ORDER — LISINOPRIL 40 MG/1
20 TABLET ORAL DAILY
Qty: 45 TABLET | Refills: 1 | Status: SHIPPED | OUTPATIENT
Start: 2019-10-28 | End: 2020-05-04

## 2019-12-30 DIAGNOSIS — R63.2 BINGE EATING: ICD-10-CM

## 2019-12-31 RX ORDER — TOPIRAMATE 50 MG/1
50 TABLET, FILM COATED ORAL 2 TIMES DAILY
Qty: 180 TABLET | Refills: 3 | Status: SHIPPED | OUTPATIENT
Start: 2019-12-31 | End: 2021-01-14

## 2020-01-30 ENCOUNTER — TELEPHONE (OUTPATIENT)
Dept: FAMILY MEDICINE | Facility: CLINIC | Age: 40
End: 2020-01-30

## 2020-01-31 ENCOUNTER — OFFICE VISIT (OUTPATIENT)
Dept: FAMILY MEDICINE | Facility: CLINIC | Age: 40
End: 2020-01-31
Payer: COMMERCIAL

## 2020-01-31 VITALS
DIASTOLIC BLOOD PRESSURE: 93 MMHG | HEIGHT: 66 IN | HEART RATE: 89 BPM | RESPIRATION RATE: 16 BRPM | WEIGHT: 293 LBS | SYSTOLIC BLOOD PRESSURE: 136 MMHG | BODY MASS INDEX: 47.09 KG/M2 | TEMPERATURE: 98.5 F | OXYGEN SATURATION: 95 %

## 2020-01-31 DIAGNOSIS — Z30.42 ENCOUNTER FOR SURVEILLANCE OF INJECTABLE CONTRACEPTIVE: ICD-10-CM

## 2020-01-31 DIAGNOSIS — Z32.00 PREGNANCY EXAMINATION OR TEST, PREGNANCY UNCONFIRMED: Primary | ICD-10-CM

## 2020-01-31 DIAGNOSIS — Z78.9 USES BIRTH CONTROL: ICD-10-CM

## 2020-01-31 LAB — HCG UR QL: NEGATIVE

## 2020-01-31 RX ORDER — MEDROXYPROGESTERONE ACETATE 150 MG/ML
150 INJECTION, SUSPENSION INTRAMUSCULAR
Status: ACTIVE | OUTPATIENT
Start: 2020-01-31 | End: 2021-01-25

## 2020-01-31 RX ADMIN — MEDROXYPROGESTERONE ACETATE 150 MG: 150 INJECTION, SUSPENSION INTRAMUSCULAR at 17:09

## 2020-01-31 ASSESSMENT — MIFFLIN-ST. JEOR: SCORE: 2228.63

## 2020-01-31 NOTE — NURSING NOTE
Clinic Administered Medication Documentation    MEDICATION LIST:   Depo Provera Documentation    Prior to injection, verified patient identity using patient's name and date of birth. Medication was administered. Please see MAR and medication order for additional information. Patient instructed to remain in clinic for 15 minutes.    BP: 136/93    LAST PAP/EXAM: No results found for: PAP  URINE HCG:negative    NEXT INJECTION DUE: 4/17/20 - 5/1/20    Was entire vial of medication used? Yes  Vial/Syringe: Single dose vial  Expiration Date:  01/2021      I administered the following to Ella Briggs.    MEDICATION: Depo Provera 150mg  ROUTE: IM  SITE: Deltoid - Right  DOSE: 150 mg/mL  LOT #: [id number]  :  [employer]  EXPIRATION DATE:  012021  NDC#: 1012-1836-41     Was entire vial of medication used? Yes    Did the patient bring this medication to the clinic to be injected? No    Name of provider who requested the injection: Dr. English  Name of provider on site (faculty or community preceptor) at the time of performing the injection: Dr. Amador Cam, [facility]

## 2020-01-31 NOTE — PROGRESS NOTES
"Assessment & Plan   Contraception management, with plan to continue Depo.  We briefly discussed other options, but the injectable is working well.  No contraindications.  UPT negative today.  She is within the window of repeat injection.  -- Medroxyprogesterone (Depo) 150 mg IM [id number] x1 year.    Subjective   Ella Briggs is a 39 year old female with a history including obesity and HTN who presents for consideration of another year of Depo use for contraception.    She has been on Depo for contraception, and it works well for her.  She denies history of liver disease, cancer, or blood clots.  She has a history of HTN, but blood pressures are 120s-130s/80s on lisinopril 40.  She is a former smoker.    Social: She reports that she has quit smoking. She has never used smokeless tobacco.    Objective   Vitals: BP (!) 136/93 (BP Location: Left arm)   Pulse 89   Temp 98.5  F (36.9  C) (Oral)   Resp 16   Ht 1.67 m (5' 5.75\")   Wt (!) 153.6 kg (338 lb 9.6 oz)   SpO2 95%   BMI 55.07 kg/m    General: Pleasant. Young woman. Obese. No distress.  Heart: Regular rate and rhythm. No murmurs, rubs, or gallops.  Lungs: Clear to auscultation bilaterally. No wheezes or crackles. Good air movement.  Psych: Appropriate grooming and hygiene. Speech normal rate. Appropriate mood and affect.    Labs:  Results for orders placed or performed in visit on 01/31/20   HCG Qualitative Urine (UPT)  ([facility])     Status: None   Result Value Ref Range    HCG Qual Urine Negative Negative       "

## 2020-04-13 ENCOUNTER — TELEPHONE (OUTPATIENT)
Dept: FAMILY MEDICINE | Facility: CLINIC | Age: 40
End: 2020-04-13

## 2020-04-13 NOTE — TELEPHONE ENCOUNTER
Reached out to patient during COVID19 Clinic outreach. Reassured patient that [facility] is still open and has started implementing phone and video appointments to help patient remain safe at home.     Patient reports the following concerns: n/a    Per patient request, patient is scheduled for a visit to address their concerns on the following date: n/a     Offered MyChart. Patient accepted.    Pt will call to schedule if needed.    ROSEANN Johnson

## 2020-04-20 ENCOUNTER — ALLIED HEALTH/NURSE VISIT (OUTPATIENT)
Dept: FAMILY MEDICINE | Facility: CLINIC | Age: 40
End: 2020-04-20
Payer: COMMERCIAL

## 2020-04-20 VITALS
HEART RATE: 91 BPM | DIASTOLIC BLOOD PRESSURE: 90 MMHG | SYSTOLIC BLOOD PRESSURE: 125 MMHG | WEIGHT: 293 LBS | BODY MASS INDEX: 56.08 KG/M2 | TEMPERATURE: 98.7 F | OXYGEN SATURATION: 95 %

## 2020-04-20 DIAGNOSIS — Z78.9 USES BIRTH CONTROL: Primary | ICD-10-CM

## 2020-04-20 RX ADMIN — MEDROXYPROGESTERONE ACETATE 150 MG: 150 INJECTION, SUSPENSION INTRAMUSCULAR at 14:28

## 2020-04-20 NOTE — NURSING NOTE
Clinic Administered Medication Documentation      Depo Provera Documentation    URINE HCG: not indicated    Depo-Provera Standing Order inclusion/exclusion criteria reviewed.   Patient meets: inclusion criteria     BP: 125/90  LAST PAP/EXAM: No results found for: PAP    Prior to injection, verified patient identity using patient's name and date of birth. Medication was administered. Please see MAR and medication order for additional information.     Was entire vial of medication used? Yes  Vial/Syringe: Single dose vial  Expiration Date:  10/2021    Patient instructed to remain in clinic for 15 minutes.  NEXT INJECTION DUE: 7/7/20 - 7/21/20      Name of provider who requested the medication administration: Dr. English  Name of provider on site (faculty or community preceptor) at the time of performing the medication administration: Dr. Frey    Date of next administration: 7/7/2020 - 7/21/2020  Date of next office visit with provider to renew medication plan (must be seen annually): 1/31/2021

## 2020-05-02 DIAGNOSIS — I10 BENIGN ESSENTIAL HYPERTENSION: ICD-10-CM

## 2020-05-04 RX ORDER — LISINOPRIL 40 MG/1
TABLET ORAL
Qty: 45 TABLET | Refills: 1 | Status: SHIPPED | OUTPATIENT
Start: 2020-05-04 | End: 2020-12-15

## 2020-05-15 ENCOUNTER — VIRTUAL VISIT (OUTPATIENT)
Dept: FAMILY MEDICINE | Facility: CLINIC | Age: 40
End: 2020-05-15
Payer: COMMERCIAL

## 2020-05-15 VITALS — HEART RATE: 106 BPM | DIASTOLIC BLOOD PRESSURE: 96 MMHG | SYSTOLIC BLOOD PRESSURE: 144 MMHG

## 2020-05-15 DIAGNOSIS — M94.0 COSTOCHONDRITIS: Primary | ICD-10-CM

## 2020-05-15 RX ORDER — PREDNISONE 20 MG/1
40 TABLET ORAL DAILY
Qty: 10 TABLET | Refills: 0 | Status: SHIPPED | OUTPATIENT
Start: 2020-05-15 | End: 2020-05-20

## 2020-05-15 NOTE — PROGRESS NOTES
Preceptor Attestation:   Patient seen and evaluated via video visit. I discussed the patient with the resident. I have verified the content of the note, which accurately reflects my assessment of the patient and the plan of care.   Supervising Physician:  Norris Ramon MD.

## 2020-05-15 NOTE — PROGRESS NOTES
"Family Medicine Video Visit Note  Ella is being evaluated via a billable video visit.           Video Visit Consent     Patient was verbally read the following and verbal consent was obtained.  \"Video visits are billed at different rates depending on your insurance coverage. During this emergency period, for some insurers they may be billed the same as an in-person visit.  Please reach out to your insurance provider with any questions.  If during the course of the call the physician/provider feels a telephone visit is not appropriate, you will not be charged for this service.\"     (Name person giving consent:  Patient   Date verbal consent given:  5/15/2020  Time verbal consent given:  1:07 PM)    Patient would like the video invitation sent by: Text to cell phone: [phone]      Chief Complaint   Patient presents with     Chest Pain     the pain has moved on the left side and now it has moved over to the right, the pain is a sharp, she doesnt notice when she is breathing but when she moves a certain way, also when she coughs more, plus when she lays down           HPI     Video Start Time: 1:15 pm    Ella presents to clinic today for the following health issues:    Ella Briggs is a pleasant 39-year-old female who presents with concerns of rib pain.  Patient reports that she had similar pain 2 years ago.  Patient denies any trauma to the area.  Increased pain with deep inspiration or movement of her torso.  She denies any overlying rash.  No fevers, chills, nausea or vomiting.  No shortness of breath or respiratory symptoms. Patient states that she just woke up with her symptoms he denies any known inciting event.  Patient reports that 2 years ago she was diagnosed with costochondritis and symptoms resolved with treatment at that time.  Patient has no other concerns at this time.     Current Outpatient Medications   Medication Sig Dispense Refill     acetaminophen (TYLENOL) 325 MG tablet Take 1-2 " "tablets (325-650 mg) by mouth every 6 hours as needed for headaches 100 tablet 3     celecoxib (CELEBREX) 200 MG capsule Take 1 capsule (200 mg) by mouth 2 times daily as needed (back pain) (Patient not taking: Reported on 1/31/2020) 60 capsule 1     hydrOXYzine (ATARAX) 50 MG tablet Take 1-2 tablets ( mg) by mouth nightly as needed (sleep) 60 tablet 1     lisinopril (ZESTRIL) 40 MG tablet TAKE 1/2 TABLET(20 MG) BY MOUTH DAILY 45 tablet 1     medical cannabis (Patient's own supply.  Not a prescription) (This is NOT a prescription, and does not certify that the patient has a qualifying medical condition for medical cannabis.  The purpose of this order is  to document that the patient reports taking medical cannabis.) 0 Information only 0     medroxyPROGESTERone (DEPO-PROVERA) 150 MG/ML injection Inject 1 mL (150 mg) into the muscle every 3 months 0.9 mL 3     order for DME Equipment being ordered: wedge cushion 1 each 0     ORDER FOR DME Equipment being ordered: LARGE bp cuff 1 Device 0     ranitidine (ZANTAC) 150 MG tablet Take 1 tablet (150 mg) by mouth 2 times daily 180 tablet 3     Skin Protectants, Misc. (EUCERIN) cream Apply  topically as needed for dry skin. 500 g 1     topiramate (TOPAMAX) 50 MG tablet Take 1 tablet (50 mg) by mouth 2 times daily 180 tablet 3     Allergies   Allergen Reactions     Nkda [No Known Drug Allergies]               Review of Systems:     Constitutional, HEENT, cardiovascular, pulmonary, gi and gu systems are negative, except as otherwise noted.         Physical Exam:     There were no vitals taken for this visit.  Estimated body mass index is 56.08 kg/m  as calculated from the following:    Height as of 1/31/20: 1.67 m (5' 5.75\").    Weight as of 4/20/20: 156.4 kg (344 lb 12.8 oz).    GENERAL: Healthy, alert and no distress  EYES: Eyes grossly normal to inspection.  No discharge or erythema, or obvious scleral/conjunctival abnormalities.  RESP: No audible wheeze, cough, or " visible cyanosis.  No visible retractions or increased work of breathing.    SKIN: Visible skin clear. No significant rash, abnormal pigmentation or lesions.  NEURO: Cranial nerves grossly intact.  Mentation and speech appropriate for age.  PSYCH: Mentation appears normal, affect normal/bright, judgement and insight intact, normal speech and appearance well-groomed.        Assessment and Plan     1. Costochondritis: History of costochondritis in the past. Patients symptoms are consistent with this today as well. Sent a prednisone burst. Good relief with this treatment 2 years ago. No concerning findings at this time. Continue home medications. Return in one week if symptoms are not improving.      - predniSONE (DELTASONE) 20 MG tablet; Take 2 tablets (40 mg) by mouth daily for 5 days  Dispense: 10 tablet; Refill: 0      Refilled medications that would be required in the next 3 months.   Resume for  After Visit Information:  Patient declined AVS       No follow-ups on file.      Video-Visit Details    Type of service:  Video Visit    Video End Time (time video stopped): 1:30 pm    Originating Location (pt. Location): Home    Distant Location (provider location):  [facility]     Mode of Communication:  Video Conference via Margarette Bellamy DO  I precepted today with Dr. Ramon.  At the time of their

## 2020-08-17 ENCOUNTER — OFFICE VISIT (OUTPATIENT)
Dept: FAMILY MEDICINE | Facility: CLINIC | Age: 40
End: 2020-08-17
Payer: COMMERCIAL

## 2020-08-17 VITALS
DIASTOLIC BLOOD PRESSURE: 89 MMHG | TEMPERATURE: 98.3 F | BODY MASS INDEX: 55.3 KG/M2 | SYSTOLIC BLOOD PRESSURE: 148 MMHG | RESPIRATION RATE: 18 BRPM | HEART RATE: 88 BPM | OXYGEN SATURATION: 95 % | WEIGHT: 293 LBS

## 2020-08-17 DIAGNOSIS — Z78.9 USES BIRTH CONTROL: Primary | ICD-10-CM

## 2020-08-17 DIAGNOSIS — I10 ESSENTIAL HYPERTENSION: ICD-10-CM

## 2020-08-17 LAB — HCG UR QL: NEGATIVE

## 2020-08-17 NOTE — PROGRESS NOTES
Preceptor Attestation:   Patient seen, evaluated and discussed with the resident. I have verified the content of the note, which accurately reflects my assessment of the patient and the plan of care.   Supervising Physician:  Prasanna Frey MD

## 2020-08-17 NOTE — PROGRESS NOTES
SUBJECTIVE       Ella Briggs is a 40 year old  female with a PMH significant for:     Patient Active Problem List   Diagnosis     Need for prophylactic vaccination with measles-mumps-rubella (MMR) vaccine     Obesity     Tendinopathy of rotator cuff     Left knee pain     Benign essential hypertension     Contraception     Chronic pain syndrome     Problems related to other legal circumstances     Degenerative disc disease, lumbar     Medical cannabis use     She presents with late Depo. She is not worried about being pregnant. She hasn't had any bleeding. She has had sex in the last two weeks without a condom. She would like condoms for the two weeks. She is taking lisinopril and discussed the risks of becoming pregnant on this medication. She said if she were to become pregnant she would not want to keep the pregnancy as she already has 4 children. Discussed the risk of hypospadia of the penis if she were to become pregnant and deliver a male infant. She felt that she could do condoms for two weeks and return to clinic to restart her depoprovera.  She also notes that she has white coat hypertension. Upon review of prior office visits, she did have normal BPs.    PMH, Medications and Allergies were reviewed and updated as needed.        REVIEW OF SYSTEMS     See HPI.        OBJECTIVE     Vitals:    08/17/20 1552 08/17/20 1555   BP: (!) 165/103 (!) 148/89   BP Location: Left arm Left arm   Patient Position: Sitting Sitting   Cuff Size: Adult Regular Adult Regular   Pulse: 88 88   Resp: 18    Temp: 98.3  F (36.8  C)    TempSrc: Oral    SpO2: 95%    Weight: (!) 154.2 kg (340 lb)      Body mass index is 55.3 kg/m .    Constitutional: Awake, alert, cooperative, no apparent distress.  Eyes: Lids and lashes normal.  ENT: Normocephalic, without obvious abnormality, atramatic, sinuses nontender on palpation, external ears without lesions, oral pharynx with moist mucus membranes, tonsils without erythema or  exudates, gums normal and good dentition.  Lungs: No increased work of breathing, good air exchange, clear to auscultation bilaterally, no crackles or wheezing.  Neuropsychiatric: Normal affect, mood, orientation, memory and insight.    Results for orders placed or performed in visit on 08/17/20   HCG Qualitative Urine (UPT)  ([facility])     Status: None   Result Value Ref Range    HCG Qual Urine NEGATIVE Negative       ASSESSMENT AND PLAN     Ella was seen today for contraception and medication reconciliation.    Diagnoses and all orders for this visit:    Contraception: depo is late 3 weeks. UPT negative today. She will do two weeks of condoms and then return for depo and UPT in two weeks.  -     HCG Qualitative Urine (UPT)  ([facility])    Essential hypertension: will monitor. Elevated today in clinic, but prior visits have been fine. Will reassess at visit next week. She would like to remain on this medication until her next office visit despite the pregnancy risks.        RTC in 2 weeks for follow up of UPT/Depo or sooner if develops new or worsening symptoms.    Aura Moe MD PGY3  I precepted today with Dr. Frey.

## 2020-08-31 ENCOUNTER — HOSPITAL ENCOUNTER (OUTPATIENT)
Dept: MAMMOGRAPHY | Facility: CLINIC | Age: 40
Discharge: HOME | End: 2020-08-31

## 2020-08-31 ENCOUNTER — OFFICE VISIT (OUTPATIENT)
Dept: FAMILY MEDICINE | Facility: CLINIC | Age: 40
End: 2020-08-31
Payer: COMMERCIAL

## 2020-08-31 ENCOUNTER — RECORDS - HEALTHEAST (OUTPATIENT)
Dept: ADMINISTRATIVE | Facility: OTHER | Age: 40
End: 2020-08-31

## 2020-08-31 VITALS
WEIGHT: 293 LBS | RESPIRATION RATE: 16 BRPM | TEMPERATURE: 98.4 F | OXYGEN SATURATION: 97 % | HEART RATE: 92 BPM | DIASTOLIC BLOOD PRESSURE: 101 MMHG | BODY MASS INDEX: 54.49 KG/M2 | SYSTOLIC BLOOD PRESSURE: 137 MMHG

## 2020-08-31 DIAGNOSIS — R22.32 MASS OF LEFT AXILLA: Primary | ICD-10-CM

## 2020-08-31 DIAGNOSIS — R22.32 MASS OF LEFT AXILLA: ICD-10-CM

## 2020-08-31 DIAGNOSIS — L72.3 INFECTED SEBACEOUS CYST: Primary | ICD-10-CM

## 2020-08-31 DIAGNOSIS — Z78.9 USES BIRTH CONTROL: ICD-10-CM

## 2020-08-31 DIAGNOSIS — L08.9 INFECTED SEBACEOUS CYST: Primary | ICD-10-CM

## 2020-08-31 DIAGNOSIS — Z32.00 PREGNANCY EXAMINATION OR TEST, PREGNANCY UNCONFIRMED: ICD-10-CM

## 2020-08-31 LAB — HCG UR QL: NEGATIVE

## 2020-08-31 RX ORDER — ACETAMINOPHEN 500 MG
1000 TABLET ORAL 3 TIMES DAILY PRN
Qty: 100 TABLET | Refills: 0 | Status: SHIPPED | OUTPATIENT
Start: 2020-08-31 | End: 2020-12-15

## 2020-08-31 RX ORDER — MEDROXYPROGESTERONE ACETATE 150 MG/ML
150 INJECTION, SUSPENSION INTRAMUSCULAR
Status: DISCONTINUED | OUTPATIENT
Start: 2020-08-31 | End: 2021-08-03

## 2020-08-31 RX ORDER — IBUPROFEN 200 MG
800 TABLET ORAL EVERY 8 HOURS PRN
Qty: 100 TABLET | Refills: 0 | Status: SHIPPED | OUTPATIENT
Start: 2020-08-31 | End: 2023-01-24

## 2020-08-31 RX ORDER — CEPHALEXIN 500 MG/1
500 CAPSULE ORAL 4 TIMES DAILY
Qty: 40 CAPSULE | Refills: 0 | Status: SHIPPED | OUTPATIENT
Start: 2020-08-31 | End: 2020-09-08

## 2020-08-31 RX ADMIN — MEDROXYPROGESTERONE ACETATE 150 MG: 150 INJECTION, SUSPENSION INTRAMUSCULAR at 09:18

## 2020-08-31 NOTE — PATIENT INSTRUCTIONS
20   [facility]   [phone]  Fax Orders to [phone]    [address]  [facility] [address]    Appointment:  Today   Arrival Time:  1:15pm    Please bring a copy of your insurance card and photo ID    If you cannot make this appointment please call [phone] to reschedule    Order faxed to [facility] Scheduling at [phone].    Mikaela Turner

## 2020-08-31 NOTE — PROGRESS NOTES
[facility]         SUBJECTIVE   Ella Briggs is a 40 year old female with a PMH of:  Patient Active Problem List   Diagnosis     Need for prophylactic vaccination with measles-mumps-rubella (MMR) vaccine     Obesity     Tendinopathy of rotator cuff     Left knee pain     Benign essential hypertension     Contraception     Chronic pain syndrome     Problems related to other legal circumstances     Degenerative disc disease, lumbar     Medical cannabis use     presenting to clinic today for her Depo-Provera injection.  She also reports a 3 to 4-day history of a painful bump in her left axilla.  She states that no lesion was present prior to 4 days ago.  She states that she has a history of plugged glands in that area, which usually resolve with warm compresses.  However, she states that the area is becoming larger and more painful.  She has noticed a very small focal area of redness of the skin as well.    Her blood pressure was elevated today.  She states that she has whitecoat hypertension.  She states that the pain associated with her axilla may also be contributing to her hypertension.  She does check her blood pressure at home.  She states her systolic is typically 130.  Her diastolic ranges from the 50s to the 80s.    She has been on Depo-Provera since 2018.  She states that this works well for her.  She does not have any concerns regarding this.    PMH, Medications and Allergies were reviewed and updated as needed.    ROS:  General: No fevers, chills  Head: No headache  Ears: No acute change in hearing.    CV: No chest pain or palpitations.  Resp: No shortness of breath.  No cough. No hemoptysis.  GI: No nausea, vomiting, constipation, diarrhea  : No urinary pains    Current Outpatient Medications   Medication Sig Dispense Refill     acetaminophen (TYLENOL) 325 MG tablet Take 1-2 tablets (325-650 mg) by mouth every 6 hours as needed for headaches 100 tablet 3     hydrOXYzine (ATARAX)  50 MG tablet Take 1-2 tablets ( mg) by mouth nightly as needed (sleep) 60 tablet 1     lisinopril (ZESTRIL) 40 MG tablet TAKE 1/2 TABLET(20 MG) BY MOUTH DAILY 45 tablet 1     medical cannabis (Patient's own supply.  Not a prescription) (This is NOT a prescription, and does not certify that the patient has a qualifying medical condition for medical cannabis.  The purpose of this order is  to document that the patient reports taking medical cannabis.) 0 Information only 0     medroxyPROGESTERone (DEPO-PROVERA) 150 MG/ML injection Inject 1 mL (150 mg) into the muscle every 3 months 0.9 mL 3     order for DME Equipment being ordered: wedge cushion 1 each 0     ORDER FOR DME Equipment being ordered: LARGE bp cuff 1 Device 0     ranitidine (ZANTAC) 150 MG tablet Take 1 tablet (150 mg) by mouth 2 times daily 180 tablet 3     Skin Protectants, Misc. (EUCERIN) cream Apply  topically as needed for dry skin. 500 g 1     topiramate (TOPAMAX) 50 MG tablet Take 1 tablet (50 mg) by mouth 2 times daily 180 tablet 3     celecoxib (CELEBREX) 200 MG capsule Take 1 capsule (200 mg) by mouth 2 times daily as needed (back pain) (Patient not taking: Reported on 1/31/2020) 60 capsule 1            OBJECTIVE:   Vitals:   Vitals:    08/31/20 0815 08/31/20 0817   BP: (!) 141/91 (!) 137/101   BP Location: Left arm Left arm   Patient Position: Sitting Sitting   Cuff Size: Adult Large Adult Large   Pulse: 92    Resp: 16    Temp: 98.4  F (36.9  C)    TempSrc: Oral    SpO2: 97%    Weight: (!) 152 kg (335 lb)      BMI: Body mass index is 54.49 kg/m .    Gen:  Well nourished and in no acute distress  HEENT: Extraocular movement intact.  Neck: Supple without lymphadenopathy  CV:  RRR  - no murmurs noted   Pulm:  CTAB, no wheezes or crackles noted, good air entry   ABD: Soft, nontender, no masses, no rebound, BS intact throughout, no hepatosplenomegaly  Extrem: No cyanosis, edema or clubbing  Psych: Euthymic  Skin: Small area less than 0.5 cm of  erythema on left axilla, palpable mass deep in subcutaneous tissue, well-circumscribed, firm, and mobile          ASSESSMENT and PLAN:   Ella was seen today for contraception, derm problem and refill request.    Diagnoses and all orders for this visit:    Mass of left axilla  -     US Axillary Left; Future  -     ibuprofen (ADVIL/MOTRIN) 200 MG tablet; Take 4 tablets (800 mg) by mouth every 8 hours as needed for mild pain or moderate pain  -     acetaminophen (TYLENOL) 500 MG tablet; Take 2 tablets (1,000 mg) by mouth 3 times daily as needed for mild pain  Mass in left axilla feels solid.  High suspicion for fat necrosis.  Low suspicion for abscess or area that needs to be drained.  Will obtain ultrasound to further characterize the lesion.  Will treat pain with ibuprofen and Tylenol as needed.  Advised her to follow-up in 1 to 2 weeks if her symptoms are not improving.    Pregnancy examination or test, pregnancy unconfirmed  -     HCG Qualitative Urine (UPT)  ([facility])    Contraception  -     medroxyPROGESTERone (DEPO-PROVERA) injection 150 mg    Return to clinic in 1-2 weeks for follow up of left axilla mass. Return sooner if develops new or worsening symptoms.    Options for treatment and/or follow-up care were reviewed with the patient was actively involved in the decision making process. Patient verbalized understanding and was in agreement with the plan.    The patient was seen by and discussed with Bam Vargas MD.  Monica John MD PGY3

## 2020-08-31 NOTE — PROGRESS NOTES
Preceptor Attestation:    Patient seen and evaluated in person. I discussed the patient with the resident. I have verified the content of the note, which accurately reflects my assessment of the patient and the plan of care.   Supervising Physician:  Bam Vargas MD.

## 2020-08-31 NOTE — NURSING NOTE
Clinic Administered Medication Documentation      Depo Provera Documentation    URINE HCG: negative    Depo-Provera Standing Order inclusion/exclusion criteria reviewed.   Patient meets: inclusion criteria     BP: 137/101  LAST PAP/EXAM: No results found for: PAP    Prior to injection, verified patient identity using patient's name and date of birth. Medication was administered. Please see MAR and medication order for additional information.     Was entire vial of medication used? Yes  Vial/Syringe: Single dose vial  Expiration Date:  01/2021    Patient instructed to remain in clinic for 15 minutes.  NEXT INJECTION DUE: 11/17/20 - 12/01/20      Name of provider who requested the medication administration: Dr. Vargas  Name of provider on site (faculty or community preceptor) at the time of performing the medication administration: Dr. Vargas    Date of next administration: 11/17/20 - 12/01/20  Date of next office visit with provider to renew medication plan (must be seen annually): 08/31/2021

## 2020-09-02 ENCOUNTER — RECORDS - HEALTHEAST (OUTPATIENT)
Dept: SCHEDULING | Facility: CLINIC | Age: 40
End: 2020-09-02

## 2020-09-02 DIAGNOSIS — R22.32 MASS OF LEFT AXILLA: ICD-10-CM

## 2020-09-08 ENCOUNTER — OFFICE VISIT (OUTPATIENT)
Dept: FAMILY MEDICINE | Facility: CLINIC | Age: 40
End: 2020-09-08
Payer: COMMERCIAL

## 2020-09-08 VITALS
OXYGEN SATURATION: 99 % | BODY MASS INDEX: 55.4 KG/M2 | WEIGHT: 293 LBS | SYSTOLIC BLOOD PRESSURE: 137 MMHG | DIASTOLIC BLOOD PRESSURE: 87 MMHG | TEMPERATURE: 98.3 F | HEART RATE: 91 BPM | RESPIRATION RATE: 20 BRPM

## 2020-09-08 DIAGNOSIS — L08.9 INFECTED SEBACEOUS CYST: ICD-10-CM

## 2020-09-08 DIAGNOSIS — L72.3 INFECTED SEBACEOUS CYST: ICD-10-CM

## 2020-09-08 RX ORDER — CEPHALEXIN 500 MG/1
500 CAPSULE ORAL 4 TIMES DAILY
Qty: 56 CAPSULE | Refills: 0 | Status: SHIPPED | OUTPATIENT
Start: 2020-09-08 | End: 2020-09-22

## 2020-09-08 NOTE — PROGRESS NOTES
Preceptor Attestation:    Patient seen and evaluated in person. I discussed the patient with the resident. I have verified the content of the note, which accurately reflects my assessment of the patient and the plan of care.   Supervising Physician:  Jevon Vitale MD.

## 2020-09-08 NOTE — PROGRESS NOTES
[facility]         SUBJECTIVE   Ella Briggs is a 40 year old female with a PMH of:  Patient Active Problem List   Diagnosis     Need for prophylactic vaccination with measles-mumps-rubella (MMR) vaccine     Obesity     Tendinopathy of rotator cuff     Left knee pain     Benign essential hypertension     Contraception     Chronic pain syndrome     Problems related to other legal circumstances     Degenerative disc disease, lumbar     Medical cannabis use     presenting to clinic today with a chief complaint of follow-up regarding infected sebaceous cyst and associated subcutaneous abscess in the left axilla.  She was seen in clinic 1 week ago due to left axillary mass and ultrasound showed the noted infection.  She has been taking Keflex 500 mg 4 times daily since then.  She notes that she has had some improvement in the swelling but continues to have significant pain and swelling.  She states that it is still difficult to sleep on her left side.  She is taking approximately 1 dose each of 1000 mg acetaminophen and 800 mg ibuprofen daily as needed.    PMH, Medications and Allergies were reviewed and updated as needed.    ROS:  General: No fevers, chills  Head: No headache  Ears: No acute change in hearing.    CV: No chest pain or palpitations.  Resp: No shortness of breath.  No cough. No hemoptysis.  GI: No nausea, vomiting, constipation, diarrhea  : No urinary pains    Current Outpatient Medications   Medication Sig Dispense Refill     acetaminophen (TYLENOL) 325 MG tablet Take 1-2 tablets (325-650 mg) by mouth every 6 hours as needed for headaches 100 tablet 3     acetaminophen (TYLENOL) 500 MG tablet Take 2 tablets (1,000 mg) by mouth 3 times daily as needed for mild pain 100 tablet 0     cephALEXin (KEFLEX) 500 MG capsule Take 1 capsule (500 mg) by mouth 4 times daily for 10 days 40 capsule 0     hydrOXYzine (ATARAX) 50 MG tablet Take 1-2 tablets ( mg) by mouth nightly as needed  (sleep) 60 tablet 1     ibuprofen (ADVIL/MOTRIN) 200 MG tablet Take 4 tablets (800 mg) by mouth every 8 hours as needed for mild pain or moderate pain 100 tablet 0     lisinopril (ZESTRIL) 40 MG tablet TAKE 1/2 TABLET(20 MG) BY MOUTH DAILY 45 tablet 1     medical cannabis (Patient's own supply.  Not a prescription) (This is NOT a prescription, and does not certify that the patient has a qualifying medical condition for medical cannabis.  The purpose of this order is  to document that the patient reports taking medical cannabis.) 0 Information only 0     medroxyPROGESTERone (DEPO-PROVERA) 150 MG/ML injection Inject 1 mL (150 mg) into the muscle every 3 months 0.9 mL 3     order for DME Equipment being ordered: wedge cushion 1 each 0     ORDER FOR DME Equipment being ordered: LARGE bp cuff 1 Device 0     ranitidine (ZANTAC) 150 MG tablet Take 1 tablet (150 mg) by mouth 2 times daily 180 tablet 3     Skin Protectants, Misc. (EUCERIN) cream Apply  topically as needed for dry skin. 500 g 1     topiramate (TOPAMAX) 50 MG tablet Take 1 tablet (50 mg) by mouth 2 times daily 180 tablet 3          OBJECTIVE:   Vitals:   Vitals:    09/08/20 1008 09/08/20 1010   BP: (!) 145/89 137/87   BP Location: Left arm    Patient Position: Sitting    Cuff Size: Adult Regular    Pulse: 91    Resp: 20    Temp: 98.3  F (36.8  C)    TempSrc: Oral    SpO2: 99%    Weight: (!) 154.5 kg (340 lb 9.6 oz)      BMI: Body mass index is 55.4 kg/m .    Gen:  Well nourished and in no acute distress  HEENT: Extraocular movement intact.  Neck: Supple without lymphadenopathy  CV:  RRR  - no murmurs noted   Pulm:  CTAB, no wheezes or crackles noted, good air entry   ABD: Soft, nontender, no masses, no rebound, BS intact throughout, no hepatosplenomegaly  Extrem: No cyanosis, edema or clubbing  Psych: Euthymic  Skin: Small area less than 0.5 cm of erythema on left axilla, palpable mass deep in subcutaneous tissue, well-circumscribed, firm, and mobile,  decreased in size compared to 1 week ago          ASSESSMENT and PLAN:   Ella was seen today for follow up.    Diagnoses and all orders for this visit:    Infected sebaceous cyst  -     cephALEXin (KEFLEX) 500 MG capsule; Take 1 capsule (500 mg) by mouth 4 times daily for 14 days  -     GENERAL SURG ADULT REFERRAL; Future    Ultrasound showed sebaceous cyst and associated subcutaneous abscess in the left axilla.  This is not amenable to ultrasound-guided aspiration.  Slowly improving on oral antibiotics.  Referral to general surgery placed for their recommendation regarding possible excision.  We will continue Keflex 500 mg 4 times daily until she is evaluated by general surgery.    Return to clinic as evaluation by general surgery for follow up of left axilla abscess. Return sooner if develops new or worsening symptoms.    Options for treatment and/or follow-up care were reviewed with the patient was actively involved in the decision making process. Patient verbalized understanding and was in agreement with the plan.    The patient was seen by and discussed with Jevon Vitale MD.  Monica John MD PGY3

## 2020-09-10 NOTE — PATIENT INSTRUCTIONS
09/10/20   GENERAL SURGERY REFERRAL    [facility]   Phone: [phone]  Fax: [phone]    Referral, demographics and office visit have been faxed to [phone]. They will contact patient to schedule.    Mikaela Turner

## 2020-09-29 ENCOUNTER — AMBULATORY - HEALTHEAST (OUTPATIENT)
Dept: ADMINISTRATIVE | Facility: CLINIC | Age: 40
End: 2020-09-29

## 2020-09-29 DIAGNOSIS — L72.3 SEBACEOUS CYST OF LEFT AXILLA: ICD-10-CM

## 2020-10-16 ENCOUNTER — AMBULATORY - HEALTHEAST (OUTPATIENT)
Dept: SURGERY | Facility: CLINIC | Age: 40
End: 2020-10-16

## 2020-11-23 ENCOUNTER — ALLIED HEALTH/NURSE VISIT (OUTPATIENT)
Dept: FAMILY MEDICINE | Facility: CLINIC | Age: 40
End: 2020-11-23
Payer: COMMERCIAL

## 2020-11-23 VITALS
BODY MASS INDEX: 54.78 KG/M2 | SYSTOLIC BLOOD PRESSURE: 138 MMHG | OXYGEN SATURATION: 100 % | TEMPERATURE: 98.4 F | WEIGHT: 293 LBS | HEART RATE: 87 BPM | DIASTOLIC BLOOD PRESSURE: 99 MMHG

## 2020-11-23 DIAGNOSIS — Z78.9 USES BIRTH CONTROL: Primary | ICD-10-CM

## 2020-11-23 PROCEDURE — 99207 PR NO BILLABLE SERVICE THIS VISIT: CPT

## 2020-11-23 PROCEDURE — 96372 THER/PROPH/DIAG INJ SC/IM: CPT

## 2020-11-23 RX ADMIN — MEDROXYPROGESTERONE ACETATE 150 MG: 150 INJECTION, SUSPENSION INTRAMUSCULAR at 11:44

## 2020-11-23 NOTE — NURSING NOTE
Clinic Administered Medication Documentation      Depo Provera Documentation    URINE HCG: not indicated    Depo-Provera Standing Order inclusion/exclusion criteria reviewed.   Patient meets: inclusion criteria     BP: 138/99[pt declined the 2nd BP and just want the depo shot[  LAST PAP/EXAM: No results found for: PAP    Prior to injection, verified patient identity using patient's name and date of birth. Medication was administered. Please see MAR and medication order for additional information.     Was entire vial of medication used? Yes  Vial/Syringe: Single dose vial  Expiration Date:  4/2022    Patient instructed to remain in clinic for 15 minutes.  NEXT INJECTION DUE: 2/8/21 - 2/22/21      Name of provider who requested the medication administration: Dr. English  Name of provider on site (faculty or community preceptor) at the time of performing the medication administration: Dr. English    Date of next administration: 2/9/21-2/23/21  Date of next office visit with provider to renew medication plan (must be seen annually): 8/31/21    The following medication was given:     MEDICATION: Medroxyprogesterone 150 mg  ROUTE: IM  SITE: Deltoid - Right  DOSE: 1  LOT #: [id number]  :  Pharmacutical  EXPIRATION DATE:  4/2022  NDC#: 6783-7633-81   Medication administered by: Hernan Hollingsworth CMA  Next depo injection is due between 2/9/21 to 2/23/21  Dr. Dr. English was available on site at the time of this service.

## 2020-11-29 ENCOUNTER — HEALTH MAINTENANCE LETTER (OUTPATIENT)
Age: 40
End: 2020-11-29

## 2020-12-12 DIAGNOSIS — I10 BENIGN ESSENTIAL HYPERTENSION: ICD-10-CM

## 2020-12-12 DIAGNOSIS — R22.32 MASS OF LEFT AXILLA: ICD-10-CM

## 2020-12-15 RX ORDER — LISINOPRIL 40 MG/1
TABLET ORAL
Qty: 45 TABLET | Refills: 1 | Status: SHIPPED | OUTPATIENT
Start: 2020-12-15 | End: 2023-01-24

## 2020-12-15 RX ORDER — PSEUDOEPHED/ACETAMINOPH/DIPHEN 30MG-500MG
TABLET ORAL
Qty: 100 TABLET | Refills: 0 | Status: SHIPPED | OUTPATIENT
Start: 2020-12-15 | End: 2023-01-24

## 2021-01-11 ENCOUNTER — TELEPHONE (OUTPATIENT)
Dept: FAMILY MEDICINE | Facility: CLINIC | Age: 41
End: 2021-01-11

## 2021-01-11 DIAGNOSIS — R63.2 BINGE EATING: ICD-10-CM

## 2021-01-14 RX ORDER — TOPIRAMATE 50 MG/1
50 TABLET, FILM COATED ORAL 2 TIMES DAILY
Qty: 180 TABLET | Refills: 0 | Status: SHIPPED | OUTPATIENT
Start: 2021-01-14 | End: 2023-01-24

## 2021-02-16 ENCOUNTER — ALLIED HEALTH/NURSE VISIT (OUTPATIENT)
Dept: FAMILY MEDICINE | Facility: CLINIC | Age: 41
End: 2021-02-16
Payer: COMMERCIAL

## 2021-02-16 DIAGNOSIS — Z30.42 ENCOUNTER FOR SURVEILLANCE OF INJECTABLE CONTRACEPTIVE: Primary | ICD-10-CM

## 2021-02-16 PROCEDURE — 96372 THER/PROPH/DIAG INJ SC/IM: CPT

## 2021-02-16 RX ORDER — MEDROXYPROGESTERONE ACETATE 150 MG/ML
150 INJECTION, SUSPENSION INTRAMUSCULAR
Status: ACTIVE | OUTPATIENT
Start: 2021-02-16 | End: 2022-02-11

## 2021-02-16 RX ADMIN — MEDROXYPROGESTERONE ACETATE 150 MG: 150 INJECTION, SUSPENSION INTRAMUSCULAR at 11:00

## 2021-02-16 NOTE — PROGRESS NOTES
Clinic Administered Medication Documentation      Depo Provera Documentation    URINE HCG: not indicated    Depo-Provera Standing Order inclusion/exclusion criteria reviewed.   Patient meets: inclusion criteria     BP: Data Unavailable  LAST PAP/EXAM: No results found for: PAP    Prior to injection, verified patient identity using patient's name and date of birth. Medication was administered. Please see MAR and medication order for additional information.     Was entire vial of medication used? Yes  Vial/Syringe: Single dose vial  Expiration Date:  06/2022    Patient instructed to report any adverse reaction to staff immediately .  NEXT INJECTION DUE: 5/4/21 - 5/18/21      Name of provider who requested the medication administration: Amador  Name of provider on site (faculty or community preceptor) at the time of performing the medication administration: Fallert    Date of next administration: 05/04/21 - 05/18/21  Date of next office visit with provider to renew medication plan (must be seen annually): 8/17/21

## 2021-05-17 ENCOUNTER — ALLIED HEALTH/NURSE VISIT (OUTPATIENT)
Dept: FAMILY MEDICINE | Facility: CLINIC | Age: 41
End: 2021-05-17
Payer: COMMERCIAL

## 2021-05-17 VITALS — OXYGEN SATURATION: 97 % | TEMPERATURE: 98.4 F | HEART RATE: 102 BPM

## 2021-05-17 DIAGNOSIS — Z78.9 USES BIRTH CONTROL: Primary | ICD-10-CM

## 2021-05-17 PROCEDURE — 96372 THER/PROPH/DIAG INJ SC/IM: CPT

## 2021-05-17 RX ADMIN — MEDROXYPROGESTERONE ACETATE 150 MG: 150 INJECTION, SUSPENSION INTRAMUSCULAR at 11:13

## 2021-05-17 NOTE — NURSING NOTE
Clinic Administered Medication Documentation      Depo Provera Documentation    URINE HCG: not indicated    Depo-Provera Standing Order inclusion/exclusion criteria reviewed.   Patient meets: inclusion criteria     BP: Data Unavailable  LAST PAP/EXAM: No results found for: PAP    Prior to injection, verified patient identity using patient's name and date of birth. Medication was administered. Please see MAR and medication order for additional information.     Was entire vial of medication used? Yes  Vial/Syringe: Single dose vial  Expiration Date:  08/2022    Patient instructed to remain in clinic for 15 minutes.  NEXT INJECTION DUE: 8/3/21 - 8/17/21      Name of provider who requested the medication administration: Dr. Vargas  Name of provider on site (faculty or community preceptor) at the time of performing the medication administration: Dr. Vargas    Date of next administration: 8/3/21 - 8/17/21  Date of next office visit with provider to renew medication plan (must be seen annually): 08/17/2021 (NEEDS TO SEE PROVIDER)

## 2021-05-21 ENCOUNTER — HOSPITAL ENCOUNTER (EMERGENCY)
Dept: EMERGENCY MEDICINE | Facility: HOSPITAL | Age: 41
Discharge: HOME | End: 2021-05-21
Attending: EMERGENCY MEDICINE
Payer: COMMERCIAL

## 2021-05-21 DIAGNOSIS — R07.89 ATYPICAL CHEST PAIN: ICD-10-CM

## 2021-05-21 DIAGNOSIS — R00.2 HEART PALPITATIONS: ICD-10-CM

## 2021-05-21 LAB
ANION GAP SERPL CALCULATED.3IONS-SCNC: 12 MMOL/L (ref 5–18)
BUN SERPL-MCNC: 8 MG/DL (ref 8–22)
CALCIUM SERPL-MCNC: 8.6 MG/DL (ref 8.5–10.5)
CHLORIDE BLD-SCNC: 108 MMOL/L (ref 98–107)
CO2 SERPL-SCNC: 23 MMOL/L (ref 22–31)
CREAT SERPL-MCNC: 0.71 MG/DL (ref 0.6–1.1)
D DIMER PPP FEU-MCNC: 0.38 FEU UG/ML
ERYTHROCYTE [DISTWIDTH] IN BLOOD BY AUTOMATED COUNT: 12.1 % (ref 11–14.5)
GFR SERPL CREATININE-BSD FRML MDRD: >60 ML/MIN/1.73M2
GLUCOSE BLD-MCNC: 121 MG/DL (ref 70–125)
HCT VFR BLD AUTO: 45.4 % (ref 35–47)
HGB BLD-MCNC: 15.1 G/DL (ref 12–16)
MAGNESIUM SERPL-MCNC: 2 MG/DL (ref 1.8–2.6)
MCH RBC QN AUTO: 31.9 PG (ref 27–34)
MCHC RBC AUTO-ENTMCNC: 33.3 G/DL (ref 32–36)
MCV RBC AUTO: 96 FL (ref 80–100)
PLATELET # BLD AUTO: 303 THOU/UL (ref 140–440)
PMV BLD AUTO: 10.4 FL (ref 8.5–12.5)
POC PREG URINE (HCG) HE - HISTORICAL: NEGATIVE
POCT KIT EXPIRATION DATE HE - HISTORICAL: NORMAL
POCT KIT LOT NUMBER HE - HISTORICAL: NORMAL
POCT NEGATIVE CONTROL HE - HISTORICAL: NORMAL
POCT POSITIVE CONTROL HE - HISTORICAL: NORMAL
POTASSIUM BLD-SCNC: 3.5 MMOL/L (ref 3.5–5)
RBC # BLD AUTO: 4.74 MILL/UL (ref 3.8–5.4)
SODIUM SERPL-SCNC: 143 MMOL/L (ref 136–145)
TROPONIN I SERPL-MCNC: 0.02 NG/ML (ref 0–0.29)
WBC: 8.6 THOU/UL (ref 4–11)

## 2021-05-21 ASSESSMENT — MIFFLIN-ST. JEOR: SCORE: 2256.65

## 2021-05-29 ENCOUNTER — RECORDS - HEALTHEAST (OUTPATIENT)
Dept: ADMINISTRATIVE | Facility: CLINIC | Age: 41
End: 2021-05-29

## 2021-05-30 ENCOUNTER — RECORDS - HEALTHEAST (OUTPATIENT)
Dept: ADMINISTRATIVE | Facility: CLINIC | Age: 41
End: 2021-05-30

## 2021-06-01 LAB
ATRIAL RATE - MUSE: 110 BPM
DIASTOLIC BLOOD PRESSURE - MUSE: NORMAL
INTERPRETATION ECG - MUSE: NORMAL
P AXIS - MUSE: 54 DEGREES
PR INTERVAL - MUSE: 152 MS
QRS DURATION - MUSE: 80 MS
QT - MUSE: 332 MS
QTC - MUSE: 449 MS
R AXIS - MUSE: 76 DEGREES
SYSTOLIC BLOOD PRESSURE - MUSE: NORMAL
T AXIS - MUSE: 37 DEGREES
VENTRICULAR RATE- MUSE: 110 BPM

## 2021-06-02 ENCOUNTER — RECORDS - HEALTHEAST (OUTPATIENT)
Dept: ADMINISTRATIVE | Facility: CLINIC | Age: 41
End: 2021-06-02
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
## 2021-07-06 VITALS — BODY MASS INDEX: 47.09 KG/M2 | HEIGHT: 66 IN | WEIGHT: 293 LBS

## 2021-08-03 ENCOUNTER — OFFICE VISIT (OUTPATIENT)
Dept: FAMILY MEDICINE | Facility: CLINIC | Age: 41
End: 2021-08-03
Payer: COMMERCIAL

## 2021-08-03 VITALS
HEART RATE: 89 BPM | RESPIRATION RATE: 18 BRPM | TEMPERATURE: 98 F | DIASTOLIC BLOOD PRESSURE: 94 MMHG | SYSTOLIC BLOOD PRESSURE: 142 MMHG

## 2021-08-03 DIAGNOSIS — I10 BENIGN ESSENTIAL HYPERTENSION: ICD-10-CM

## 2021-08-03 DIAGNOSIS — Z30.42 ENCOUNTER FOR SURVEILLANCE OF INJECTABLE CONTRACEPTIVE: Primary | ICD-10-CM

## 2021-08-03 DIAGNOSIS — I10 ESSENTIAL HYPERTENSION: ICD-10-CM

## 2021-08-03 LAB — HCG UR QL: NEGATIVE

## 2021-08-03 PROCEDURE — 96372 THER/PROPH/DIAG INJ SC/IM: CPT | Performed by: FAMILY MEDICINE

## 2021-08-03 PROCEDURE — 99213 OFFICE O/P EST LOW 20 MIN: CPT | Mod: 25 | Performed by: FAMILY MEDICINE

## 2021-08-03 PROCEDURE — 81025 URINE PREGNANCY TEST: CPT | Performed by: FAMILY MEDICINE

## 2021-08-03 RX ORDER — LISINOPRIL 20 MG/1
20 TABLET ORAL DAILY
Qty: 90 TABLET | Refills: 3 | Status: SHIPPED | OUTPATIENT
Start: 2021-08-03 | End: 2023-03-15

## 2021-08-03 RX ORDER — MEDROXYPROGESTERONE ACETATE 150 MG/ML
150 INJECTION, SUSPENSION INTRAMUSCULAR
Status: COMPLETED | OUTPATIENT
Start: 2021-08-03 | End: 2022-04-15

## 2021-08-03 RX ADMIN — MEDROXYPROGESTERONE ACETATE 150 MG: 150 INJECTION, SUSPENSION INTRAMUSCULAR at 16:47

## 2021-08-03 NOTE — PROGRESS NOTES
Assessment & Plan   Contraception management, with plan to continue Depo.  We briefly discussed other options, but the injectable is working well.  No contraindications.  UPT negative today.  She is within the window of repeat injection.  -- Medroxyprogesterone (Depo) 150 mg IM g5rdyeig x1 year.    Subjective   Ella Briggs is a 39 year old female with a history including obesity and HTN who presents for consideration of another year of Depo use for contraception.    She has been on Depo for contraception, and it works well for her.  She denies history of liver disease, cancer, or blood clots.  She has a history of HTN, but blood pressures are 120s-130s/80s on lisinopril 40.  She is a former smoker.    Social: She reports that she has quit smoking. She has never used smokeless tobacco.    Objective   Vitals: BP (!) 142/94 (BP Location: Left arm, Patient Position: Sitting, Cuff Size: Adult Large)   Pulse 89   Temp 98  F (36.7  C) (Oral)   Resp 18   General: Pleasant. Young woman. Obese. No distress.  Heart: Regular rate and rhythm. No murmurs, rubs, or gallops.  Lungs: Clear to auscultation bilaterally. No wheezes or crackles. Good air movement.  Psych: Appropriate grooming and hygiene. Speech normal rate. Appropriate mood and affect.    Labs:  Results for orders placed or performed in visit on 08/03/21   HCG qualitative urine     Status: Normal   Result Value Ref Range    hCG Urine Qualitative Negative Negative

## 2021-08-03 NOTE — PATIENT INSTRUCTIONS
Info for Elva's heart test:  [facility]  [address]   [place] [address]   Phone: [phone]

## 2021-08-03 NOTE — NURSING NOTE
Clinic Administered Medication Documentation    Administrations This Visit     medroxyPROGESTERone (DEPO-PROVERA) injection 150 mg     Admin Date  08/03/2021 Action  Given Dose  150 mg Route  Intramuscular Site  Right Deltoid Administered By  Tiffany Nayak CMA    Ordering Provider: French Lal MD    Patient Supplied?: No    Admin Date  08/03/2021 Action  Given Dose  150 mg Route  Intramuscular Site  Right Deltoid Administered By  Tiffany Nayak CMA    Ordering Provider: Nydia English MD    NDC: 31201-062-44    Lot#: [id number]    : [facility]    Patient Supplied?: No                  Depo Provera Documentation    URINE HCG: not indicated    Depo-Provera Standing Order inclusion/exclusion criteria reviewed. ad  Patient meets: inclusion criteria     BP: 142/94  LAST PAP/EXAM: No results found for: PAP    Prior to injection, verified patient identity using patient's name and date of birth. Medication was administered. Please see MAR and medication order for additional information.     Was entire vial of medication used? Yes  Vial/Syringe: Single dose vial  Expiration Date:  12-    Patient instructed to remain in clinic for 15 minutes.  NEXT INJECTION DUE: 10/20/21 - 11/03/21      Name of provider who requested the medication administration: Dr. English  Name of provider on site (faculty or community preceptor) at the time of performing the medication administration: Dr. English    Date of next administration: 10/20/2021 - 11/03/2021  Date of next office visit with provider to renew medication plan (must be seen annually): 07-      Administered by ROSEANN Ellington

## 2021-08-24 ENCOUNTER — IMMUNIZATION (OUTPATIENT)
Dept: FAMILY MEDICINE | Facility: CLINIC | Age: 41
End: 2021-08-24
Payer: COMMERCIAL

## 2021-08-24 DIAGNOSIS — Z23 HIGH PRIORITY FOR 2019-NCOV VACCINE: Primary | ICD-10-CM

## 2021-08-24 PROCEDURE — 91301 PR COVID VAC MODERNA 100 MCG/0.5 ML IM: CPT

## 2021-08-24 PROCEDURE — 0011A PR COVID VAC MODERNA 100 MCG/0.5 ML IM: CPT

## 2021-08-24 PROCEDURE — 99207 PR NO CHARGE LOS: CPT | Performed by: FAMILY MEDICINE

## 2021-09-21 ENCOUNTER — IMMUNIZATION (OUTPATIENT)
Dept: FAMILY MEDICINE | Facility: CLINIC | Age: 41
End: 2021-09-21
Attending: FAMILY MEDICINE
Payer: COMMERCIAL

## 2021-09-21 PROCEDURE — 91301 PR COVID VAC MODERNA 100 MCG/0.5 ML IM: CPT

## 2021-09-21 PROCEDURE — 0012A PR COVID VAC MODERNA 100 MCG/0.5 ML IM: CPT

## 2021-09-25 ENCOUNTER — HEALTH MAINTENANCE LETTER (OUTPATIENT)
Age: 41
End: 2021-09-25

## 2021-10-28 ENCOUNTER — ALLIED HEALTH/NURSE VISIT (OUTPATIENT)
Dept: FAMILY MEDICINE | Facility: CLINIC | Age: 41
End: 2021-10-28
Payer: COMMERCIAL

## 2021-10-28 VITALS — HEART RATE: 90 BPM | TEMPERATURE: 98.2 F | OXYGEN SATURATION: 97 %

## 2021-10-28 DIAGNOSIS — Z30.42 ENCOUNTER FOR SURVEILLANCE OF INJECTABLE CONTRACEPTIVE: Primary | ICD-10-CM

## 2021-10-28 PROCEDURE — 99207 PR NO BILLABLE SERVICE THIS VISIT: CPT

## 2021-10-28 PROCEDURE — 96372 THER/PROPH/DIAG INJ SC/IM: CPT | Performed by: STUDENT IN AN ORGANIZED HEALTH CARE EDUCATION/TRAINING PROGRAM

## 2021-10-28 RX ADMIN — MEDROXYPROGESTERONE ACETATE 150 MG: 150 INJECTION, SUSPENSION INTRAMUSCULAR at 11:15

## 2021-10-28 NOTE — NURSING NOTE
Clinic Administered Medication Documentation    Administrations This Visit     medroxyPROGESTERone (DEPO-PROVERA) injection 150 mg     Admin Date  10/28/2021 Action  Given Dose  150 mg Route  Intramuscular Site  Right Deltoid Administered By  Maura Sears CMA    Ordering Provider: Nydia English MD    NDC: 59088-763-88    Lot#: [id number]    : [facility]    Patient Supplied?: No                  Depo Provera Documentation    URINE HCG: not indicated    Depo-Provera Standing Order inclusion/exclusion criteria reviewed.   Patient meets: inclusion criteria     BP: Data Unavailable  LAST PAP/EXAM: No results found for: PAP    Prior to injection, verified patient identity using patient's name and date of birth. Medication was administered. Please see MAR and medication order for additional information.     Was entire vial of medication used? Yes  Vial/Syringe: Single dose vial  Expiration Date: 4/30/2023    Patient instructed to remain in clinic for 15 minutes.  NEXT INJECTION DUE: 1/14/22 - 1/28/22      Name of provider who requested the medication administration: Dr. Jorge  Name of provider on site (faculty or community preceptor) at the time of performing the medication administration: Dr. Jorge    Date of next administration: 1/14/22 - 1/28/22  Date of next office visit with provider to renew medication plan (must be seen annually): 08/3/2022

## 2022-01-15 ENCOUNTER — HEALTH MAINTENANCE LETTER (OUTPATIENT)
Age: 42
End: 2022-01-15

## 2022-01-26 ENCOUNTER — ALLIED HEALTH/NURSE VISIT (OUTPATIENT)
Dept: FAMILY MEDICINE | Facility: CLINIC | Age: 42
End: 2022-01-26
Payer: COMMERCIAL

## 2022-01-26 VITALS
SYSTOLIC BLOOD PRESSURE: 145 MMHG | DIASTOLIC BLOOD PRESSURE: 97 MMHG | TEMPERATURE: 98.4 F | HEART RATE: 95 BPM | OXYGEN SATURATION: 96 %

## 2022-01-26 DIAGNOSIS — Z30.42 ENCOUNTER FOR SURVEILLANCE OF INJECTABLE CONTRACEPTIVE: Primary | ICD-10-CM

## 2022-01-26 PROCEDURE — 96372 THER/PROPH/DIAG INJ SC/IM: CPT | Performed by: FAMILY MEDICINE

## 2022-01-26 RX ADMIN — MEDROXYPROGESTERONE ACETATE 150 MG: 150 INJECTION, SUSPENSION INTRAMUSCULAR at 14:14

## 2022-01-26 NOTE — NURSING NOTE
Clinic Administered Medication Documentation          Depo Provera Documentation    URINE HCG: not indicated    Depo-Provera Standing Order inclusion/exclusion criteria reviewed.   Patient meets: inclusion criteria     BP: 145/97  LAST PAP/EXAM: No results found for: PAP    Prior to injection, verified patient identity using patient's name and date of birth. Medication was administered. Please see MAR and medication order for additional information.     Was entire vial of medication used? Yes  Vial/Syringe: Single dose vial  Expiration Date:  8/2023    Patient instructed to remain in clinic for 15 minutes.  NEXT INJECTION DUE: 4/13/22 - 4/27/22      Name of provider who requested the medication administration: Dr. English  Name of provider on site (faculty or community preceptor) at the time of performing the medication administration: Dr. Vargas    Date of next administration: 4/13/22-4/27/22  Date of next office visit with provider to renew medication plan (must be seen annually): 8/3/22

## 2022-02-17 PROBLEM — G89.4 CHRONIC PAIN SYNDROME: Status: ACTIVE | Noted: 2018-03-23

## 2022-04-15 ENCOUNTER — OFFICE VISIT (OUTPATIENT)
Dept: FAMILY MEDICINE | Facility: CLINIC | Age: 42
End: 2022-04-15
Payer: COMMERCIAL

## 2022-04-15 VITALS
TEMPERATURE: 97.8 F | OXYGEN SATURATION: 96 % | DIASTOLIC BLOOD PRESSURE: 99 MMHG | HEART RATE: 102 BPM | SYSTOLIC BLOOD PRESSURE: 160 MMHG

## 2022-04-15 DIAGNOSIS — Z30.42 ENCOUNTER FOR SURVEILLANCE OF INJECTABLE CONTRACEPTIVE: Primary | ICD-10-CM

## 2022-04-15 DIAGNOSIS — Z23 HIGH PRIORITY FOR 2019-NCOV VACCINE: ICD-10-CM

## 2022-04-15 PROCEDURE — 0064A COVID-19,PF,MODERNA (18+ YRS BOOSTER .25ML): CPT

## 2022-04-15 PROCEDURE — 96372 THER/PROPH/DIAG INJ SC/IM: CPT | Performed by: FAMILY MEDICINE

## 2022-04-15 PROCEDURE — 91306 COVID-19,PF,MODERNA (18+ YRS BOOSTER .25ML): CPT

## 2022-04-15 RX ADMIN — MEDROXYPROGESTERONE ACETATE 150 MG: 150 INJECTION, SUSPENSION INTRAMUSCULAR at 09:57

## 2022-04-15 NOTE — NURSING NOTE
Clinic Administered Medication Documentation    Administrations This Visit     medroxyPROGESTERone (DEPO-PROVERA) injection 150 mg     Admin Date  04/15/2022 Action  Given Dose  150 mg Route  Intramuscular Site  Right Deltoid Administered By  Hernan Hollingsworth CMA    Ordering Provider: Nydia English MD    NDC: 91049-217-03    Lot#: [id number]    : [facility]    Patient Supplied?: No                  Depo Provera Documentation    URINE HCG: not indicated    Depo-Provera Standing Order inclusion/exclusion criteria reviewed.   Patient meets: inclusion criteria     BP: 160/99[pt states that she just took her BP med before coming[  LAST PAP/EXAM: No results found for: PAP    Prior to injection, verified patient identity using patient's name and date of birth. Medication was administered. Please see MAR and medication order for additional information.     Was entire vial of medication used? Yes  Vial/Syringe: Single dose vial  Expiration Date:  9/2023    Patient instructed to remain in clinic for 15 minutes and report any adverse reaction to staff immediately .  NEXT INJECTION DUE: 7/2/22 - 7/16/22      Name of provider who requested the medication administration: Dr. English  Name of provider on site (faculty or community preceptor) at the time of performing the medication administration: Dr. English    Date of next administration: 7/2/22-7/16/22  Date of next office visit with provider to renew medication plan (must be seen annually): 8/3/22- pt was told to schedule with her PCP for her next depo renewal

## 2022-07-12 ENCOUNTER — ALLIED HEALTH/NURSE VISIT (OUTPATIENT)
Dept: FAMILY MEDICINE | Facility: CLINIC | Age: 42
End: 2022-07-12
Payer: COMMERCIAL

## 2022-07-12 VITALS
DIASTOLIC BLOOD PRESSURE: 104 MMHG | OXYGEN SATURATION: 95 % | SYSTOLIC BLOOD PRESSURE: 139 MMHG | TEMPERATURE: 98.1 F | HEART RATE: 90 BPM

## 2022-07-12 DIAGNOSIS — Z30.42 ENCOUNTER FOR SURVEILLANCE OF INJECTABLE CONTRACEPTIVE: Primary | ICD-10-CM

## 2022-07-12 PROCEDURE — 96372 THER/PROPH/DIAG INJ SC/IM: CPT | Performed by: FAMILY MEDICINE

## 2022-07-12 RX ORDER — MEDROXYPROGESTERONE ACETATE 150 MG/ML
150 INJECTION, SUSPENSION INTRAMUSCULAR
Status: COMPLETED | OUTPATIENT
Start: 2022-07-12 | End: 2023-03-22

## 2022-07-12 RX ADMIN — MEDROXYPROGESTERONE ACETATE 150 MG: 150 INJECTION, SUSPENSION INTRAMUSCULAR at 11:15

## 2022-07-12 NOTE — NURSING NOTE
Clinic Administered Medication Documentation    Administrations This Visit     medroxyPROGESTERone (DEPO-PROVERA) injection 150 mg     Admin Date  07/12/2022 Action  Given Dose  150 mg Route  Intramuscular Site  Right Deltoid Administered By  Hernan Hollingsworth CMA    Ordering Provider: Norris Ramon MD    Patient Supplied?: No    Comments: Reminder card given to pt. Next depo due 9/28/22-10/12/22                  Depo Provera Documentation    URINE HCG: not indicated    Depo-Provera Standing Order inclusion/exclusion criteria reviewed.   Patient meets: inclusion criteria     BP: 139/104[pt has not taken her bP med yet[  LAST PAP/EXAM: No results found for: PAP    Prior to injection, verified patient identity using patient's name and date of birth. Medication was administered. Please see MAR and medication order for additional information.     Was entire vial of medication used? Yes  Vial/Syringe: Single dose vial  Expiration Date:  12/2023    Patient instructed to remain in clinic for 15 minutes and report any adverse reaction to staff immediately .  NEXT INJECTION DUE: 9/28/22 - 10/12/22      Name of provider who requested the medication administration:   Name of provider on site (faculty or community preceptor) at the time of performing the medication administration: Dr. Ramon    Date of next administration: 9/28/22-10/12/22  Date of next office visit with provider to renew medication plan (must be seen annually): 4/15/23

## 2022-10-06 ENCOUNTER — OFFICE VISIT (OUTPATIENT)
Dept: FAMILY MEDICINE | Facility: CLINIC | Age: 42
End: 2022-10-06
Payer: COMMERCIAL

## 2022-10-06 VITALS — TEMPERATURE: 98.7 F | HEART RATE: 101 BPM | OXYGEN SATURATION: 96 % | RESPIRATION RATE: 16 BRPM

## 2022-10-06 DIAGNOSIS — Z30.42 ENCOUNTER FOR SURVEILLANCE OF INJECTABLE CONTRACEPTIVE: Primary | ICD-10-CM

## 2022-10-06 PROCEDURE — 96372 THER/PROPH/DIAG INJ SC/IM: CPT | Performed by: FAMILY MEDICINE

## 2022-10-06 RX ADMIN — MEDROXYPROGESTERONE ACETATE 150 MG: 150 INJECTION, SUSPENSION INTRAMUSCULAR at 14:36

## 2022-10-06 NOTE — NURSING NOTE
Clinic Administered Medication Documentation    Administrations This Visit     medroxyPROGESTERone (DEPO-PROVERA) injection 150 mg     Admin Date  10/06/2022 Action  Given Dose  150 mg Route  Intramuscular Site  Right Deltoid Administered By  Maura Sears CMA    Ordering Provider: Norris Ramon MD    NDC: 50154-281-42    Lot#: [id number]    : [facility]    Patient Supplied?: No                  Depo Provera Documentation    URINE HCG: not indicated    Depo-Provera Standing Order inclusion/exclusion criteria reviewed.   Patient meets: inclusion criteria     BP: Data Unavailable  LAST PAP/EXAM: No results found for: PAP    Prior to injection, verified patient identity using patient's name and date of birth. Medication was administered. Please see MAR and medication order for additional information.     Was entire vial of medication used? Yes  Vial/Syringe: Single dose vial  Expiration Date:  04/30/2024    Patient instructed to remain in clinic for 15 minutes.  NEXT INJECTION DUE: 12/23/22 - 1/6/23      Name of provider who requested the medication administration: Dr. english  Name of provider on site (faculty or community preceptor) at the time of performing the medication administration: Dr. English    Date of next administration: 12/23/22 - 1/6/23  Date of next office visit with provider to renew medication plan (must be seen annually): 04/15/2023

## 2022-12-10 NOTE — NURSING NOTE
I administered the following to Ella Briggs.    MEDICATION: Medroxyprogesterone 150 mg  ROUTE: IM  SITE: Arm - Right  DOSE: 150 mg/ml  LOT #: [id number]  :  [employer]   EXPIRATION DATE:  09/30/2022  NDC#: [address]-9     Was entire vial of medication used? Yes    Did the patient bring this medication to the clinic to be injected? No    Next Depo shot between: 12/14/18 to 01/11/19- Pt needs to schedule office visit for next depo shot    Name of provider who requested the injection: Dr. English  Name of provider on site (faculty or community preceptor) at the time of performing the injection: Dr. Amador Hollingsworth MA         Pt placed on a JON and moved to room 19 for video monitoring.   Security made aware.    Pt informed that he will need to be evaluated by DEC d/t making Suicidal statements.  Pt is okay with this plan. Girlfriend/ Fiance is at bedside per pt request.    Sheila Barlow RN,.......................................... 12/10/2022   2:11 AM

## 2022-12-26 ENCOUNTER — HEALTH MAINTENANCE LETTER (OUTPATIENT)
Age: 42
End: 2022-12-26

## 2022-12-29 ENCOUNTER — ALLIED HEALTH/NURSE VISIT (OUTPATIENT)
Dept: FAMILY MEDICINE | Facility: CLINIC | Age: 42
End: 2022-12-29
Payer: COMMERCIAL

## 2022-12-29 VITALS
TEMPERATURE: 98.2 F | HEART RATE: 100 BPM | OXYGEN SATURATION: 100 % | SYSTOLIC BLOOD PRESSURE: 147 MMHG | DIASTOLIC BLOOD PRESSURE: 100 MMHG

## 2022-12-29 DIAGNOSIS — Z30.42 ENCOUNTER FOR SURVEILLANCE OF INJECTABLE CONTRACEPTIVE: Primary | ICD-10-CM

## 2022-12-29 PROCEDURE — 99207 PR NO BILLABLE SERVICE THIS VISIT: CPT

## 2022-12-29 PROCEDURE — 96372 THER/PROPH/DIAG INJ SC/IM: CPT | Performed by: FAMILY MEDICINE

## 2022-12-29 RX ADMIN — MEDROXYPROGESTERONE ACETATE 150 MG: 150 INJECTION, SUSPENSION INTRAMUSCULAR at 14:50

## 2022-12-29 NOTE — NURSING NOTE
Clinic Administered Medication Documentation    Administrations This Visit     medroxyPROGESTERone (DEPO-PROVERA) injection 150 mg     Admin Date  12/29/2022 Action  Given Dose  150 mg Route  Intramuscular Site  Left Deltoid Administered By  Hernan Hollingsworth CMA    Ordering Provider: Norris Ramon MD    Patient Supplied?: No    Comments: Reminder card given to pt                  Depo Provera Documentation    URINE HCG: not indicated    Depo-Provera Standing Order inclusion/exclusion criteria reviewed.   Patient meets: inclusion criteria     BP: 147/100[pt just took her BP med 45 mins before coming- did not want to stay extra 20 mins for BP emeli- will make viv to see her PCP for BP ck[  LAST PAP/EXAM: No results found for: PAP    Prior to injection, verified patient identity using patient's name and date of birth. Medication was administered. Please see MAR and medication order for additional information.     Was entire vial of medication used? Yes  Vial/Syringe: Single dose vial  Expiration Date:  4/2024    Patient instructed to remain in clinic for 15 minutes and report any adverse reaction to staff immediately .  NEXT INJECTION DUE: 3/17/23 - 3/31/23      Name of provider who requested the medication administration: Dr. English  Name of provider on site (faculty or community preceptor) at the time of performing the medication administration: Dr. Jorge    Date of next administration: 3/17/23-3/31/23  Date of next office visit with provider to renew medication plan (must be seen annually): 4/15/23

## 2023-01-24 ENCOUNTER — OFFICE VISIT (OUTPATIENT)
Dept: FAMILY MEDICINE | Facility: CLINIC | Age: 43
End: 2023-01-24
Payer: COMMERCIAL

## 2023-01-24 VITALS
RESPIRATION RATE: 16 BRPM | DIASTOLIC BLOOD PRESSURE: 113 MMHG | TEMPERATURE: 98.4 F | HEART RATE: 88 BPM | BODY MASS INDEX: 47.09 KG/M2 | HEIGHT: 66 IN | SYSTOLIC BLOOD PRESSURE: 169 MMHG | WEIGHT: 293 LBS | OXYGEN SATURATION: 97 %

## 2023-01-24 DIAGNOSIS — Z11.59 NEED FOR HEPATITIS C SCREENING TEST: ICD-10-CM

## 2023-01-24 DIAGNOSIS — Z23 ENCOUNTER FOR VACCINATION: ICD-10-CM

## 2023-01-24 DIAGNOSIS — K21.9 GASTROESOPHAGEAL REFLUX DISEASE WITHOUT ESOPHAGITIS: ICD-10-CM

## 2023-01-24 DIAGNOSIS — M54.6 ACUTE LEFT-SIDED THORACIC BACK PAIN: ICD-10-CM

## 2023-01-24 DIAGNOSIS — E66.01 MORBID OBESITY (H): ICD-10-CM

## 2023-01-24 DIAGNOSIS — G47.9 SLEEP DIFFICULTIES: ICD-10-CM

## 2023-01-24 DIAGNOSIS — R63.2 BINGE EATING: ICD-10-CM

## 2023-01-24 DIAGNOSIS — M94.0 COSTOCHONDRITIS: Primary | ICD-10-CM

## 2023-01-24 DIAGNOSIS — I10 ESSENTIAL HYPERTENSION: ICD-10-CM

## 2023-01-24 DIAGNOSIS — I10 BENIGN ESSENTIAL HYPERTENSION: ICD-10-CM

## 2023-01-24 LAB
ANION GAP SERPL CALCULATED.3IONS-SCNC: 14 MMOL/L (ref 7–15)
BUN SERPL-MCNC: 16.4 MG/DL (ref 6–20)
CALCIUM SERPL-MCNC: 9.5 MG/DL (ref 8.6–10)
CHLORIDE SERPL-SCNC: 107 MMOL/L (ref 98–107)
CREAT SERPL-MCNC: 0.75 MG/DL (ref 0.51–0.95)
DEPRECATED HCO3 PLAS-SCNC: 23 MMOL/L (ref 22–29)
GFR SERPL CREATININE-BSD FRML MDRD: >90 ML/MIN/1.73M2
GLUCOSE SERPL-MCNC: 98 MG/DL (ref 70–99)
POTASSIUM SERPL-SCNC: 4.2 MMOL/L (ref 3.4–5.3)
SODIUM SERPL-SCNC: 144 MMOL/L (ref 136–145)

## 2023-01-24 PROCEDURE — 86803 HEPATITIS C AB TEST: CPT | Performed by: FAMILY MEDICINE

## 2023-01-24 PROCEDURE — 99214 OFFICE O/P EST MOD 30 MIN: CPT | Mod: 25 | Performed by: FAMILY MEDICINE

## 2023-01-24 PROCEDURE — 90471 IMMUNIZATION ADMIN: CPT | Performed by: FAMILY MEDICINE

## 2023-01-24 PROCEDURE — 80048 BASIC METABOLIC PNL TOTAL CA: CPT | Performed by: FAMILY MEDICINE

## 2023-01-24 PROCEDURE — 90715 TDAP VACCINE 7 YRS/> IM: CPT | Performed by: FAMILY MEDICINE

## 2023-01-24 PROCEDURE — 93000 ELECTROCARDIOGRAM COMPLETE: CPT | Performed by: FAMILY MEDICINE

## 2023-01-24 PROCEDURE — 36415 COLL VENOUS BLD VENIPUNCTURE: CPT | Performed by: FAMILY MEDICINE

## 2023-01-24 RX ORDER — LISINOPRIL 20 MG/1
20 TABLET ORAL DAILY
Qty: 90 TABLET | Refills: 3 | Status: CANCELLED | OUTPATIENT
Start: 2023-01-24

## 2023-01-24 RX ORDER — HYDROXYZINE HYDROCHLORIDE 50 MG/1
50-100 TABLET, FILM COATED ORAL
Qty: 60 TABLET | Refills: 1 | Status: SHIPPED | OUTPATIENT
Start: 2023-01-24 | End: 2024-08-13

## 2023-01-24 RX ORDER — TOPIRAMATE 50 MG/1
50 TABLET, FILM COATED ORAL 2 TIMES DAILY
Qty: 180 TABLET | Refills: 0 | Status: SHIPPED | OUTPATIENT
Start: 2023-01-24 | End: 2023-03-21

## 2023-01-24 RX ORDER — IBUPROFEN 200 MG
800 TABLET ORAL EVERY 8 HOURS PRN
Qty: 100 TABLET | Refills: 0 | Status: SHIPPED | OUTPATIENT
Start: 2023-01-24 | End: 2023-03-21

## 2023-01-24 RX ORDER — ACETAMINOPHEN 500 MG
TABLET ORAL
Qty: 100 TABLET | Refills: 0 | Status: SHIPPED | OUTPATIENT
Start: 2023-01-24 | End: 2023-03-21

## 2023-01-24 RX ORDER — LISINOPRIL 40 MG/1
40 TABLET ORAL DAILY
Qty: 90 TABLET | Refills: 3 | Status: SHIPPED | OUTPATIENT
Start: 2023-01-24 | End: 2024-01-17

## 2023-01-24 NOTE — PROGRESS NOTES
"Assessment & Plan   Atraumatic focal left chest pain, reproductive with palpation.  To be cautious, EKG obtained, which was reassuring.  Most likely costochondritis, and low concern for emergent etiologies such as MI or PE.  -- Short-term NSAID (diclofenac).    Elevated BP.  Ran out of lisinopril.  Previously 130s/80s on this.  -- Refilled lisinopril 40 mg.  -- Update BMP.  -- Home BP cuff.    Healthcare maintenance: Hep C screening.    Follow-up in March for Pap and BP recheck.  Future Appointments   Date Time Provider Department Center   3/15/2023  2:10 PM Nydia English MD [facility]   Ella Briggs is a 42 year old female with a history including obesity and HTN who presents for left chest pain.    She has pain in a focal spot of her left chest that is worse with movement, such as a deep breath or her work at a .  It also hurts to push that spot.  No preceding trauma.  No recent travel or surgeries.  No shortness of breath or sweating.  She has been out of her lisinopril.    She has a history of HTN, but blood pressures are 120s-130s/80s on lisinopril 40.  She is a former smoker.    Social: She reports that she has quit smoking. She has never used smokeless tobacco.    Objective   Vitals: BP (!) 169/113 (BP Location: Right arm, Patient Position: Sitting, Cuff Size: Adult Regular)   Pulse 88   Temp 98.4  F (36.9  C) (Oral)   Resp 16   Ht 1.664 m (5' 5.5\")   Wt (!) 158.2 kg (348 lb 12.8 oz)   SpO2 97%   BMI 57.16 kg/m    General: Pleasant. Young woman. Obese. No distress.  Heart: Regular rate and rhythm. No murmurs, rubs, or gallops.  Lungs: Clear to auscultation bilaterally. No wheezes or crackles. Good air movement.  Chest: Chest wall tenderness at left mid-axillary rib at level of lower sternum.  Psych: Appropriate grooming and hygiene. Speech normal rate. Appropriate mood and affect.    EKG (today, 1/24/23):  Normal sinus rhythm.  HR 87 bpm.  No ST segment or T wave " abnormalities.  QTc 462 ms.

## 2023-01-25 ENCOUNTER — TELEPHONE (OUTPATIENT)
Dept: FAMILY MEDICINE | Facility: CLINIC | Age: 43
End: 2023-01-25
Payer: COMMERCIAL

## 2023-01-25 DIAGNOSIS — K21.9 GASTROESOPHAGEAL REFLUX DISEASE WITHOUT ESOPHAGITIS: Primary | ICD-10-CM

## 2023-01-25 DIAGNOSIS — I10 ESSENTIAL HYPERTENSION: Primary | ICD-10-CM

## 2023-01-25 LAB — HCV AB SERPL QL IA: NONREACTIVE

## 2023-01-25 NOTE — TELEPHONE ENCOUNTER
[facility] phone call message - order or referral request for patient:     Order or referral being requested: BP cuff and meter       Additional Comments: the Dr was supposed to put in an order for this      OK to leave a message on voice mail? Yes    Primary language: English      needed? No    Call taken on January 25, 2023 at 4:14 PM by Huy Acevedo

## 2023-01-25 NOTE — TELEPHONE ENCOUNTER
Message for Prescriber    ranitidine (ZANTAC) 150 MG tablet    Message: For pt Ella Chester 1980, you ordered ranitidine 150mg, this has been pulled off the market. Please send an alternative. Thanks

## 2023-01-26 RX ORDER — FAMOTIDINE 20 MG/1
20 TABLET, FILM COATED ORAL 2 TIMES DAILY PRN
Qty: 60 TABLET | Refills: 1 | Status: SHIPPED | OUTPATIENT
Start: 2023-01-26

## 2023-01-26 NOTE — TELEPHONE ENCOUNTER
Pt is requesting BP Machine but I don't see one ordered.   Also for a meter? Not sure if its for a BG meter because I don't see a note from office visit on 01/24/23 yet.    Please advise.     ROSEANN Johnson

## 2023-02-02 NOTE — RESULT ENCOUNTER NOTE
Results for orders placed or performed in visit on 01/24/23  -Hepatitis C Screen Reflex to HCV RNA Quant and Genotype:      Status: Normal       Result                                            Value                         Ref Range                       Hepatitis C Antibody                              Nonreactive                   Nonreactive                    Narrative    Assay performance characteristics have not been established for newborns, infants, and children.  -Basic metabolic panel:      Status: Normal       Result                                            Value                         Ref Range                       Sodium                                            144                           136 - 145 mmol/L                Potassium                                         4.2                           3.4 - 5.3 mmol/L                Chloride                                          107                           98 - 107 mmol/L                 Carbon Dioxide (CO2)                              23                            22 - 29 mmol/L                  Anion Gap                                         14                            7 - 15 mmol/L                   Urea Nitrogen                                     16.4                          6.0 - 20.0 mg/dL                Creatinine                                        0.75                          0.51 - 0.95 mg/dL               Calcium                                           9.5                           8.6 - 10.0 mg/dL                Glucose                                           98                            70 - 99 mg/dL                   GFR Estimate                                      >90                           >60 mL/min/1.73m2

## 2023-02-06 DIAGNOSIS — M94.0 COSTOCHONDRITIS: ICD-10-CM

## 2023-02-21 DIAGNOSIS — M94.0 COSTOCHONDRITIS: ICD-10-CM

## 2023-03-15 ENCOUNTER — OFFICE VISIT (OUTPATIENT)
Dept: FAMILY MEDICINE | Facility: CLINIC | Age: 43
End: 2023-03-15
Payer: COMMERCIAL

## 2023-03-15 VITALS
WEIGHT: 293 LBS | HEIGHT: 66 IN | DIASTOLIC BLOOD PRESSURE: 93 MMHG | BODY MASS INDEX: 47.09 KG/M2 | RESPIRATION RATE: 22 BRPM | TEMPERATURE: 98.2 F | HEART RATE: 99 BPM | OXYGEN SATURATION: 96 % | SYSTOLIC BLOOD PRESSURE: 132 MMHG

## 2023-03-15 DIAGNOSIS — B07.9 VIRAL WARTS, UNSPECIFIED TYPE: ICD-10-CM

## 2023-03-15 DIAGNOSIS — I10 ESSENTIAL HYPERTENSION: ICD-10-CM

## 2023-03-15 DIAGNOSIS — Z30.42 ENCOUNTER FOR SURVEILLANCE OF INJECTABLE CONTRACEPTIVE: ICD-10-CM

## 2023-03-15 DIAGNOSIS — Z12.4 CERVICAL CANCER SCREENING: Primary | ICD-10-CM

## 2023-03-15 LAB
ANION GAP SERPL CALCULATED.3IONS-SCNC: 13 MMOL/L (ref 7–15)
BUN SERPL-MCNC: 14.3 MG/DL (ref 6–20)
CALCIUM SERPL-MCNC: 10.5 MG/DL (ref 8.6–10)
CHLORIDE SERPL-SCNC: 109 MMOL/L (ref 98–107)
CREAT SERPL-MCNC: 0.84 MG/DL (ref 0.51–0.95)
DEPRECATED HCO3 PLAS-SCNC: 21 MMOL/L (ref 22–29)
GFR SERPL CREATININE-BSD FRML MDRD: 89 ML/MIN/1.73M2
GLUCOSE SERPL-MCNC: 97 MG/DL (ref 70–99)
POTASSIUM SERPL-SCNC: 4.3 MMOL/L (ref 3.4–5.3)
SODIUM SERPL-SCNC: 143 MMOL/L (ref 136–145)

## 2023-03-15 PROCEDURE — G0145 SCR C/V CYTO,THINLAYER,RESCR: HCPCS | Performed by: FAMILY MEDICINE

## 2023-03-15 PROCEDURE — 80048 BASIC METABOLIC PNL TOTAL CA: CPT | Performed by: FAMILY MEDICINE

## 2023-03-15 PROCEDURE — 87624 HPV HI-RISK TYP POOLED RSLT: CPT | Performed by: FAMILY MEDICINE

## 2023-03-15 PROCEDURE — 36415 COLL VENOUS BLD VENIPUNCTURE: CPT | Performed by: FAMILY MEDICINE

## 2023-03-15 PROCEDURE — 99213 OFFICE O/P EST LOW 20 MIN: CPT | Performed by: FAMILY MEDICINE

## 2023-03-17 ENCOUNTER — APPOINTMENT (OUTPATIENT)
Dept: CT IMAGING | Facility: HOSPITAL | Age: 43
End: 2023-03-17
Attending: EMERGENCY MEDICINE
Payer: COMMERCIAL

## 2023-03-17 ENCOUNTER — HOSPITAL ENCOUNTER (EMERGENCY)
Facility: HOSPITAL | Age: 43
Discharge: HOME | End: 2023-03-17
Attending: EMERGENCY MEDICINE | Admitting: EMERGENCY MEDICINE
Payer: COMMERCIAL

## 2023-03-17 VITALS
HEART RATE: 84 BPM | OXYGEN SATURATION: 97 % | SYSTOLIC BLOOD PRESSURE: 158 MMHG | DIASTOLIC BLOOD PRESSURE: 83 MMHG | RESPIRATION RATE: 22 BRPM | WEIGHT: 293 LBS | TEMPERATURE: 97.8 F | BODY MASS INDEX: 55.68 KG/M2

## 2023-03-17 DIAGNOSIS — R07.9 CHEST PAIN, UNSPECIFIED TYPE: ICD-10-CM

## 2023-03-17 LAB
ANION GAP SERPL CALCULATED.3IONS-SCNC: 9 MMOL/L (ref 7–15)
BASOPHILS # BLD AUTO: 0.1 10E3/UL (ref 0–0.2)
BASOPHILS NFR BLD AUTO: 1 %
BKR LAB AP GYN ADEQUACY: NORMAL
BKR LAB AP GYN INTERPRETATION: NORMAL
BKR LAB AP HPV REFLEX: NORMAL
BKR LAB AP PREVIOUS ABNORMAL: NORMAL
BUN SERPL-MCNC: 10.1 MG/DL (ref 6–20)
CALCIUM SERPL-MCNC: 9 MG/DL (ref 8.6–10)
CHLORIDE SERPL-SCNC: 107 MMOL/L (ref 98–107)
CREAT SERPL-MCNC: 0.81 MG/DL (ref 0.51–0.95)
DEPRECATED HCO3 PLAS-SCNC: 26 MMOL/L (ref 22–29)
EOSINOPHIL # BLD AUTO: 0.2 10E3/UL (ref 0–0.7)
EOSINOPHIL NFR BLD AUTO: 2 %
ERYTHROCYTE [DISTWIDTH] IN BLOOD BY AUTOMATED COUNT: 12.5 % (ref 10–15)
GFR SERPL CREATININE-BSD FRML MDRD: >90 ML/MIN/1.73M2
GLUCOSE SERPL-MCNC: 104 MG/DL (ref 70–99)
HCG SERPL QL: NEGATIVE
HCT VFR BLD AUTO: 44.2 % (ref 35–47)
HGB BLD-MCNC: 14.4 G/DL (ref 11.7–15.7)
HOLD SPECIMEN: NORMAL
IMM GRANULOCYTES # BLD: 0.1 10E3/UL
IMM GRANULOCYTES NFR BLD: 1 %
LYMPHOCYTES # BLD AUTO: 2.5 10E3/UL (ref 0.8–5.3)
LYMPHOCYTES NFR BLD AUTO: 22 %
MCH RBC QN AUTO: 31.4 PG (ref 26.5–33)
MCHC RBC AUTO-ENTMCNC: 32.6 G/DL (ref 31.5–36.5)
MCV RBC AUTO: 96 FL (ref 78–100)
MONOCYTES # BLD AUTO: 0.7 10E3/UL (ref 0–1.3)
MONOCYTES NFR BLD AUTO: 6 %
NEUTROPHILS # BLD AUTO: 7.8 10E3/UL (ref 1.6–8.3)
NEUTROPHILS NFR BLD AUTO: 68 %
NRBC # BLD AUTO: 0 10E3/UL
NRBC BLD AUTO-RTO: 0 /100
PATH REPORT.COMMENTS IMP SPEC: NORMAL
PATH REPORT.COMMENTS IMP SPEC: NORMAL
PATH REPORT.RELEVANT HX SPEC: NORMAL
PLATELET # BLD AUTO: 290 10E3/UL (ref 150–450)
POTASSIUM SERPL-SCNC: 3.8 MMOL/L (ref 3.4–5.3)
RBC # BLD AUTO: 4.59 10E6/UL (ref 3.8–5.2)
SODIUM SERPL-SCNC: 142 MMOL/L (ref 136–145)
TROPONIN T SERPL HS-MCNC: <6 NG/L
WBC # BLD AUTO: 11.2 10E3/UL (ref 4–11)

## 2023-03-17 PROCEDURE — 99285 EMERGENCY DEPT VISIT HI MDM: CPT | Mod: 25

## 2023-03-17 PROCEDURE — 36415 COLL VENOUS BLD VENIPUNCTURE: CPT | Performed by: EMERGENCY MEDICINE

## 2023-03-17 PROCEDURE — 93005 ELECTROCARDIOGRAM TRACING: CPT | Performed by: STUDENT IN AN ORGANIZED HEALTH CARE EDUCATION/TRAINING PROGRAM

## 2023-03-17 PROCEDURE — 84484 ASSAY OF TROPONIN QUANT: CPT | Performed by: EMERGENCY MEDICINE

## 2023-03-17 PROCEDURE — 250N000011 HC RX IP 250 OP 636: Performed by: EMERGENCY MEDICINE

## 2023-03-17 PROCEDURE — 85025 COMPLETE CBC W/AUTO DIFF WBC: CPT | Performed by: EMERGENCY MEDICINE

## 2023-03-17 PROCEDURE — 250N000013 HC RX MED GY IP 250 OP 250 PS 637: Performed by: EMERGENCY MEDICINE

## 2023-03-17 PROCEDURE — 71275 CT ANGIOGRAPHY CHEST: CPT

## 2023-03-17 PROCEDURE — 80048 BASIC METABOLIC PNL TOTAL CA: CPT | Performed by: EMERGENCY MEDICINE

## 2023-03-17 PROCEDURE — 84703 CHORIONIC GONADOTROPIN ASSAY: CPT | Performed by: EMERGENCY MEDICINE

## 2023-03-17 PROCEDURE — 93005 ELECTROCARDIOGRAM TRACING: CPT | Performed by: EMERGENCY MEDICINE

## 2023-03-17 PROCEDURE — 96374 THER/PROPH/DIAG INJ IV PUSH: CPT

## 2023-03-17 RX ORDER — LIDOCAINE 50 MG/G
1 PATCH TOPICAL EVERY 24 HOURS
Qty: 10 PATCH | Refills: 0 | Status: SHIPPED | OUTPATIENT
Start: 2023-03-17 | End: 2023-03-27

## 2023-03-17 RX ORDER — ASPIRIN 81 MG/1
324 TABLET, CHEWABLE ORAL ONCE
Status: COMPLETED | OUTPATIENT
Start: 2023-03-17 | End: 2023-03-17

## 2023-03-17 RX ORDER — IOPAMIDOL 755 MG/ML
100 INJECTION, SOLUTION INTRAVASCULAR ONCE
Status: COMPLETED | OUTPATIENT
Start: 2023-03-17 | End: 2023-03-17

## 2023-03-17 RX ORDER — KETOROLAC TROMETHAMINE 15 MG/ML
15 INJECTION, SOLUTION INTRAMUSCULAR; INTRAVENOUS ONCE
Status: COMPLETED | OUTPATIENT
Start: 2023-03-17 | End: 2023-03-17

## 2023-03-17 RX ADMIN — KETOROLAC TROMETHAMINE 15 MG: 15 INJECTION, SOLUTION INTRAMUSCULAR; INTRAVENOUS at 15:02

## 2023-03-17 RX ADMIN — ASPIRIN 324 MG: 81 TABLET, CHEWABLE ORAL at 15:01

## 2023-03-17 RX ADMIN — IOPAMIDOL 100 ML: 755 INJECTION, SOLUTION INTRAVENOUS at 16:28

## 2023-03-17 NOTE — DISCHARGE INSTRUCTIONS
CT scan is negative for blood clot in the lungs.  As per the initial doctor, this is likely musculoskeletal and you can try the Lidoderm patches that are prescribed.

## 2023-03-17 NOTE — ED NOTES
EMERGENCY DEPARTMENT SIGN OUT NOTE        ED COURSE AND MEDICAL DECISION MAKING  Patient was signed out to me by Dr Jesse Salinas at 3:57 PM.  4:46 PM I updated patient on results and discussed discharge.    In brief, Ella Briggs is a 42 year old female who initially presented with chest pain. Patient reports left-sided chest pain for ~2 weeks that initially began ~2 months ago. Pain initially resolved after her PCP prescribed NSAIDS.     At time of sign out, disposition was pending CT chest pulmonary embolism w/ contrast.    Update: CT negative for pulmonary embolus or any other abnormality.  Patient's vital signs remain reassuring with no hypoxia or tachycardia, she has no increased work of breathing, and chest pain does sound atypical.  I am in agreement with Dr. Combs and will discharge with the Lidoderm patch as he prescribed and return precautions.    FINAL IMPRESSION    1. Chest pain, unspecified type        ED MEDS  Medications   aspirin (ASA) chewable tablet 324 mg (324 mg Oral $Given 3/17/23 1501)   ketorolac (TORADOL) injection 15 mg (15 mg Intravenous $Given 3/17/23 1502)   iopamidol (ISOVUE-370) solution 100 mL (100 mLs Intravenous $Given 3/17/23 1628)       LAB  Labs Ordered and Resulted from Time of ED Arrival to Time of ED Departure   BASIC METABOLIC PANEL - Abnormal       Result Value    Sodium 142      Potassium 3.8      Chloride 107      Carbon Dioxide (CO2) 26      Anion Gap 9      Urea Nitrogen 10.1      Creatinine 0.81      Calcium 9.0      Glucose 104 (*)     GFR Estimate >90     CBC WITH PLATELETS AND DIFFERENTIAL - Abnormal    WBC Count 11.2 (*)     RBC Count 4.59      Hemoglobin 14.4      Hematocrit 44.2      MCV 96      MCH 31.4      MCHC 32.6      RDW 12.5      Platelet Count 290      % Neutrophils 68      % Lymphocytes 22      % Monocytes 6      % Eosinophils 2      % Basophils 1      % Immature Granulocytes 1      NRBCs per 100 WBC 0      Absolute Neutrophils 7.8       Absolute Lymphocytes 2.5      Absolute Monocytes 0.7      Absolute Eosinophils 0.2      Absolute Basophils 0.1      Absolute Immature Granulocytes 0.1      Absolute NRBCs 0.0     TROPONIN T, HIGH SENSITIVITY - Normal    Troponin T, High Sensitivity <6     HCG QUALITATIVE PREGNANCY - Normal    hCG Serum Qualitative Negative         EKG      RADIOLOGY    CT Chest Pulmonary Embolism w Contrast   Preliminary Result   IMPRESSION:   1.  No pulmonary embolism.       2.  Mosaic attenuation can be seen with air trapping.      3.  Fatty liver.          DISCHARGE MEDS  New Prescriptions    LIDOCAINE (LIDODERM) 5 % PATCH    Place 1 patch onto the skin every 24 hours for 10 days       I, Antionette Maradiaga, am serving as a scribe to document services personally performed by Dr. Vega based on my observation and the provider's statements to me. I, Emeli Vega MD attest that Antionette Maradiaga is acting in a scribe capacity, has observed my performance of the services and has documented them in accordance with my direction.   Emeli Vega M.D.   Emergency Medicine   [facility] EMERGENCY DEPARTMENT  [facility] [facility] [id number] [phone]  Dept: [phone]      Emeli Vega MD  03/17/23 6776

## 2023-03-17 NOTE — ED TRIAGE NOTES
Patient presents here for evaluation of chest pain that has occurred over the past few months. She was seen by her primary provider who told her that she thought that it was fatty tissue getting caught in her rib cage. The pain has been occurring over the past few months. The pain became exponentially worse today, which lead her to come here. Movement of her upper body increases her pain.
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
## 2023-03-18 LAB
ATRIAL RATE - MUSE: 86 BPM
DIASTOLIC BLOOD PRESSURE - MUSE: NORMAL MMHG
INTERPRETATION ECG - MUSE: NORMAL
P AXIS - MUSE: 33 DEGREES
PR INTERVAL - MUSE: 144 MS
QRS DURATION - MUSE: 80 MS
QT - MUSE: 386 MS
QTC - MUSE: 461 MS
R AXIS - MUSE: 55 DEGREES
SYSTOLIC BLOOD PRESSURE - MUSE: NORMAL MMHG
T AXIS - MUSE: 32 DEGREES
VENTRICULAR RATE- MUSE: 86 BPM

## 2023-03-20 ENCOUNTER — TELEPHONE (OUTPATIENT)
Dept: FAMILY MEDICINE | Facility: CLINIC | Age: 43
End: 2023-03-20

## 2023-03-20 RX ORDER — IMIQUIMOD 12.5 MG/.25G
CREAM TOPICAL
Qty: 12 PACKET | Refills: 3 | Status: SHIPPED | OUTPATIENT
Start: 2023-03-20

## 2023-03-20 NOTE — TELEPHONE ENCOUNTER
[facility] phone call message- general phone call:    Reason for call: the Pt called to ask for the Dr to call her. She was seen in the ER and wants help with a medication given to her their     Return call needed: Yes    OK to leave a message on voice mail? Yes    Primary language: English      needed? No    Call taken on March 20, 2023 at 9:29 AM by Huy Acevedo

## 2023-03-20 NOTE — PROGRESS NOTES
"Assessment & Plan   Elevated BP, improved since restarting lisinopril 40.   Baseline Cr 0.75 and K+ 4.2.  -- Recheck BMP.   If appropriate (Cr increase <30% and normal K+), then plan for annual BMP.    Healthcare maintenance: Pap completed today.  -- Verruca.  Ordered imiquimod.    Due for upcoming Depo shot.  BP better controlled.  Approved for another year.      Follow-up in March for Pap and BP recheck.  Future Appointments   Date Time Provider Department Center   3/15/2023  2:10 PM Nydia English MD [facility]   Ella Briggs is a 42 year old female with a history including obesity and HTN who presents for Pap smear and BP follow-up.    At t he last visit, she had run out of lisinopril, and her pressures were 147-169/100-113.  A baseline BMP was checked, and she was restarted on lisinopril 40.  Today, her BP is improved (132/93).  There was an issue with sending the BP cuff order, so she hasn't received that yet.    Social: She reports that she has quit smoking. She has never used smokeless tobacco.    Objective   Vitals: BP (!) 132/93   Pulse 99   Temp 98.2  F (36.8  C) (Tympanic)   Resp 22   Ht 1.676 m (5' 6\")   Wt (!) 156.5 kg (345 lb)   SpO2 96%   BMI 55.68 kg/m    General: Pleasant. Young woman. Obese. No distress.  Heart: Regular rate and rhythm. No murmurs, rubs, or gallops.  Lungs: Clear to auscultation bilaterally. No wheezes or crackles. Good air movement.  : Some skin tags on the proximal medial thighs and verruca on the external labia.  No vaginal discharge.  Cervix appears normal.  Psych: Appropriate grooming and hygiene. Speech normal rate. Appropriate mood and affect.    Labs reviewed  Component      Latest Ref Rng & Units 1/24/2023   Sodium      136 - 145 mmol/L 144   Potassium      3.4 - 5.3 mmol/L 4.2   Chloride      98 - 107 mmol/L 107   Carbon Dioxide (CO2)      22 - 29 mmol/L 23   Anion Gap      7 - 15 mmol/L 14   Urea Nitrogen      6.0 - 20.0 mg/dL 16.4 "   Creatinine      0.51 - 0.95 mg/dL 0.75   Calcium      8.6 - 10.0 mg/dL 9.5   Glucose      70 - 99 mg/dL 98   GFR Estimate      >60 mL/min/1.73m2 >90

## 2023-03-21 ENCOUNTER — MYC REFILL (OUTPATIENT)
Dept: FAMILY MEDICINE | Facility: CLINIC | Age: 43
End: 2023-03-21
Payer: COMMERCIAL

## 2023-03-21 DIAGNOSIS — M94.0 COSTOCHONDRITIS: ICD-10-CM

## 2023-03-21 DIAGNOSIS — R63.2 BINGE EATING: ICD-10-CM

## 2023-03-21 LAB
HUMAN PAPILLOMA VIRUS 16 DNA: NEGATIVE
HUMAN PAPILLOMA VIRUS 18 DNA: NEGATIVE
HUMAN PAPILLOMA VIRUS FINAL DIAGNOSIS: NORMAL
HUMAN PAPILLOMA VIRUS OTHER HR: NEGATIVE

## 2023-03-21 RX ORDER — ACETAMINOPHEN 500 MG
TABLET ORAL
Qty: 100 TABLET | Refills: 0 | Status: SHIPPED | OUTPATIENT
Start: 2023-03-21 | End: 2023-07-20

## 2023-03-21 RX ORDER — TOPIRAMATE 50 MG/1
50 TABLET, FILM COATED ORAL 2 TIMES DAILY
Qty: 180 TABLET | Refills: 0 | Status: SHIPPED | OUTPATIENT
Start: 2023-03-21 | End: 2023-04-25

## 2023-03-21 RX ORDER — IBUPROFEN 200 MG
800 TABLET ORAL EVERY 8 HOURS PRN
Qty: 100 TABLET | Refills: 0 | Status: SHIPPED | OUTPATIENT
Start: 2023-03-21 | End: 2023-07-20

## 2023-03-22 ENCOUNTER — ALLIED HEALTH/NURSE VISIT (OUTPATIENT)
Dept: FAMILY MEDICINE | Facility: CLINIC | Age: 43
End: 2023-03-22
Payer: COMMERCIAL

## 2023-03-22 VITALS
DIASTOLIC BLOOD PRESSURE: 88 MMHG | HEART RATE: 101 BPM | TEMPERATURE: 98.1 F | RESPIRATION RATE: 20 BRPM | OXYGEN SATURATION: 97 % | SYSTOLIC BLOOD PRESSURE: 136 MMHG

## 2023-03-22 DIAGNOSIS — Z78.9 USES BIRTH CONTROL: Primary | ICD-10-CM

## 2023-03-22 PROCEDURE — 96372 THER/PROPH/DIAG INJ SC/IM: CPT | Performed by: FAMILY MEDICINE

## 2023-03-22 RX ORDER — MEDROXYPROGESTERONE ACETATE 150 MG/ML
150 INJECTION, SUSPENSION INTRAMUSCULAR
Status: ACTIVE | OUTPATIENT
Start: 2023-03-22 | End: 2024-03-16

## 2023-03-22 RX ADMIN — MEDROXYPROGESTERONE ACETATE 150 MG: 150 INJECTION, SUSPENSION INTRAMUSCULAR at 14:13

## 2023-03-22 NOTE — NURSING NOTE
Clinic Administered Medication Documentation        Patient was given depo. Prior to medication administration, verified patient's identity using patient s name and date of birth. Please see MAR and medication order for additional information. Patient instructed to remain in clinic for 15 minutes and report any adverse reaction to staff immediately.    Vial/Syringe: Single dose vial. Was entire vial of medication used? Yes    Name of provider who requested the medication administration: Dr. English  Name of provider on site (faculty or community preceptor) at the time of performing the medication administration: Dr. Mckinley    Date of next administration: 06/08/2023 to 06/22/2023  Date of next office visit with provider to renew medication plan (must be seen annually): 04/15/2023

## 2023-03-22 NOTE — Clinical Note
Pt was in today for depo shot.  Notify patient her annually date 04/15/2023.  For her new depo visit she have to come see   Not nurse visit.  Patient stated that she came see you on 03/15/2023 and you told her you will renew her depo cam for the full year.  Please advise.  And order new depo cam.  Fede Cam MA

## 2023-04-07 ENCOUNTER — OFFICE VISIT (OUTPATIENT)
Dept: FAMILY MEDICINE | Facility: CLINIC | Age: 43
End: 2023-04-07
Payer: COMMERCIAL

## 2023-04-07 VITALS
DIASTOLIC BLOOD PRESSURE: 104 MMHG | BODY MASS INDEX: 55.68 KG/M2 | TEMPERATURE: 98.3 F | HEART RATE: 101 BPM | HEIGHT: 66 IN | OXYGEN SATURATION: 96 % | SYSTOLIC BLOOD PRESSURE: 155 MMHG | RESPIRATION RATE: 18 BRPM

## 2023-04-07 DIAGNOSIS — R07.89 ATYPICAL CHEST PAIN: Primary | ICD-10-CM

## 2023-04-07 PROCEDURE — 99214 OFFICE O/P EST MOD 30 MIN: CPT | Performed by: FAMILY MEDICINE

## 2023-04-07 RX ORDER — INDOMETHACIN 50 MG/1
50 CAPSULE ORAL 2 TIMES DAILY WITH MEALS
Qty: 10 CAPSULE | Refills: 0 | Status: SHIPPED | OUTPATIENT
Start: 2023-04-07 | End: 2023-04-10

## 2023-04-07 RX ORDER — KETOROLAC TROMETHAMINE 10 MG/1
10 TABLET, FILM COATED ORAL 2 TIMES DAILY PRN
Qty: 10 TABLET | Refills: 0 | Status: SHIPPED | OUTPATIENT
Start: 2023-04-07 | End: 2024-03-19

## 2023-04-09 DIAGNOSIS — R07.89 ATYPICAL CHEST PAIN: ICD-10-CM

## 2023-04-10 RX ORDER — INDOMETHACIN 50 MG/1
CAPSULE ORAL
Qty: 10 CAPSULE | Refills: 0 | Status: SHIPPED | OUTPATIENT
Start: 2023-04-10

## 2023-04-16 ENCOUNTER — HEALTH MAINTENANCE LETTER (OUTPATIENT)
Age: 43
End: 2023-04-16

## 2023-04-17 NOTE — PROGRESS NOTES
"Assessment & Plan   Follow-up emergency department visit for chest pain, with reassuring lab work-up, CT PE scan, and EKG.  The pain is worse with palpation otherwise unrelated to food or activity.  We will treat this as a costochondritis/pleuritis type picture.  -- Ordered a limited course of indomethacin right now.  -- For severe flares, ketorolac as needed.  Hopefully this will reduce the need to seek emergent care.    Contraceptive management.  Renewed Depo.  -- Medroxyprogesterone 150 mg every 90 days for 1 year.    Follow-up as needed.    Subjective   Ella Briggs is a 42 year old female with a history including obesity and HTN who presents for ED follow-up.    She went to [facility] Emergency department on 3/17/2023 for chest pain.  She did not have evidence for ACS, and troponin was negative and EKG reassuring.  BMP was normal, and CBC differential showed a very mild leukocytosis of 11.2.  CT PE scan showed no pulmonary embolism and some ascitic attenuation that could be consistent with air trapping.  She was also noted to have fatty liver.    Today, she says that she continues to have the pain located within her left chest wall, not in her breast.  It is not related to food or activity.  It is tender to press on.    Social: She reports that she has quit smoking. She has never used smokeless tobacco.    Objective   Vitals: BP (!) 155/104   Pulse 101   Temp 98.3  F (36.8  C) (Oral)   Resp 18   Ht 1.676 m (5' 6\")   SpO2 96%   BMI 55.68 kg/m    General: Pleasant. Young woman. Obese. No distress.  Heart: Regular rate and rhythm. No murmurs, rubs, or gallops.  Lungs: Clear to auscultation bilaterally. No wheezes or crackles. Good air movement.  Psych: Appropriate grooming and hygiene. Speech normal rate. Appropriate mood and affect.    Diagnostics reviewed  Component      Latest Ref Rng 3/17/2023  2:56 PM   WBC      4.0 - 11.0 10e3/uL 11.2 (H)    RBC Count      3.80 - 5.20 10e6/uL 4.59    Hemoglobin    "   11.7 - 15.7 g/dL 14.4    Hematocrit      35.0 - 47.0 % 44.2    MCV      78 - 100 fL 96    MCH      26.5 - 33.0 pg 31.4    MCHC      31.5 - 36.5 g/dL 32.6    RDW      10.0 - 15.0 % 12.5    Platelet Count      150 - 450 10e3/uL 290    % Neutrophils      % 68    % Lymphocytes      % 22    % Monocytes      % 6    % Eosinophils      % 2    % Basophils      % 1    % Immature Granulocytes      % 1    NRBCs per 100 WBC      <1 /100 0    Absolute Neutrophils      1.6 - 8.3 10e3/uL 7.8    Absolute Lymphocytes      0.8 - 5.3 10e3/uL 2.5    Absolute Monocytes      0.0 - 1.3 10e3/uL 0.7    Absolute Eosinophils      0.0 - 0.7 10e3/uL 0.2    Absolute Basophils      0.0 - 0.2 10e3/uL 0.1    Absolute Immature Granulocytes      <=0.4 10e3/uL 0.1    Absolute NRBCs      10e3/uL 0.0    Sodium      136 - 145 mmol/L 142    Potassium      3.4 - 5.3 mmol/L 3.8    Chloride      98 - 107 mmol/L 107    Carbon Dioxide (CO2)      22 - 29 mmol/L 26    Anion Gap      7 - 15 mmol/L 9    Urea Nitrogen      6.0 - 20.0 mg/dL 10.1    Creatinine      0.51 - 0.95 mg/dL 0.81    Calcium      8.6 - 10.0 mg/dL 9.0    Glucose      70 - 99 mg/dL 104 (H)    GFR Estimate      >60 mL/min/1.73m2 >90    Troponin T, High Sensitivity      <=14 ng/L <6    HCG Qualitative Serum      Negative  Negative       Legend:  (H) High      EXAM: CT CHEST PULMONARY EMBOLISM W CONTRAST  LOCATION: [facility]  DATE/TIME: 3/17/2023 4:29 PM     INDICATION: Chest pain.  COMPARISON: None.  TECHNIQUE: CT chest pulmonary angiogram during arterial phase injection of IV contrast. Multiplanar reformats and MIP reconstructions were performed. Dose reduction techniques were used.   CONTRAST: isovue 370 100ml     FINDINGS:  ANGIOGRAM CHEST: Pulmonary arteries are normal caliber and negative for pulmonary emboli. Thoracic aorta is negative for dissection. No CT evidence of right heart strain.     LUNGS AND PLEURA: Mosaic attenuation.     MEDIASTINUM/AXILLAE:  Normal.     CORONARY ARTERY CALCIFICATION: None.     UPPER ABDOMEN: Fatty liver.     MUSCULOSKELETAL: Normal.                                                                      IMPRESSION:  1.  No pulmonary embolism.      2.  Mosaic attenuation can be seen with air trapping.     3.  Fatty liver.

## 2023-04-22 DIAGNOSIS — R63.2 BINGE EATING: ICD-10-CM

## 2023-04-25 RX ORDER — TOPIRAMATE 50 MG/1
TABLET, FILM COATED ORAL
Qty: 180 TABLET | Refills: 0 | Status: SHIPPED | OUTPATIENT
Start: 2023-04-25 | End: 2023-07-20

## 2023-06-14 ENCOUNTER — OFFICE VISIT (OUTPATIENT)
Dept: FAMILY MEDICINE | Facility: CLINIC | Age: 43
End: 2023-06-14
Payer: COMMERCIAL

## 2023-06-14 DIAGNOSIS — Z78.9 USES BIRTH CONTROL: Primary | ICD-10-CM

## 2023-06-14 PROCEDURE — 96372 THER/PROPH/DIAG INJ SC/IM: CPT | Performed by: FAMILY MEDICINE

## 2023-06-14 PROCEDURE — 99207 PR NO BILLABLE SERVICE THIS VISIT: CPT

## 2023-06-14 RX ADMIN — MEDROXYPROGESTERONE ACETATE 150 MG: 150 INJECTION, SUSPENSION INTRAMUSCULAR at 14:54

## 2023-06-14 NOTE — PROGRESS NOTES
Clinic Administered Medication Documentation      Depo Provera Documentation    Depo-Provera Standing Order inclusion/exclusion criteria reviewed.     Is this the initial or subsequent dose of Depo Provera? Subsequent dose - patient is within the acceptable window of time (11-15 weeks) for subsequent injection. Pregnancy test not indicated.    Patient meets: inclusion criteria     Is there an active order (written within the past 365 days, with administrations remaining, not ) in the chart? Yes.     Prior to injection, verified patient identity using patient's name and date of birth. Medication was administered. Please see MAR and medication order for additional information.     Vial/Syringe: Single dose vial. Was entire vial of medication used? Yes    Patient instructed to remain in clinic for 15 minutes.  NEXT INJECTION DUE: 23 - 23    Verified that the patient has refills remaining in their prescription.        Name of provider who requested the medication administration:   Name of provider on site (faculty or community preceptor) at the time of performing the medication administration: Dr. Obregon    Date of next administration: aug 31,23- .  Date of next office visit with provider to renew medication plan (must be seen annually): 2024    NEXT INJECTION DUE: Aug 31, 2023- Sep 14,2023/     Renewed depo with Dr. English on 2023.

## 2023-07-20 ENCOUNTER — MYC REFILL (OUTPATIENT)
Dept: FAMILY MEDICINE | Facility: CLINIC | Age: 43
End: 2023-07-20
Payer: COMMERCIAL

## 2023-07-20 DIAGNOSIS — M94.0 COSTOCHONDRITIS: ICD-10-CM

## 2023-07-20 DIAGNOSIS — R63.2 BINGE EATING: ICD-10-CM

## 2023-07-21 RX ORDER — IBUPROFEN 200 MG
800 TABLET ORAL EVERY 8 HOURS PRN
Qty: 100 TABLET | Refills: 0 | Status: SHIPPED | OUTPATIENT
Start: 2023-07-21 | End: 2023-09-14

## 2023-07-21 RX ORDER — TOPIRAMATE 50 MG/1
50 TABLET, FILM COATED ORAL 2 TIMES DAILY
Qty: 180 TABLET | Refills: 0 | Status: SHIPPED | OUTPATIENT
Start: 2023-07-21 | End: 2023-11-29

## 2023-07-21 RX ORDER — ACETAMINOPHEN 500 MG
TABLET ORAL
Qty: 100 TABLET | Refills: 0 | Status: SHIPPED | OUTPATIENT
Start: 2023-07-21 | End: 2023-09-14

## 2023-09-12 ENCOUNTER — ALLIED HEALTH/NURSE VISIT (OUTPATIENT)
Dept: FAMILY MEDICINE | Facility: CLINIC | Age: 43
End: 2023-09-12
Payer: COMMERCIAL

## 2023-09-12 VITALS
DIASTOLIC BLOOD PRESSURE: 85 MMHG | OXYGEN SATURATION: 98 % | TEMPERATURE: 98.4 F | RESPIRATION RATE: 16 BRPM | HEART RATE: 86 BPM | SYSTOLIC BLOOD PRESSURE: 159 MMHG

## 2023-09-12 DIAGNOSIS — Z30.42 ENCOUNTER FOR SURVEILLANCE OF INJECTABLE CONTRACEPTIVE: Primary | ICD-10-CM

## 2023-09-12 PROCEDURE — 96372 THER/PROPH/DIAG INJ SC/IM: CPT | Performed by: FAMILY MEDICINE

## 2023-09-12 RX ADMIN — MEDROXYPROGESTERONE ACETATE 150 MG: 150 INJECTION, SUSPENSION INTRAMUSCULAR at 15:11

## 2023-09-12 NOTE — NURSING NOTE
Clinic Administered Medication Documentation      Depo Provera Documentation    Depo-Provera Standing Order inclusion/exclusion criteria reviewed.     Is this the initial or subsequent dose of Depo Provera? Subsequent dose - patient is within the acceptable window of time (11-15 weeks) for subsequent injection. Pregnancy test not indicated.    Patient meets: inclusion criteria     Is there an active order (written within the past 365 days, with administrations remaining, not ) in the chart? Yes.     Prior to injection, verified patient identity using patient's name and date of birth. Medication was administered. Please see MAR and medication order for additional information.     Vial/Syringe: Single dose vial. Was entire vial of medication used? Yes    Patient instructed to remain in clinic for 15 minutes and report any adverse reaction to staff immediately.  NEXT INJECTION DUE: 23 - 23    Verified that the patient has refills remaining in their prescription.    Name of provider who requested the medication administration: Dr. english  Name of provider on site (faculty or community preceptor) at the time of performing the medication administration: Dr. English    Date of next administration: 23 - 2023  Date of next office visit with provider to renew medication plan (must be seen annually): 03/15/2024

## 2023-09-14 ENCOUNTER — MYC REFILL (OUTPATIENT)
Dept: FAMILY MEDICINE | Facility: CLINIC | Age: 43
End: 2023-09-14
Payer: COMMERCIAL

## 2023-09-14 DIAGNOSIS — M94.0 COSTOCHONDRITIS: ICD-10-CM

## 2023-09-14 RX ORDER — IBUPROFEN 200 MG
800 TABLET ORAL EVERY 8 HOURS PRN
Qty: 100 TABLET | Refills: 0 | Status: SHIPPED | OUTPATIENT
Start: 2023-09-14 | End: 2023-11-29

## 2023-09-14 RX ORDER — IBUPROFEN 200 MG
800 TABLET ORAL EVERY 8 HOURS PRN
Qty: 100 TABLET | Refills: 0 | Status: SHIPPED | OUTPATIENT
Start: 2023-09-14 | End: 2023-09-14

## 2023-09-14 RX ORDER — ACETAMINOPHEN 500 MG
TABLET ORAL
Qty: 100 TABLET | Refills: 0 | Status: SHIPPED | OUTPATIENT
Start: 2023-09-14 | End: 2023-11-29

## 2023-09-14 RX ORDER — ACETAMINOPHEN 500 MG
TABLET ORAL
Qty: 100 TABLET | Refills: 0 | Status: SHIPPED | OUTPATIENT
Start: 2023-09-14 | End: 2023-09-14

## 2023-11-29 ENCOUNTER — MYC REFILL (OUTPATIENT)
Dept: FAMILY MEDICINE | Facility: CLINIC | Age: 43
End: 2023-11-29
Payer: COMMERCIAL

## 2023-11-29 DIAGNOSIS — M94.0 COSTOCHONDRITIS: ICD-10-CM

## 2023-11-29 DIAGNOSIS — R63.2 BINGE EATING: ICD-10-CM

## 2023-11-30 RX ORDER — TOPIRAMATE 50 MG/1
50 TABLET, FILM COATED ORAL 2 TIMES DAILY
Qty: 180 TABLET | Refills: 0 | Status: SHIPPED | OUTPATIENT
Start: 2023-11-30 | End: 2024-03-20

## 2023-11-30 RX ORDER — ACETAMINOPHEN 500 MG
TABLET ORAL
Qty: 100 TABLET | Refills: 0 | Status: SHIPPED | OUTPATIENT
Start: 2023-11-30 | End: 2024-01-31

## 2023-11-30 RX ORDER — IBUPROFEN 200 MG
800 TABLET ORAL EVERY 8 HOURS PRN
Qty: 100 TABLET | Refills: 0 | Status: SHIPPED | OUTPATIENT
Start: 2023-11-30 | End: 2024-01-31

## 2023-12-11 ENCOUNTER — ALLIED HEALTH/NURSE VISIT (OUTPATIENT)
Dept: FAMILY MEDICINE | Facility: CLINIC | Age: 43
End: 2023-12-11
Payer: COMMERCIAL

## 2023-12-11 VITALS
DIASTOLIC BLOOD PRESSURE: 93 MMHG | HEART RATE: 96 BPM | RESPIRATION RATE: 20 BRPM | SYSTOLIC BLOOD PRESSURE: 151 MMHG | TEMPERATURE: 98.4 F | OXYGEN SATURATION: 96 %

## 2023-12-11 DIAGNOSIS — Z30.42 ENCOUNTER FOR SURVEILLANCE OF INJECTABLE CONTRACEPTIVE: Primary | ICD-10-CM

## 2023-12-11 PROCEDURE — 96372 THER/PROPH/DIAG INJ SC/IM: CPT | Performed by: FAMILY MEDICINE

## 2023-12-11 RX ADMIN — MEDROXYPROGESTERONE ACETATE 150 MG: 150 INJECTION, SUSPENSION INTRAMUSCULAR at 14:08

## 2023-12-11 NOTE — PROGRESS NOTES
Clinic Administered Medication Documentation      Depo Provera Documentation    Depo-Provera Standing Order inclusion/exclusion criteria reviewed.     Is this the initial or subsequent dose of Depo Provera? Subsequent dose - patient is within the acceptable window of time (11-15 weeks) for subsequent injection. Pregnancy test not indicated.    Patient meets: inclusion criteria     Is there an active order (written within the past 365 days, with administrations remaining, not ) in the chart? Yes.     Prior to injection, verified patient identity using patient's name and date of birth. Medication was administered. Please see MAR and medication order for additional information.     Vial/Syringe: Single dose vial. Was entire vial of medication used? Yes    Patient instructed to remain in clinic for 15 minutes and report any adverse reaction to staff immediately.  NEXT INJECTION DUE: 24 - 3/25/24 - NEEDS TO SEE DOCTOR TO RENEW!!     Patient has no refills remaining. Refill encounter opened, order pended and Routed to the provider

## 2024-01-16 DIAGNOSIS — I10 BENIGN ESSENTIAL HYPERTENSION: ICD-10-CM

## 2024-01-17 RX ORDER — LISINOPRIL 40 MG/1
40 TABLET ORAL DAILY
Qty: 90 TABLET | Refills: 3 | Status: SHIPPED | OUTPATIENT
Start: 2024-01-17

## 2024-01-17 NOTE — TELEPHONE ENCOUNTER
Medication requested: LISINOPRIL 40MG TABLETS   Last office visit: 6/14/23  Future [facility] appointments: none  Medication last refilled: 10/18/23  Last qualifying labs: BP: 151/93 Abnormal   as of 12/11/2023     Routing refill request to provider for review/approval because:      ACE Inhibitors (Including Combos) Protocol [id number]/16/2024 05:34 AM   Protocol Details Blood pressure under 140/90 in past 12 months     DEEPIKA Diaz, BSN

## 2024-01-31 ENCOUNTER — OFFICE VISIT (OUTPATIENT)
Dept: FAMILY MEDICINE | Facility: CLINIC | Age: 44
End: 2024-01-31
Payer: COMMERCIAL

## 2024-01-31 VITALS
WEIGHT: 293 LBS | TEMPERATURE: 98 F | BODY MASS INDEX: 48.82 KG/M2 | SYSTOLIC BLOOD PRESSURE: 131 MMHG | RESPIRATION RATE: 24 BRPM | HEART RATE: 84 BPM | DIASTOLIC BLOOD PRESSURE: 78 MMHG | OXYGEN SATURATION: 97 % | HEIGHT: 65 IN

## 2024-01-31 DIAGNOSIS — R05.8 POST-VIRAL COUGH SYNDROME: Primary | ICD-10-CM

## 2024-01-31 DIAGNOSIS — M94.0 COSTOCHONDRITIS: ICD-10-CM

## 2024-01-31 PROCEDURE — 99214 OFFICE O/P EST MOD 30 MIN: CPT | Mod: GC

## 2024-01-31 RX ORDER — ACETAMINOPHEN 500 MG
TABLET ORAL
Qty: 100 TABLET | Refills: 0 | Status: SHIPPED | OUTPATIENT
Start: 2024-01-31 | End: 2024-03-20

## 2024-01-31 RX ORDER — BENZONATATE 100 MG/1
100 CAPSULE ORAL 3 TIMES DAILY PRN
Qty: 30 CAPSULE | Refills: 1 | Status: SHIPPED | OUTPATIENT
Start: 2024-01-31

## 2024-01-31 RX ORDER — FLUTICASONE PROPIONATE 50 MCG
1 SPRAY, SUSPENSION (ML) NASAL DAILY
Qty: 9.9 ML | Refills: 3 | Status: SHIPPED | OUTPATIENT
Start: 2024-01-31

## 2024-01-31 RX ORDER — IBUPROFEN 200 MG
800 TABLET ORAL EVERY 8 HOURS PRN
Qty: 100 TABLET | Refills: 0 | Status: SHIPPED | OUTPATIENT
Start: 2024-01-31 | End: 2024-03-20

## 2024-01-31 NOTE — PROGRESS NOTES
Preceptor Attestation:    I discussed the patient with the resident and evaluated the patient in person. I have verified the content of the note, which accurately reflects my assessment of the patient and the plan of care.   Supervising Physician:  Jevon Vitale MD.

## 2024-01-31 NOTE — PROGRESS NOTES
"  Assessment & Plan     Post-viral cough syndrome  S/p viral URI with symptom onset 1/11/24, symptoms have improved except productive cough persists and has causes a costochondritis flare. Vitally stable, SpO2 97%, lungs CTAB.   - Discussed timing of post viral cough, which can last up to 4-8 weeks  - Discussed conservative therapies, and will trial Tessalon and Flonase nasal spray  - benzonatate (TESSALON) 100 MG capsule  Dispense: 30 capsule; Refill: 1  - fluticasone (FLONASE) 50 MCG/ACT nasal spray  Dispense: 9.9 mL; Refill: 3    Costochondritis  Recent flare since she has been coughing. No cardiac symptoms, exam consistent with costochondritis, ruled out cardiac causes. Diclofenac has helped in the past.   - acetaminophen (TYLENOL) 500 MG tablet  Dispense: 100 tablet; Refill: 0  - ibuprofen (ADVIL/MOTRIN) 200 MG tablet  Dispense: 100 tablet; Refill: 0  - diclofenac (VOLTAREN) 50 MG EC tablet  Dispense: 30 tablet; Refill: 0  - diclofenac (VOLTAREN) 1 % topical gel  Dispense: 150 g; Refill: 3    Return if symptoms worsen or fail to improve.    Blair Jaramillo is a 43 year old, presenting for the following health issues:  Cough (Sick on the 11th (January) cough has been lingering all month and having SOB with chest pain - poss chest x-ray? A deep like cough - no flem or mucus occurring. Nancy tastes like \"puss\" - or infection inside. )        1/31/2024     1:26 PM   Additional Questions   Roomed by FANG Portillo MA   Accompanied by Partner - Vinod CURTIS   Ella Briggs is a 43 year old female with PMH chronic pain, HTN, obesity here for evaluation of cough and chest tightness. URI symptoms on 1/11, called in to work on 1/12, sick throught he entire weekend, home COVID negative. Symptoms have improved, but cough persists. Cough is productive, whiteish. Non bloody. Feels some shortness of breath when she coughs, but otherwise does not feel short of breath. She does feel chest pain when she pushes on her chest " "similar to previous flares of costochondritis, and would like a medication refill to help manage her flare. Has taken NSAIDs and Tylenol in the past, which have helped.     Review of Systems  Constitutional, HEENT, cardiovascular, pulmonary, gi and gu systems are negative, except as otherwise noted.      Objective    /78   Pulse 84   Temp 98  F (36.7  C) (Oral)   Resp 24   Ht 1.651 m (5' 5\")   Wt (!) 154.2 kg (340 lb)   SpO2 97%   BMI 56.58 kg/m    Body mass index is 56.58 kg/m .  Physical Exam   GENERAL: alert and no distress  HENT: nose and mouth without ulcers or lesions, mild post nasal drainage  RESP: lungs clear to auscultation - no rales, rhonchi or wheezes  CV: regular rate and rhythm, normal S1 S2, no S3 or S4, no murmur, click or rub, no peripheral edema  ABDOMEN: soft, nontender, no hepatosplenomegaly, no masses and bowel sounds normal  MS: no gross musculoskeletal defects noted, no edema; TTP right costochondral junction        Discussed with attending, Dr. Vitale.   Signed Electronically by: Kathi Schmidt, DO PGY2    "

## 2024-02-04 ENCOUNTER — HEALTH MAINTENANCE LETTER (OUTPATIENT)
Age: 44
End: 2024-02-04

## 2024-03-19 ENCOUNTER — OFFICE VISIT (OUTPATIENT)
Dept: FAMILY MEDICINE | Facility: CLINIC | Age: 44
End: 2024-03-19
Payer: COMMERCIAL

## 2024-03-19 VITALS
WEIGHT: 293 LBS | DIASTOLIC BLOOD PRESSURE: 94 MMHG | SYSTOLIC BLOOD PRESSURE: 155 MMHG | OXYGEN SATURATION: 97 % | HEART RATE: 97 BPM | TEMPERATURE: 98.5 F | HEIGHT: 66 IN | RESPIRATION RATE: 20 BRPM | BODY MASS INDEX: 47.09 KG/M2

## 2024-03-19 DIAGNOSIS — Z30.42 ENCOUNTER FOR SURVEILLANCE OF INJECTABLE CONTRACEPTIVE: ICD-10-CM

## 2024-03-19 DIAGNOSIS — Z00.00 ROUTINE GENERAL MEDICAL EXAMINATION AT A HEALTH CARE FACILITY: ICD-10-CM

## 2024-03-19 DIAGNOSIS — R07.89 ATYPICAL CHEST PAIN: ICD-10-CM

## 2024-03-19 DIAGNOSIS — Z00.00 HEALTHCARE MAINTENANCE: ICD-10-CM

## 2024-03-19 DIAGNOSIS — Z12.31 VISIT FOR SCREENING MAMMOGRAM: Primary | ICD-10-CM

## 2024-03-19 LAB — HBA1C MFR BLD: 5.5 % (ref 0–5.6)

## 2024-03-19 PROCEDURE — 86706 HEP B SURFACE ANTIBODY: CPT | Performed by: FAMILY MEDICINE

## 2024-03-19 PROCEDURE — 96372 THER/PROPH/DIAG INJ SC/IM: CPT | Performed by: FAMILY MEDICINE

## 2024-03-19 PROCEDURE — 80048 BASIC METABOLIC PNL TOTAL CA: CPT | Performed by: FAMILY MEDICINE

## 2024-03-19 PROCEDURE — 99396 PREV VISIT EST AGE 40-64: CPT | Mod: 25 | Performed by: FAMILY MEDICINE

## 2024-03-19 PROCEDURE — 87340 HEPATITIS B SURFACE AG IA: CPT | Performed by: FAMILY MEDICINE

## 2024-03-19 PROCEDURE — 83036 HEMOGLOBIN GLYCOSYLATED A1C: CPT | Performed by: FAMILY MEDICINE

## 2024-03-19 PROCEDURE — 80061 LIPID PANEL: CPT | Performed by: FAMILY MEDICINE

## 2024-03-19 PROCEDURE — 36415 COLL VENOUS BLD VENIPUNCTURE: CPT | Performed by: FAMILY MEDICINE

## 2024-03-19 RX ORDER — KETOROLAC TROMETHAMINE 10 MG/1
10 TABLET, FILM COATED ORAL 2 TIMES DAILY PRN
Qty: 10 TABLET | Refills: 0 | Status: SHIPPED | OUTPATIENT
Start: 2024-03-19 | End: 2024-06-12

## 2024-03-19 RX ORDER — MEDROXYPROGESTERONE ACETATE 150 MG/ML
150 INJECTION, SUSPENSION INTRAMUSCULAR
Status: ACTIVE | OUTPATIENT
Start: 2024-03-19 | End: 2025-03-14

## 2024-03-19 RX ADMIN — MEDROXYPROGESTERONE ACETATE 150 MG: 150 INJECTION, SUSPENSION INTRAMUSCULAR at 16:03

## 2024-03-19 SDOH — HEALTH STABILITY: PHYSICAL HEALTH
ON AVERAGE, HOW MANY DAYS PER WEEK DO YOU ENGAGE IN MODERATE TO STRENUOUS EXERCISE (LIKE A BRISK WALK)?: PATIENT DECLINED

## 2024-03-19 SDOH — HEALTH STABILITY: PHYSICAL HEALTH: ON AVERAGE, HOW MANY MINUTES DO YOU ENGAGE IN EXERCISE AT THIS LEVEL?: PATIENT DECLINED

## 2024-03-19 ASSESSMENT — SOCIAL DETERMINANTS OF HEALTH (SDOH): HOW OFTEN DO YOU GET TOGETHER WITH FRIENDS OR RELATIVES?: ONCE A WEEK

## 2024-03-19 NOTE — PROGRESS NOTES
Preventive Care Visit  [facility]  Nydia English MD, Family Medicine  Mar 19, 2024    Assessment & Plan     43 year old female presenting for preventative visit.    Cancer screening:  -- Cervical:  Last Pap smear (3/15/23), normal cytology and negative HPV.  Recommend repeat co-testing in 5 years (2028).  -- Breast:  Last mammogram (8/31/20) negative for malignancy.  Ordered.  -- Colon: Recommend starting at age 45.    Disease screening:  -- Diabetes screen: No previous screening.  Recommend A1c.  Ordered.  -- Lipids screen:  in 2015.  Recommend updating lipids.  Ordered.  -- HIV screening: Negative 10/30/12.  -- Hep C screening: Negative 1/24/23.    Immunizations:  -- TDaP: Up-to-date (1/24/23).  -- Pneumococcal: Not indicated.  -- Flu: Recommend annually during flu season.  -- COVID: Offered updated booster.  Declined.    Other:  -- Depo renewed today.  Has elevated blood pressure, but not >160 SBP.  150 mg IM q90d x4.  -- HTN.  Continue lisinopril.  Update BMP.    Counseling  Appropriate preventive services were discussed with this patient, including applicable screening as appropriate for fall prevention, nutrition, physical activity, Tobacco-use cessation, weight loss and cognition.  Checklist reviewing preventive services available has been given to the patient.  Reviewed patient's diet, addressing concerns and/or questions.     Return in about 53 weeks (around 3/25/2025) for Annual Wellness Visit.    Blair Jaramillo is a 43 year old, presenting for the following:  Physical and Contraception (Renew depo shot )        3/19/2024     2:37 PM   Additional Questions   Roomed by    Accompanied by Partner         3/19/2024    Information    services provided? No        Health Care Directive  Patient does not have a Health Care Directive or Living Will.    Contraception          3/19/2024   General Health   How would you rate your overall physical  health? Good   Feel stress (tense, anxious, or unable to sleep) To some extent   (!) STRESS CONCERN      3/19/2024   Nutrition   Three or more servings of calcium each day? Yes   Diet: Regular (no restrictions)   How many servings of fruit and vegetables per day? 4 or more   How many sweetened beverages each day? 0-1         3/19/2024   Exercise   Days per week of moderate/strenous exercise Patient declined   Average minutes spent exercising at this level Patient declined         3/19/2024   Social Factors   Frequency of gathering with friends or relatives Once a week   Worry food won't last until get money to buy more No   Food not last or not have enough money for food? No   Do you have housing?  Yes   Are you worried about losing your housing? No   Lack of transportation? No   Unable to get utilities (heat,electricity)? No         3/19/2024   Dental   Dentist two times every year? Yes         3/19/2024   TB Screening   Were you born outside of the [place]? No     Today's PHQ-2 Score:       3/19/2024     2:38 PM   PHQ-2 ( 1999 [employer])   Q1: Little interest or pleasure in doing things 0   Q2: Feeling down, depressed or hopeless 0   PHQ-2 Score 0         3/19/2024   Substance Use   Alcohol more than 3/day or more than 7/wk No   Do you use any other substances recreationally? (!) CANNABIS PRODUCTS     Social History     Tobacco Use    Smoking status: Former    Smokeless tobacco: Never   Substance Use Topics    Alcohol use: Not Currently    Drug use: Yes     Types: Marijuana         3/19/2024   STI Screening   New sexual partner(s) since last STI/HIV test? No         Latest Ref Rng & Units 3/15/2023     3:08 PM   PAP / HPV   PAP  Negative for Intraepithelial Lesion or Malignancy (NILM)    HPV 16 DNA Negative Negative    HPV 18 DNA Negative Negative    Other HR HPV Negative Negative      ASCVD Risk   The ASCVD Risk score (Yeimy MCGINNIS, et al., 2019) failed to calculate for the following reasons:    Cannot find a  previous HDL lab    Cannot find a previous total cholesterol lab        3/19/2024   Contraception/Family Planning   Questions about contraception or family planning No     Reviewed and updated as needed this visit by Provider                    BP Readings from Last 3 Encounters:   03/19/24 (!) 155/94   01/31/24 131/78   12/11/23 (!) 151/93    Wt Readings from Last 3 Encounters:   03/19/24 (!) 156.5 kg (345 lb)   01/31/24 (!) 154.2 kg (340 lb)   03/17/23 (!) 156.5 kg (345 lb)                  Patient Active Problem List   Diagnosis    Need for prophylactic vaccination with measles-mumps-rubella (MMR) vaccine    Obesity    Tendinopathy of rotator cuff    Left knee pain    Benign essential hypertension    Contraception    Chronic pain syndrome    Problems related to other legal circumstances    Degenerative disc disease, lumbar    Medical cannabis use    Morbid obesity (H)     Past Surgical History:   Procedure Laterality Date    NO HISTORY OF SURGERY      NO PAST SURGERIES         Social History     Tobacco Use    Smoking status: Former    Smokeless tobacco: Never   Substance Use Topics    Alcohol use: Not Currently     Family History   Problem Relation Age of Onset    Diabetes Father     Hypertension Father     Diabetes Maternal Grandfather     Hypertension Maternal Grandfather     C.A.D. Maternal Grandfather     Depression Son         oldest son    Depression Other     Asthma Son         youngest    Cancer Mother         ovarian CA  as a teenager    Coronary Artery Disease No family hx of     Hyperlipidemia No family hx of     Cerebrovascular Disease No family hx of     Breast Cancer No family hx of     Colon Cancer No family hx of     Prostate Cancer No family hx of     Other Cancer No family hx of     Anxiety Disorder No family hx of     Mental Illness No family hx of     Substance Abuse No family hx of     Anesthesia Reaction No family hx of     Osteoporosis No family hx of     Genetic Disorder No family hx of      Thyroid Disease No family hx of     Obesity No family hx of     Unknown/Adopted No family hx of     Heart Disease No family hx of     Coronary Artery Disease Maternal Grandfather     Ovarian Cancer Mother          Current Outpatient Medications   Medication Sig Dispense Refill    benzonatate (TESSALON) 100 MG capsule Take 1 capsule (100 mg) by mouth 3 times daily as needed for cough 30 capsule 1    diclofenac (VOLTAREN) 1 % topical gel Apply 2 g topically 4 times daily 150 g 3    diclofenac (VOLTAREN) 50 MG EC tablet Take 1 tablet (50 mg) by mouth 2 times daily as needed for moderate pain 30 tablet 0    diclofenac (VOLTAREN) 50 MG EC tablet TAKE 1 TABLET(50 MG) BY MOUTH TWICE DAILY FOR 15 DAYS 30 tablet 0    famotidine (PEPCID) 20 MG tablet Take 1 tablet (20 mg) by mouth 2 times daily as needed (heartburn) 60 tablet 1    fluticasone (FLONASE) 50 MCG/ACT nasal spray Spray 1 spray into both nostrils daily 9.9 mL 3    hydrOXYzine (ATARAX) 50 MG tablet Take 1-2 tablets ( mg) by mouth nightly as needed (sleep) 60 tablet 1    imiquimod (ALDARA) 5 % external cream Apply a small sized amount to warts or molluscum three times weekly at bedtime.   Wash off after 8 hours.   May use for up to 16 weeks. 12 packet 3    indomethacin (INDOCIN) 50 MG capsule TAKE 1 CAPSULE(50 MG) BY MOUTH TWICE DAILY WITH MEALS FOR 5 DAYS 10 capsule 0    ketorolac (TORADOL) 10 MG tablet Take 1 tablet (10 mg) by mouth 2 times daily as needed for moderate pain 10 tablet 0    lisinopril (ZESTRIL) 40 MG tablet TAKE 1 TABLET(40 MG) BY MOUTH DAILY 90 tablet 3    medical cannabis (Patient's own supply.  Not a prescription) (This is NOT a prescription, and does not certify that the patient has a qualifying medical condition for medical cannabis.  The purpose of this order is  to document that the patient reports taking medical cannabis.) 0 Information only 0    Skin Protectants, Misc. (EUCERIN) cream Apply  topically as needed for dry skin. 500 g 1  "   acetaminophen (TYLENOL) 500 MG tablet TAKE 1-2 TABLETS BY MOUTH THREE TIMES DAILY AS NEEDED FOR MILD PAIN Strength: 500 mg 100 tablet 0    ibuprofen (ADVIL/MOTRIN) 200 MG tablet Take 4 tablets (800 mg) by mouth every 8 hours as needed for mild pain or moderate pain DO NOT take at the same time as diclofenac 100 tablet 0    topiramate (TOPAMAX) 50 MG tablet Take 1 tablet (50 mg) by mouth 2 times daily 180 tablet 0     Allergies   Allergen Reactions    Nkda [No Known Drug Allergy]      Recent Labs   Lab Test 03/19/24  1539 03/17/23  1456 01/24/23  1641 05/21/21  0446 10/11/17  1553   A1C 5.5  --   --   --   --    *  --   --   --   --    HDL 43*  --   --   --   --    TRIG 128  --   --   --   --    CR 0.75 0.81   < > 0.71 0.7   GFRESTIMATED >90 >90   < > >60 >90   GFRESTBLACK  --   --   --  >60 >90   POTASSIUM 3.8 3.8   < > 3.5 3.8    < > = values in this interval not displayed.        Objective    Exam  BP (!) 155/94 (BP Location: Right arm, Patient Position: Sitting, Cuff Size: Adult Regular)   Pulse 97   Temp 98.5  F (36.9  C) (Tympanic)   Resp 20   Ht 1.674 m (5' 5.9\")   Wt (!) 156.5 kg (345 lb)   SpO2 97%   BMI 55.85 kg/m     Estimated body mass index is 55.85 kg/m  as calculated from the following:    Height as of this encounter: 1.674 m (5' 5.9\").    Weight as of this encounter: 156.5 kg (345 lb).    Physical Exam  GENERAL: alert and no distress  NECK: no adenopathy, no asymmetry, masses, or scars  RESP: lungs clear to auscultation - no rales, rhonchi or wheezes  CV: regular rate and rhythm, normal S1 S2, no S3 or S4, no murmur, click or rub, no peripheral edema  ABDOMEN: soft, nontender, no hepatosplenomegaly, no masses and bowel sounds normal  MS: no gross musculoskeletal defects noted, no edema    Signed Electronically by: Nydia Englsih MD  "

## 2024-03-19 NOTE — PROGRESS NOTES
Clinic Administered Medication Documentation        Patient was given Depo Provera. Prior to medication administration, verified patient's identity using patient s name and date of birth. Please see MAR and medication order for additional information. Patient instructed to remain in clinic for 15 minutes and report any adverse reaction to staff immediately.    Vial/Syringe: Single dose vial. Was entire vial of medication used? Yes    NEXT INJECTION DUE: 6/14/24-7/12/24    Name of provider who requested the medication administration: Dr. English   Name of provider on site (faculty or community preceptor) at the time of performing the medication administration:      Date of next administration: 6/14/24-7/12/24  Date of next office visit with provider to renew medication plan (must be seen annually): 3/19/2025

## 2024-03-20 ENCOUNTER — MYC REFILL (OUTPATIENT)
Dept: FAMILY MEDICINE | Facility: CLINIC | Age: 44
End: 2024-03-20
Payer: COMMERCIAL

## 2024-03-20 DIAGNOSIS — R63.2 BINGE EATING: ICD-10-CM

## 2024-03-20 DIAGNOSIS — M94.0 COSTOCHONDRITIS: ICD-10-CM

## 2024-03-20 LAB
ANION GAP SERPL CALCULATED.3IONS-SCNC: 10 MMOL/L (ref 7–15)
BUN SERPL-MCNC: 10.7 MG/DL (ref 6–20)
CALCIUM SERPL-MCNC: 9.2 MG/DL (ref 8.6–10)
CHLORIDE SERPL-SCNC: 105 MMOL/L (ref 98–107)
CHOLEST SERPL-MCNC: 171 MG/DL
CREAT SERPL-MCNC: 0.75 MG/DL (ref 0.51–0.95)
DEPRECATED HCO3 PLAS-SCNC: 25 MMOL/L (ref 22–29)
EGFRCR SERPLBLD CKD-EPI 2021: >90 ML/MIN/1.73M2
FASTING STATUS PATIENT QL REPORTED: ABNORMAL
GLUCOSE SERPL-MCNC: 99 MG/DL (ref 70–99)
HBV SURFACE AB SERPL IA-ACNC: <3.5 M[IU]/ML
HBV SURFACE AB SERPL IA-ACNC: NONREACTIVE M[IU]/ML
HBV SURFACE AG SERPL QL IA: NONREACTIVE
HDLC SERPL-MCNC: 43 MG/DL
LDLC SERPL CALC-MCNC: 102 MG/DL
NONHDLC SERPL-MCNC: 128 MG/DL
POTASSIUM SERPL-SCNC: 3.8 MMOL/L (ref 3.4–5.3)
SODIUM SERPL-SCNC: 140 MMOL/L (ref 135–145)
TRIGL SERPL-MCNC: 128 MG/DL

## 2024-03-22 RX ORDER — IBUPROFEN 200 MG
800 TABLET ORAL EVERY 8 HOURS PRN
Qty: 100 TABLET | Refills: 0 | Status: SHIPPED | OUTPATIENT
Start: 2024-03-22 | End: 2024-06-12

## 2024-03-22 RX ORDER — TOPIRAMATE 50 MG/1
50 TABLET, FILM COATED ORAL 2 TIMES DAILY
Qty: 180 TABLET | Refills: 0 | Status: SHIPPED | OUTPATIENT
Start: 2024-03-22

## 2024-03-22 RX ORDER — ACETAMINOPHEN 500 MG
TABLET ORAL
Qty: 100 TABLET | Refills: 0 | Status: SHIPPED | OUTPATIENT
Start: 2024-03-22 | End: 2024-06-12

## 2024-03-24 NOTE — PATIENT INSTRUCTIONS
Preventive Care Advice   This is general advice given by our system to help you stay healthy. However, your care team may have specific advice just for you. Please talk to your care team about your preventive care needs.  Nutrition  Eat 5 or more servings of fruits and vegetables each day.  Try wheat bread, brown rice and whole grain pasta (instead of white bread, rice, and pasta).  Get enough calcium and vitamin D. Check the label on foods and aim for 100% of the RDA (recommended daily allowance).  Lifestyle  Exercise at least 150 minutes each week   (30 minutes a day, 5 days a week).  Do muscle strengthening activities 2 days a week. These help control your weight and prevent disease.  No smoking.  Wear sunscreen to prevent skin cancer.  Have a dental exam and cleaning every 6 months.  Yearly exams  See your health care team every year to talk about:  Any changes in your health.  Any medicines your care team has prescribed.  Preventive care, family planning, and ways to prevent chronic diseases.  Shots (vaccines)   HPV shots (up to age 26), if you've never had them before.  Hepatitis B shots (up to age 59), if you've never had them before.  COVID-19 shot: Get this shot when it's due.  Flu shot: Get a flu shot every year.  Tetanus shot: Get a tetanus shot every 10 years.  Pneumococcal, hepatitis A, and RSV shots: Ask your care team if you need these based on your risk.  Shingles shot (for age 50 and up).  General health tests  Diabetes screening:  Starting at age 35, Get screened for diabetes at least every 3 years.  If you are younger than age 35, ask your care team if you should be screened for diabetes.  Cholesterol test: At age 39, start having a cholesterol test every 5 years, or more often if advised.  Bone density scan (DEXA): At age 50, ask your care team if you should have this scan for osteoporosis (brittle bones).  Hepatitis C: Get tested at least once in your life.  STIs (sexually transmitted  infections)  Before age 24: Ask your care team if you should be screened for STIs.  After age 24: Get screened for STIs if you're at risk. You are at risk for STIs (including HIV) if:  You are sexually active with more than one person.  You don't use condoms every time.  You or a partner was diagnosed with a sexually transmitted infection.  If you are at risk for HIV, ask about PrEP medicine to prevent HIV.  Get tested for HIV at least once in your life, whether you are at risk for HIV or not.  Cancer screening tests  Cervical cancer screening: If you have a cervix, begin getting regular cervical cancer screening tests at age 21. Most people who have regular screenings with normal results can stop after age 65. Talk about this with your provider.  Breast cancer scan (mammogram): If you've ever had breasts, begin having regular mammograms starting at age 40. This is a scan to check for breast cancer.  Colon cancer screening: It is important to start screening for colon cancer at age 45.  Have a colonoscopy test every 10 years (or more often if you're at risk) Or, ask your provider about stool tests like a FIT test every year or Cologuard test every 3 years.  To learn more about your testing options, visit: https://www.[employer]/946234.pdf.  For help making a decision, visit: https://bit.ly/if21235.  Prostate cancer screening test: If you have a prostate and are age 55 to 69, ask your provider if you would benefit from a yearly prostate cancer screening test.  Lung cancer screening: If you are a current or former smoker age 50 to 80, ask your care team if ongoing lung cancer screenings are right for you.  For informational purposes only. Not to replace the advice of your health care provider. Copyright   2023 [employer]. All rights reserved. Clinically reviewed by the [facility] Transitions Program. [employer] 541843 - REV 01/24.    Learning About Stress  What is stress?     Stress is your  body's response to a hard situation. Your body can have a physical, emotional, or mental response. Stress is a fact of life for most people, and it affects everyone differently. What causes stress for you may not be stressful for someone else.  A lot of things can cause stress. You may feel stress when you go on a job interview, take a test, or run a race. This kind of short-term stress is normal and even useful. It can help you if you need to work hard or react quickly. For example, stress can help you finish an important job on time.  Long-term stress is caused by ongoing stressful situations or events. Examples of long-term stress include long-term health problems, ongoing problems at work, or conflicts in your family. Long-term stress can harm your health.  How does stress affect your health?  When you are stressed, your body responds as though you are in danger. It makes hormones that speed up your heart, make you breathe faster, and give you a burst of energy. This is called the fight-or-flight stress response. If the stress is over quickly, your body goes back to normal and no harm is done.  But if stress happens too often or lasts too long, it can have bad effects. Long-term stress can make you more likely to get sick, and it can make symptoms of some diseases worse. If you tense up when you are stressed, you may develop neck, shoulder, or low back pain. Stress is linked to high blood pressure and heart disease.  Stress also harms your emotional health. It can make you lepe, tense, or depressed. Your relationships may suffer, and you may not do well at work or school.  What can you do to manage stress?  You can try these things to help manage stress:   Do something active. Exercise or activity can help reduce stress. Walking is a great way to get started. Even everyday activities such as housecleaning or yard work can help.  Try yoga or eric chi. These techniques combine exercise and meditation. You may need  some training at first to learn them.  Do something you enjoy. For example, listen to music or go to a movie. Practice your hobby or do volunteer work.  Meditate. This can help you relax, because you are not worrying about what happened before or what may happen in the future.  Do guided imagery. Imagine yourself in any setting that helps you feel calm. You can use online videos, books, or a teacher to guide you.  Do breathing exercises. For example:  From a standing position, bend forward from the waist with your knees slightly bent. Let your arms dangle close to the floor.  Breathe in slowly and deeply as you return to a standing position. Roll up slowly and lift your head last.  Hold your breath for just a few seconds in the standing position.  Breathe out slowly and bend forward from the waist.  Let your feelings out. Talk, laugh, cry, and express anger when you need to. Talking with supportive friends or family, a counselor, or a mily leader about your feelings is a healthy way to relieve stress. Avoid discussing your feelings with people who make you feel worse.  Write. It may help to write about things that are bothering you. This helps you find out how much stress you feel and what is causing it. When you know this, you can find better ways to cope.  What can you do to prevent stress?  You might try some of these things to help prevent stress:  Manage your time. This helps you find time to do the things you want and need to do.  Get enough sleep. Your body recovers from the stresses of the day while you are sleeping.  Get support. Your family, friends, and community can make a difference in how you experience stress.  Limit your news feed. Avoid or limit time on social media or news that may make you feel stressed.  Do something active. Exercise or activity can help reduce stress. Walking is a great way to get started.  Where can you learn more?  Go to https://www.healthwise.net/patiented  Enter N032 in the  "search box to learn more about \"Learning About Stress.\"  Current as of: October 24, 2023               Content Version: 14.0    6867-8793 [employer].   Care instructions adapted under license by your healthcare professional. If you have questions about a medical condition or this instruction, always ask your healthcare professional. [employer] disclaims any warranty or liability for your use of this information.      Substance Use Disorder: Care Instructions  Overview     You can improve your life and health by stopping your use of alcohol or drugs. When you don't drink or use drugs, you may feel and sleep better. You may get along better with your family, friends, and coworkers. There are medicines and programs that can help with substance use disorder.  How can you care for yourself at home?  Here are some ways to help you stay sober and prevent relapse.  If you have been given medicine to help keep you sober or reduce your cravings, be sure to take it exactly as prescribed.  Talk to your doctor about programs that can help you stop using drugs or drinking alcohol.  Do not keep alcohol or drugs in your home.  Plan ahead. Think about what you'll say if other people ask you to drink or use drugs. Try not to spend time with people who drink or use drugs.  Use the time and money spent on drinking or drugs to do something that's important to you.  Preventing a relapse  Have a plan to deal with relapse. Learn to recognize changes in your thinking that lead you to drink or use drugs. Get help before you start to drink or use drugs again.  Try to stay away from situations, friends, or places that may lead you to drink or use drugs.  If you feel the need to drink alcohol or use drugs again, seek help right away. Call a trusted friend or family member. Some people get support from organizations such as Narcotics Anonymous or [employer] or from treatment facilities.  If you relapse, get help " as soon as you can. Some people make a plan with another person that outlines what they want that person to do for them if they relapse. The plan usually includes how to handle the relapse and who to notify in case of relapse.  Don't give up. Remember that a relapse doesn't mean that you have failed. Use the experience to learn the triggers that lead you to drink or use drugs. Then quit again. Recovery is a lifelong process. Many people have several relapses before they are able to quit for good.  Follow-up care is a key part of your treatment and safety. Be sure to make and go to all appointments, and call your doctor if you are having problems. It's also a good idea to know your test results and keep a list of the medicines you take.  When should you call for help?   Call 911  anytime you think you may need emergency care. For example, call if you or someone else:    Has overdosed or has withdrawal signs. Be sure to tell the emergency workers that you are or someone else is using or trying to quit using drugs. Overdose or withdrawal signs may include:  Losing consciousness.  Seizure.  Seeing or hearing things that aren't there (hallucinations).     Is thinking or talking about suicide or harming others.   Where to get help 24 hours a day, 7 days a week   If you or someone you know talks about suicide, self-harm, a mental health crisis, a substance use crisis, or any other kind of emotional distress, get help right away. You can:    Call the Suicide and Crisis Lifeline at 984.     Call 2-413-861-TALK (1-927.919.5404).     Text HOME to 986836 to access the Crisis Text Line.   Consider saving these numbers in your phone.  Go to [employer] for more information or to chat online.  Call your doctor now or seek immediate medical care if:    You are having withdrawal symptoms. These may include nausea or vomiting, sweating, shakiness, and anxiety.   Watch closely for changes in your health, and be sure to contact  "your doctor if:    You have a relapse.     You need more help or support to stop.   Where can you learn more?  Go to https://www.[employer]TLabs.net/patiented  Enter H573 in the search box to learn more about \"Substance Use Disorder: Care Instructions.\"  Current as of: November 15, 2023               Content Version: 14.0    7051-5327 [employer].   Care instructions adapted under license by your healthcare professional. If you have questions about a medical condition or this instruction, always ask your healthcare professional. [employer] disclaims any warranty or liability for your use of this information.      "

## 2024-03-25 NOTE — RESULT ENCOUNTER NOTE
The 10-year ASCVD risk score (Yeimy MCGINNIS, et al., 2019) is: 1.5%    Values used to calculate the score:      Age: 43 years      Sex: Female      Is Non- : No      Diabetic: No      Tobacco smoker: No      Systolic Blood Pressure: 155 mmHg      Is BP treated: Yes      HDL Cholesterol: 43 mg/dL      Total Cholesterol: 171 mg/dL    Results for orders placed or performed in visit on 03/19/24  -Basic metabolic panel:      Status: Normal       Result                                            Value                         Ref Range                       Sodium                                            140                           135 - 145 mmol/L                Potassium                                         3.8                           3.4 - 5.3 mmol/L                Chloride                                          105                           98 - 107 mmol/L                 Carbon Dioxide (CO2)                              25                            22 - 29 mmol/L                  Anion Gap                                         10                            7 - 15 mmol/L                   Urea Nitrogen                                     10.7                          6.0 - 20.0 mg/dL                Creatinine                                        0.75                          0.51 - 0.95 mg/dL               GFR Estimate                                      >90                           >60 mL/min/1.73m2               Calcium                                           9.2                           8.6 - 10.0 mg/dL                Glucose                                           99                            70 - 99 mg/dL              -Hepatitis B Surface Antibody:      Status: None       Result                                            Value                         Ref Range                       Hepatitis B Surface Antibody                      Nonreactive                                                    Hepatitis B Surface Antibody Instrument Value     <3.50                         <8.5 m[IU]/mL              -Hepatitis B surface antigen:      Status: Normal       Result                                            Value                         Ref Range                       Hepatitis B Surface Antigen                       Nonreactive                   Nonreactive                -Hemoglobin A1c:      Status: Normal       Result                                            Value                         Ref Range                       Hemoglobin A1C                                    5.5                           0.0 - 5.6 %                -Lipid panel reflex to direct LDL Non-fasting:      Status: Abnormal       Result                                            Value                         Ref Range                       Cholesterol                                       171                           <200 mg/dL                      Triglycerides                                     128                           <150 mg/dL                      Direct Measure HDL                                43 (L)                        >=50 mg/dL                      LDL Cholesterol Calculated                        102 (H)                       <=100 mg/dL                     Non HDL Cholesterol                               128                           <130 mg/dL                      Patient Fasting > 8hrs?                           Unknown                                                      Narrative    Cholesterol    Desirable:  <200 mg/dL        Triglycerides    Normal:  Less than 150 mg/dL    Borderline High:  150-199 mg/dL    High:  200-499 mg/dL    Very High:  Greater than or equal to 500 mg/dL        Direct Measure HDL    Female:  Greater than or equal to 50 mg/dL     Male:  Greater than or equal to 40 mg/dL        LDL Cholesterol    Desirable:  <100mg/dL    Above Desirable:  100-129 mg/dL      Borderline High:  130-159 mg/dL     High:  160-189 mg/dL     Very High:  >= 190 mg/dL        Non HDL Cholesterol    Desirable:  130 mg/dL    Above Desirable:  130-159 mg/dL    Borderline High:  160-189 mg/dL    High:  190-219 mg/dL    Very High:  Greater than or equal to 220 mg/dL

## 2024-04-26 ENCOUNTER — OFFICE VISIT (OUTPATIENT)
Dept: FAMILY MEDICINE | Facility: CLINIC | Age: 44
End: 2024-04-26
Payer: COMMERCIAL

## 2024-04-26 VITALS
SYSTOLIC BLOOD PRESSURE: 125 MMHG | TEMPERATURE: 97 F | RESPIRATION RATE: 20 BRPM | BODY MASS INDEX: 47.09 KG/M2 | HEIGHT: 66 IN | OXYGEN SATURATION: 97 % | WEIGHT: 293 LBS | HEART RATE: 77 BPM | DIASTOLIC BLOOD PRESSURE: 89 MMHG

## 2024-04-26 DIAGNOSIS — M94.0 COSTOCHONDRITIS: ICD-10-CM

## 2024-04-26 DIAGNOSIS — M25.469 KNEE SWELLING: Primary | ICD-10-CM

## 2024-04-26 PROCEDURE — 99214 OFFICE O/P EST MOD 30 MIN: CPT | Performed by: FAMILY MEDICINE

## 2024-04-26 NOTE — PROGRESS NOTES
"Assessment & Plan   Hypertension.  /89 today.  On lisinopril 40.  Most recent BMP (3/19/24) with normal Cr (0.75) and K+ (3.8).  -- No changes today.  Continue monitoring of BP at home and office visits.  If not controlled, then would need second agent.    Atraumatic left knee pain.  History of tibial plateau fracture, but pain and trace edema today is localized to popliteal space.  Her primary concern is DVT.  POCUS does not show evidence of VTE, and while some edema is observed, there is no discrete Baker's cyst or drainable fluid collection.  -- DME for compressive knee sleeve.  -- Refilled PO diclofenac BID PRN.    Follow-up as needed.    Subjective   Ella Briggs is a 42 year old female with a history including obesity and HTN who presents for HTN and knee pain.    Regarding her BP, she is currently taking her daily medication of lisinopril 40 mg daily.  No issues with it.    Regarding the knee pain, she reports persistent left knee pain since falling on cement in November 2015. At that time, she was evaluated at [facility], where an MRI revealed a left tibial plateau fracture with an MCL tear. Currently, she experiences swelling after standing for extended periods, often due to her job at a , which requires her to be on her feet for about six hours a day.    The swelling and pain are more pronounced at the back of her knee, and she occasionally feels shooting pain up her leg. She is concerned that she might have a blood clot, especially after her  noticed a varicose vein on the back of her knee. She sometimes limps due to the pain. She denies any preceding injury or trauma at this time.    Social: She reports that she has quit smoking. She has never used smokeless tobacco.    Objective   Vitals: /89   Pulse 77   Temp 97  F (36.1  C) (Tympanic)   Resp 20   Ht 1.674 m (5' 5.91\")   Wt (!) 153.3 kg (338 lb)   SpO2 97%   BMI 54.71 kg/m    General: Pleasant. Young woman. " Obese. No distress.  Heart: Regular rate and rhythm. No murmurs, rubs, or gallops.  Lungs: Clear to auscultation bilaterally. No wheezes or crackles. Good air movement.  Knee: Trace effusion.  Able to bear weight.  Full ROM in extension and flexion.  No joint line tenderness. No infrapatellar tenderness.  Negative patellar apprehension.  Psych: Appropriate grooming and hygiene. Speech normal rate. Appropriate mood and affect.    Point-of-care ultrasound: 2-point evaluation of common femoral vein and popliteal vein shows compressibility of veins.  In the popliteal fossa, there is some increased cobblestoning of the soft tissue but not focal fluid collection observed.

## 2024-06-08 ENCOUNTER — MYC REFILL (OUTPATIENT)
Dept: FAMILY MEDICINE | Facility: CLINIC | Age: 44
End: 2024-06-08
Payer: COMMERCIAL

## 2024-06-08 DIAGNOSIS — M94.0 COSTOCHONDRITIS: ICD-10-CM

## 2024-06-08 DIAGNOSIS — R07.89 ATYPICAL CHEST PAIN: ICD-10-CM

## 2024-06-10 NOTE — TELEPHONE ENCOUNTER
ketorolac (TORADOL) 10 MG tablet         Sig: Take 1 tablet (10 mg) by mouth 2 times daily as needed for moderate pain    Disp: 10 tablet    Refills: 0    Start: 6/8/2024    Class: E-Prescribe    Non-formulary For: Atypical chest pain    Last ordered: 2 months ago (3/19/2024) by Nydia English MD        acetaminophen (TYLENOL) 500 MG tablet         Sig: TAKE 1-2 TABLETS BY MOUTH THREE TIMES DAILY AS NEEDED FOR MILD PAIN Strength: 500 mg    Disp: 100 tablet    Refills: 0    Start: 6/8/2024    Earliest Fill Date: 6/8/2024    Class: E-Prescribe    Non-formulary For: Costochondritis    Last ordered: 2 months ago (3/22/2024) by Nydia English MD    Analgesics (Non-Narcotic Tylenol and ASA Only) Cfycdu0606/08/2024 02:38 PM   Protocol Details Medication matches indication    Patient is 7 months old or older    Medication is active on med list    Recent (12 mo) or future (90 days) visit within the authorizing provider's specialty       ibuprofen (ADVIL/MOTRIN) 200 MG tablet         Sig: Take 4 tablets (800 mg) by mouth every 8 hours as needed for mild pain or moderate pain DO NOT take at the same time as diclofenac    Disp: 100 tablet    Refills: 0    Start: 6/8/2024    Class: E-Prescribe    Non-formulary For: Costochondritis    Last ordered: 2 months ago (3/22/2024) by Nydia English MD    NSAID Medications Weyqub8406/08/2024 02:38 PM   Protocol Details Normal CBC on file in past 12 months    Always Fail Criteria - Chart Review Required        DEEPIKA Diaz, BSN

## 2024-06-12 ENCOUNTER — MYC REFILL (OUTPATIENT)
Dept: FAMILY MEDICINE | Facility: CLINIC | Age: 44
End: 2024-06-12
Payer: COMMERCIAL

## 2024-06-12 DIAGNOSIS — M94.0 COSTOCHONDRITIS: ICD-10-CM

## 2024-06-12 DIAGNOSIS — R07.89 ATYPICAL CHEST PAIN: ICD-10-CM

## 2024-06-14 RX ORDER — ACETAMINOPHEN 500 MG
TABLET ORAL
Qty: 100 TABLET | Refills: 0 | OUTPATIENT
Start: 2024-06-14

## 2024-06-14 RX ORDER — IBUPROFEN 200 MG
800 TABLET ORAL EVERY 8 HOURS PRN
Qty: 100 TABLET | Refills: 0 | OUTPATIENT
Start: 2024-06-14

## 2024-06-14 RX ORDER — IBUPROFEN 200 MG
800 TABLET ORAL EVERY 8 HOURS PRN
Qty: 100 TABLET | Refills: 0 | Status: SHIPPED | OUTPATIENT
Start: 2024-06-14 | End: 2024-08-13

## 2024-06-14 RX ORDER — ACETAMINOPHEN 500 MG
TABLET ORAL
Qty: 100 TABLET | Refills: 0 | Status: SHIPPED | OUTPATIENT
Start: 2024-06-14 | End: 2024-08-13

## 2024-06-14 RX ORDER — KETOROLAC TROMETHAMINE 10 MG/1
10 TABLET, FILM COATED ORAL 2 TIMES DAILY PRN
Qty: 10 TABLET | Refills: 0 | OUTPATIENT
Start: 2024-06-14

## 2024-06-14 RX ORDER — KETOROLAC TROMETHAMINE 10 MG/1
10 TABLET, FILM COATED ORAL 2 TIMES DAILY PRN
Qty: 10 TABLET | Refills: 0 | Status: SHIPPED | OUTPATIENT
Start: 2024-06-14

## 2024-07-10 ENCOUNTER — OFFICE VISIT (OUTPATIENT)
Dept: FAMILY MEDICINE | Facility: CLINIC | Age: 44
End: 2024-07-10
Payer: COMMERCIAL

## 2024-07-10 VITALS — BODY MASS INDEX: 54.32 KG/M2 | WEIGHT: 293 LBS

## 2024-07-10 DIAGNOSIS — Z30.42 ENCOUNTER FOR SURVEILLANCE OF INJECTABLE CONTRACEPTIVE: Primary | ICD-10-CM

## 2024-07-10 LAB — HCG UR QL: NEGATIVE

## 2024-07-10 PROCEDURE — 81025 URINE PREGNANCY TEST: CPT

## 2024-07-10 PROCEDURE — 96372 THER/PROPH/DIAG INJ SC/IM: CPT

## 2024-07-10 RX ORDER — MEDROXYPROGESTERONE ACETATE 150 MG/ML
150 INJECTION, SUSPENSION INTRAMUSCULAR
Status: ACTIVE | OUTPATIENT
Start: 2024-07-10

## 2024-07-10 RX ADMIN — MEDROXYPROGESTERONE ACETATE 150 MG: 150 INJECTION, SUSPENSION INTRAMUSCULAR at 14:32

## 2024-07-10 NOTE — PROGRESS NOTES
Follow Up Injection    Patient returning during stated date range given at previous visit: {No, So I.....:960494}      If here at the correct interval:   BP Readings from Last 1 Encounters:   04/26/24 125/89     Wt Readings from Last 1 Encounters:   04/26/24 (!) 153.3 kg (338 lb)       Last Pap/exam date: ***      Side effects or problems with last injection?  {NO, SO I........:202857}  Date range is given to patient for next dose: ***    See Medication Note for administration information    Staff Sig: ***

## 2024-07-10 NOTE — PROGRESS NOTES
"Patient is over due for the depo shot.  Notify patient that she needed to be see by provider in order to get depo shot.  Patient is very mad, saying that, \"This should just be an in and out thing only.   Why she have to see a Dr.\".  I told her that I don't know what happen but sometime the computer generated the wrong date.  She's very mad so decline vital.       Clinic Administered Medication Documentation        Patient was given Depo Provera. Prior to medication administration, verified patient's identity using patient s name and date of birth. Please see MAR and medication order for additional information. Patient instructed to remain in clinic for 15 minutes and report any adverse reaction to staff immediately.    Vial/Syringe: Single dose vial. Was entire vial of medication used? Yes    NEXT INJECTION DUE: 9/25/24 - 10/23/24    Name of provider who requested the medication administration: Dr. Grace  Name of provider on site (faculty or community preceptor) at the time of performing the medication administration: Dr. Vitale    Date of next administration: 09/25/24 to 10/23/24  Date of next office visit with provider to renew medication plan (must be seen annually): 03/19/25    Fede Cam MA        "

## 2024-07-10 NOTE — PROGRESS NOTES
Preceptor Attestation:    I discussed the patient with the resident and evaluated the patient in person. I have verified the content of the note, which accurately reflects my assessment of the patient and the plan of care. She expressed she would contact clinic manager due to Mix up in date causing her to be late for Depo.   Supervising Physician:  Jevon Vitale MD.

## 2024-07-10 NOTE — PROGRESS NOTES
"  Assessment & Plan     Encounter for surveillance of injectable contraceptive  Patient has used Depo-Provera for contraception in the past.  It was last administered on 3/19/2024.  There is apparently some confusion regarding when she is supposed to come back for her next dose; she was under the impression that her next dose was due on 7/12/2024.  Therefore, she is presenting today due to late Depo-Provera administration.  UPT was collected and negative prior to administration.  - HCG qualitative urine  - medroxyPROGESTERone (DEPO-PROVERA) injection 150 mg    Return in about 3 months (around 10/10/2024) for next depo injection.    Blair Jaramillo is a 44 year old, presenting for the following health issues:  Imm/Inj (Depo shot)    HPI   Presenting today for Depo-Provera administration. Was asked to see the patient since she was a month late for administration and therefore needed physician visit first. She is upset about reportedly being told the wrong return date. Otherwise no questions or concerns. Would like to proceed with injection.        Objective    BP Readings from Last 1 Encounters:   04/26/24 125/89     Pulse Readings from Last 1 Encounters:   04/26/24 77     Wt Readings from Last 1 Encounters:   04/26/24 (!) 153.3 kg (338 lb)     Ht Readings from Last 1 Encounters:   04/26/24 1.674 m (5' 5.91\")     Estimated body mass index is 54.71 kg/m  as calculated from the following:    Height as of 4/26/24: 1.674 m (5' 5.91\").    Weight as of 4/26/24: 153.3 kg (338 lb).    Temp Readings from Last 1 Encounters:   04/26/24 97  F (36.1  C) (Tympanic)     Patient declined vitals today.  Vitals listed above are from last clinic visit in April 2024.    Physical Exam   GENERAL: alert and no distress  RESP: nonlabored breathing on room air, no cough  CV: skin appears well-perfused  MS: no gross musculoskeletal defects noted, no edema  SKIN: no suspicious lesions or rashes on exposed skin  NEURO: normal tone, " mentation intact and speech normal    Results for orders placed or performed in visit on 07/10/24 (from the past 24 hour(s))   HCG qualitative urine   Result Value Ref Range    hCG Urine Qualitative Negative Negative         I precepted today with Dr. Vitale.  Signed Electronically by: JESÚS SCHULTE MD

## 2024-08-13 ENCOUNTER — MYC REFILL (OUTPATIENT)
Dept: FAMILY MEDICINE | Facility: CLINIC | Age: 44
End: 2024-08-13
Payer: COMMERCIAL

## 2024-08-13 DIAGNOSIS — G47.9 SLEEP DIFFICULTIES: ICD-10-CM

## 2024-08-13 DIAGNOSIS — M94.0 COSTOCHONDRITIS: ICD-10-CM

## 2024-08-14 NOTE — TELEPHONE ENCOUNTER
hydrOXYzine HCl (ATARAX) 50 MG tablet         Sig: Take 1-2 tablets ( mg) by mouth nightly as needed (sleep)    Disp: 60 tablet    Refills: 1    Start: 8/13/2024    Class: E-Prescribe    Non-formulary For: Sleep difficulties    Last ordered: 1 year ago (1/24/2023) by Nydia English MD    Antihistamines Protocol Cmpvgc0808/13/2024 05:59 PM   Protocol Details Medication indicated for associated diagnosis    Recent (12 mo) or future (30 days) visit within the authorizing provider's specialty    Patient is age 3 or older    Medication is active on med list       acetaminophen (TYLENOL) 500 MG tablet         Sig: TAKE 1-2 TABLETS BY MOUTH THREE TIMES DAILY AS NEEDED FOR MILD PAIN Strength: 500 mg    Disp: 100 tablet    Refills: 0    Start: 8/13/2024    Earliest Fill Date: 8/13/2024    Class: E-Prescribe    Non-formulary For: Costochondritis    Last ordered: 2 months ago (6/14/2024) by Nydia English MD    Analgesics (Non-Narcotic Tylenol and ASA Only) Xpsurj0008/13/2024 05:59 PM   Protocol Details Medication matches indication    Patient is 7 months old or older    Medication is active on med list    Recent (12 mo) or future (90 days) visit within the authorizing provider's specialty       ibuprofen (ADVIL/MOTRIN) 200 MG tablet         Sig: Take 4 tablets (800 mg) by mouth every 8 hours as needed for mild pain or moderate pain DO NOT take at the same time as diclofenac    Disp: 100 tablet    Refills: 0    Start: 8/13/2024    Class: E-Prescribe    Non-formulary For: Costochondritis    Last ordered: 2 months ago (6/14/2024) by Nydia English MD    NSAID Medications Aulllg0608/13/2024 05:59 PM   Protocol Details Normal CBC on file in past 12 months    Always Fail Criteria - Chart Review Required        DEEPIKA Diaz, BSN

## 2024-08-15 RX ORDER — IBUPROFEN 200 MG
800 TABLET ORAL EVERY 8 HOURS PRN
Qty: 100 TABLET | Refills: 0 | Status: SHIPPED | OUTPATIENT
Start: 2024-08-15 | End: 2024-09-13

## 2024-08-15 RX ORDER — HYDROXYZINE HYDROCHLORIDE 50 MG/1
50-100 TABLET, FILM COATED ORAL
Qty: 60 TABLET | Refills: 1 | Status: SHIPPED | OUTPATIENT
Start: 2024-08-15

## 2024-08-15 RX ORDER — ACETAMINOPHEN 500 MG
TABLET ORAL
Qty: 100 TABLET | Refills: 0 | Status: SHIPPED | OUTPATIENT
Start: 2024-08-15 | End: 2024-09-13

## 2024-09-13 ENCOUNTER — MYC REFILL (OUTPATIENT)
Dept: FAMILY MEDICINE | Facility: CLINIC | Age: 44
End: 2024-09-13
Payer: COMMERCIAL

## 2024-09-13 DIAGNOSIS — M94.0 COSTOCHONDRITIS: ICD-10-CM

## 2024-09-16 NOTE — TELEPHONE ENCOUNTER
acetaminophen (TYLENOL) 500 MG tablet         Sig: TAKE 1-2 TABLETS BY MOUTH THREE TIMES DAILY AS NEEDED FOR MILD PAIN Strength: 500 mg    Disp: 100 tablet    Refills: 0    Start: 9/13/2024    Earliest Fill Date: 9/13/2024    Class: E-Prescribe    Non-formulary For: Costochondritis    Last ordered: 1 month ago (8/15/2024) by Nydia English MD    Analgesics (Non-Narcotic Tylenol and ASA Only) Fdhsgk0009/13/2024 12:33 PM   Protocol Details Medication matches indication    Patient is 7 months old or older    Medication is active on med list    Recent (12 mo) or future (90 days) visit within the authorizing provider's specialty       ibuprofen (ADVIL/MOTRIN) 200 MG tablet         Sig: Take 4 tablets (800 mg) by mouth every 8 hours as needed for mild pain or moderate pain. DO NOT take at the same time as diclofenac    Disp: 100 tablet    Refills: 0    Start: 9/13/2024    Class: E-Prescribe    Non-formulary For: Costochondritis    Last ordered: 1 month ago (8/15/2024) by Nydia English MD    NSAID Medications Msvibm2909/13/2024 12:33 PM   Protocol Details Normal CBC on file in past 12 months    Always Fail Criteria - Chart Review Required        DEEPIKA Diaz, BSN

## 2024-09-19 RX ORDER — IBUPROFEN 200 MG
800 TABLET ORAL EVERY 8 HOURS PRN
Qty: 100 TABLET | Refills: 0 | Status: SHIPPED | OUTPATIENT
Start: 2024-09-19

## 2024-09-19 RX ORDER — ACETAMINOPHEN 500 MG
TABLET ORAL
Qty: 100 TABLET | Refills: 0 | Status: SHIPPED | OUTPATIENT
Start: 2024-09-19

## 2024-10-23 ENCOUNTER — ALLIED HEALTH/NURSE VISIT (OUTPATIENT)
Dept: FAMILY MEDICINE | Facility: CLINIC | Age: 44
End: 2024-10-23
Payer: COMMERCIAL

## 2024-10-23 VITALS
OXYGEN SATURATION: 95 % | TEMPERATURE: 98.1 F | HEART RATE: 94 BPM | DIASTOLIC BLOOD PRESSURE: 96 MMHG | SYSTOLIC BLOOD PRESSURE: 139 MMHG | RESPIRATION RATE: 20 BRPM

## 2024-10-23 DIAGNOSIS — Z30.42 ENCOUNTER FOR DEPO-PROVERA CONTRACEPTION: Primary | ICD-10-CM

## 2024-10-23 PROCEDURE — 96372 THER/PROPH/DIAG INJ SC/IM: CPT

## 2024-10-23 PROCEDURE — 99207 PR NO CHARGE NURSE ONLY: CPT

## 2024-10-23 RX ADMIN — MEDROXYPROGESTERONE ACETATE 150 MG: 150 INJECTION, SUSPENSION INTRAMUSCULAR at 13:20

## 2024-10-23 NOTE — PROGRESS NOTES
Clinic Administered Medication Documentation      Depo Provera Documentation    Depo-Provera Standing Order inclusion/exclusion criteria reviewed.     Is this the initial or subsequent dose of Depo Provera? Subsequent dose - patient is within the acceptable window of time (11-15 weeks) for subsequent injection. Pregnancy test not indicated.    Patient meets: inclusion criteria     Is there an active order (written within the past 365 days, with administrations remaining, not ) in the chart? Yes.     Prior to injection, verified patient identity using patient's name and date of birth. Medication was administered. Please see MAR and medication order for additional information.     Vial/Syringe: Single dose vial. Was entire vial of medication used? Yes    Patient instructed to remain in clinic for 15 minutes and report any adverse reaction to staff immediately.  NEXT INJECTION DUE: 25 - 25    Verified that the patient has refills remaining in their prescription.    Name of provider who requested the medication administration: Dr. Obregon  Name of provider on site (faculty or community preceptor) at the time of performing the medication administration: Dr. Obregon    Date of next administration: 25 to 25  Date of next office visit with provider to renew medication plan (must be seen annually): 2025      Fede Cam MA

## 2025-02-05 ENCOUNTER — ALLIED HEALTH/NURSE VISIT (OUTPATIENT)
Dept: FAMILY MEDICINE | Facility: CLINIC | Age: 45
End: 2025-02-05
Payer: COMMERCIAL

## 2025-02-05 VITALS
TEMPERATURE: 98.2 F | RESPIRATION RATE: 16 BRPM | DIASTOLIC BLOOD PRESSURE: 115 MMHG | OXYGEN SATURATION: 97 % | HEART RATE: 95 BPM | SYSTOLIC BLOOD PRESSURE: 152 MMHG

## 2025-02-05 DIAGNOSIS — Z30.42 ENCOUNTER FOR SURVEILLANCE OF INJECTABLE CONTRACEPTIVE: Primary | ICD-10-CM

## 2025-02-05 RX ADMIN — MEDROXYPROGESTERONE ACETATE 150 MG: 150 INJECTION, SUSPENSION INTRAMUSCULAR at 13:56

## 2025-02-05 NOTE — PROGRESS NOTES
Clinic Administered Medication Documentation      Depo Provera Documentation    Depo-Provera Standing Order inclusion/exclusion criteria reviewed.     Is this the initial or subsequent dose of Depo Provera? Subsequent dose - patient is within the acceptable window of time (11-15 weeks) for subsequent injection. Pregnancy test not indicated.    Patient meets: inclusion criteria     Is there an active order (written within the past 365 days, with administrations remaining, not ) in the chart? Yes.     Prior to injection, verified patient identity using patient's name and date of birth. Medication was administered. Please see MAR and medication order for additional information.     Vial/Syringe: Single dose vial. Was entire vial of medication used? Yes    Patient instructed to remain in clinic for 15 minutes and report any adverse reaction to staff immediately.  NEXT INJECTION DUE: 25 - 25    Verified that the patient has refills remaining in their prescription.    Name of provider who requested the medication administration: Dr. English  Name of provider on site (faculty or community preceptor) at the time of performing the medication administration: Dr. English    Date of next administration: 25 - 25  Date of next office visit with provider to renew medication plan (must be seen annually): 07/10/2025

## 2025-02-17 ENCOUNTER — PATIENT OUTREACH (OUTPATIENT)
Dept: CARE COORDINATION | Facility: CLINIC | Age: 45
End: 2025-02-17
Payer: COMMERCIAL

## 2025-03-03 ENCOUNTER — PATIENT OUTREACH (OUTPATIENT)
Dept: CARE COORDINATION | Facility: CLINIC | Age: 45
End: 2025-03-03
Payer: COMMERCIAL

## 2025-03-05 ENCOUNTER — MYC REFILL (OUTPATIENT)
Dept: FAMILY MEDICINE | Facility: CLINIC | Age: 45
End: 2025-03-05
Payer: COMMERCIAL

## 2025-03-05 DIAGNOSIS — M94.0 COSTOCHONDRITIS: ICD-10-CM

## 2025-03-05 DIAGNOSIS — I10 BENIGN ESSENTIAL HYPERTENSION: ICD-10-CM

## 2025-03-06 NOTE — TELEPHONE ENCOUNTER
lisinopril (ZESTRIL) 40 MG tablet         Sig: Take 1 tablet (40 mg) by mouth daily.    Disp: 90 tablet    Refills: 3    Start: 3/5/2025    Class: E-Prescribe    Non-formulary For: Benign essential hypertension    Last ordered: 1 year ago (1/17/2024) by Nydia English MD    ACE Inhibitors (Including Combos) Protocol Djzkwv2203/05/2025 06:18 PM   Protocol Details Most recent blood pressure under 140/90 in past 12 months- Clinicial or Patient Reported          Wilfrid Arboleda, RN, MSN

## 2025-03-06 NOTE — TELEPHONE ENCOUNTER
acetaminophen (TYLENOL) 500 MG tablet         Sig: TAKE 1-2 TABLETS BY MOUTH THREE TIMES DAILY AS NEEDED FOR MILD PAIN Strength: 500 mg    Disp: 100 tablet    Refills: 0    Start: 3/5/2025    Earliest Fill Date: 3/5/2025    Class: E-Prescribe    Non-formulary For: Costochondritis    Last ordered: 2 months ago (1/3/2025) by April Jj MD    Analgesics (Non-Narcotic Tylenol and ASA Only) Czrrdi9603/05/2025 06:18 PM   Protocol Details Medication matches indication    Patient is 7 months old or older    Medication is active on med list and the sig matches. RN to manually verify dose and sig if red X/fail.    Recent (12 mo) or future (90 days) visit within the authorizing provider's specialty       ibuprofen (ADVIL/MOTRIN) 200 MG tablet         Sig: Take 4 tablets (800 mg) by mouth every 8 hours as needed for mild pain or moderate pain. DO NOT take at the same time as diclofenac    Disp: 100 tablet    Refills: 0    Start: 3/5/2025    Class: E-Prescribe    Non-formulary For: Costochondritis    Last ordered: 2 months ago (1/3/2025) by April Jj MD    NSAID Medications Ywgmbg4503/05/2025 06:18 PM   Protocol Details Most recent blood pressure under 140/90 in past 12 months    Normal CBC on file in past 12 months    Always Fail Criteria - Chart Review Required          Wilfrid Arboleda, RN, MSN

## 2025-03-12 ENCOUNTER — OFFICE VISIT (OUTPATIENT)
Dept: FAMILY MEDICINE | Facility: CLINIC | Age: 45
End: 2025-03-12
Payer: COMMERCIAL

## 2025-03-12 VITALS
DIASTOLIC BLOOD PRESSURE: 93 MMHG | HEIGHT: 66 IN | TEMPERATURE: 98.3 F | SYSTOLIC BLOOD PRESSURE: 142 MMHG | WEIGHT: 293 LBS | BODY MASS INDEX: 47.09 KG/M2 | RESPIRATION RATE: 20 BRPM | HEART RATE: 101 BPM | OXYGEN SATURATION: 96 %

## 2025-03-12 DIAGNOSIS — I10 BENIGN ESSENTIAL HYPERTENSION: ICD-10-CM

## 2025-03-12 DIAGNOSIS — G47.9 DIFFICULTY SLEEPING: ICD-10-CM

## 2025-03-12 DIAGNOSIS — Z30.017 INSERTION OF IMPLANTABLE SUBDERMAL CONTRACEPTIVE: Primary | ICD-10-CM

## 2025-03-12 LAB — HCG UR QL: NEGATIVE

## 2025-03-12 PROCEDURE — 11981 INSERTION DRUG DLVR IMPLANT: CPT | Performed by: FAMILY MEDICINE

## 2025-03-12 PROCEDURE — 3077F SYST BP >= 140 MM HG: CPT | Performed by: FAMILY MEDICINE

## 2025-03-12 PROCEDURE — 81025 URINE PREGNANCY TEST: CPT | Performed by: FAMILY MEDICINE

## 2025-03-12 PROCEDURE — 3080F DIAST BP >= 90 MM HG: CPT | Performed by: FAMILY MEDICINE

## 2025-03-12 RX ORDER — TRAZODONE HYDROCHLORIDE 50 MG/1
50 TABLET ORAL
Qty: 30 TABLET | Refills: 1 | Status: SHIPPED | OUTPATIENT
Start: 2025-03-12

## 2025-03-12 NOTE — PROGRESS NOTES
Procedure Note-Nexplanon Insertion    Ella Briggs is a patient of Nydia French here for placement of etonogestrel implant (Nexplanon)    Indication:Contraception    Consent: Affirmation of informed consent was signed and scanned into the medical record. Risks, benefits and alternatives were discussed. Patient's questions were elicited and answered.   Procedure safety checklist was completed:  Yes  Time Out (Pause for the Cause) completed: Yes    Labs: UPT:  Negative  Results for orders placed or performed in visit on 03/12/25   HCG qualitative urine     Status: Normal   Result Value Ref Range    hCG Urine Qualitative Negative Negative   LMP:   No LMP recorded. Patient has had an injection.       Previous Contraception:  depoprovera  Counseling:  Pt. counseled on potential side effects of  Nexplanon, including unpredictable spotting/bleeding and plan for removal/replacement of Nexplanon in 3 years or less.    Preoperative Diagnosis: Desires effective contraception  Postoperative Diagnosis: etonogestrel implant in place  Skin prep Betadine  Anesthesia 1% lidocaine    Technique:   Patient was placed supine with right arm exposed.  Norris was made 8 cm proximal to the medial epicondyl and a guiding norris 4 cm proximal to the first.  Arm was prepped with betadine. Insertion point was anesthetized with 1% lidocaine 2mL. After stretching the skin with thumb and index finger around the insertion site.  Skin punctured with the tip of the needle inserted at 30 degrees and then lowered to horizontal position. While lifting the skin with the tip of the needle was inserted its full length. Applicator was then stabilized and the purple slider was unlocked by pushing it slightly down. Slider moved back completely until it stopped. Applicator was then removed.  Correct placement of the implant was confirmed by palpation in the patient's arm and visualizing the purple top of the obturator.  Insertion site was dressed  with an adhesive covered by a pressure dressing.      User card and patient chart label filled out. User card  given to patient for record. Nexplanon added to medication list     Lot# [ [id number] ]    EBL: minimal  Complications:  No  Tolerance:  Pt tolerated procedure well and was in stable condition.     Follow up: Pt was instructed to call if bleeding, severe pain or foul smell.

## 2025-03-13 DIAGNOSIS — I10 BENIGN ESSENTIAL HYPERTENSION: ICD-10-CM

## 2025-03-13 RX ORDER — LISINOPRIL 40 MG/1
40 TABLET ORAL DAILY
Qty: 90 TABLET | Refills: 3 | Status: SHIPPED | OUTPATIENT
Start: 2025-03-13

## 2025-03-13 RX ORDER — ACETAMINOPHEN 500 MG
TABLET ORAL
Qty: 100 TABLET | Refills: 0 | Status: SHIPPED | OUTPATIENT
Start: 2025-03-13

## 2025-03-13 RX ORDER — IBUPROFEN 200 MG
800 TABLET ORAL EVERY 8 HOURS PRN
Qty: 100 TABLET | Refills: 0 | Status: SHIPPED | OUTPATIENT
Start: 2025-03-13

## 2025-03-13 NOTE — TELEPHONE ENCOUNTER
Name from pharmacy: LISINOPRIL 40MG TABLETS         Will file in chart as: lisinopril (ZESTRIL) 40 MG tablet     Possible duplicate: Ducyaakov to review recent actions on this medication    Sig: TAKE 1 TABLET(40 MG) BY MOUTH DAILY    Disp: 90 tablet    Refills: 3 (Pharmacy requested: Not specified)    Start: 3/13/2025    Class: E-Prescribe    Non-formulary For: Benign essential hypertension    Last ordered: 1 year ago (1/17/2024) by Nydia English MD    Last refill: 9/18/2024    Rx #: [id number]    ACE Inhibitors (Including Combos) Protocol Nritie4003/13/2025 05:25 AM   Protocol Details Most recent blood pressure under 140/90 in past 12 months- Clinicial or Patient Reported          Wilfrid Arboleda, RN, MSN

## 2025-04-19 ENCOUNTER — HEALTH MAINTENANCE LETTER (OUTPATIENT)
Age: 45
End: 2025-04-19

## 2025-05-16 ENCOUNTER — MYC REFILL (OUTPATIENT)
Dept: FAMILY MEDICINE | Facility: CLINIC | Age: 45
End: 2025-05-16
Payer: COMMERCIAL

## 2025-05-16 DIAGNOSIS — G47.9 DIFFICULTY SLEEPING: ICD-10-CM

## 2025-05-16 DIAGNOSIS — M94.0 COSTOCHONDRITIS: ICD-10-CM

## 2025-05-16 DIAGNOSIS — R07.89 ATYPICAL CHEST PAIN: ICD-10-CM

## 2025-05-16 NOTE — TELEPHONE ENCOUNTER
ketorolac (TORADOL) 10 MG tablet         Sig: Take 1 tablet (10 mg) by mouth 2 times daily as needed for moderate pain.    Disp: 10 tablet    Refills: 0    Start: 5/16/2025    Class: E-Prescribe    Non-formulary For: Atypical chest pain    Last ordered: 11 months ago (6/14/2024) by Nydia English MD        diclofenac (VOLTAREN) 50 MG EC tablet         Sig: Take 1 tablet (50 mg) by mouth 2 times daily as needed for moderate pain. . Do not take with ibuprofen or ketorolac.    Disp: 30 tablet    Refills: 0    Start: 5/16/2025    Class: E-Prescribe    Non-formulary For: Costochondritis    Last ordered: 11 months ago (6/14/2024) by Nydia English MD    NSAID Medications [id number]/16/2025 01:33 PM   Protocol Details Most recent blood pressure under 140/90 in past 12 months    Normal CBC on file in past 12 months    Always Fail Criteria - Chart Review Required    Normal GFR on file in past 12 months    Patient is age 6-64 years    Medication is active on med list and the sig matches. RN to manually verify dose and sig if red X/fail.    Recent (12 mo) or future (90 days) visit within the authorizing provider's specialty    No active pregnancy on record    No positive pregnancy test in past 12 months       acetaminophen (TYLENOL) 500 MG tablet         Sig: TAKE 1-2 TABLETS BY MOUTH THREE TIMES DAILY AS NEEDED FOR MILD PAIN Strength: 500 mg    Disp: 100 tablet    Refills: 0    Start: 5/16/2025    Earliest Fill Date: 5/16/2025    Class: E-Prescribe    Non-formulary For: Costochondritis    Last ordered: 2 months ago (3/13/2025) by Nydia English MD    Analgesics (Non-Narcotic Tylenol and ASA Only) [id number]/16/2025 01:33 PM   Protocol Details Patient is 7 months old or older    Medication is active on med list and the sig matches. RN to manually verify dose and sig if red X/fail.    Medication matches indication    Recent (12 mo) or future (90 days) visit within the authorizing provider's specialty       ibuprofen  (ADVIL/MOTRIN) 200 MG tablet         Sig: Take 4 tablets (800 mg) by mouth every 8 hours as needed for mild pain or moderate pain. DO NOT take at the same time as diclofenac    Disp: 100 tablet    Refills: 0    Start: 5/16/2025    Class: E-Prescribe    Non-formulary For: Costochondritis    Last ordered: 2 months ago (3/13/2025) by Nydia English MD    NSAID Medications [id number]/16/2025 01:33 PM   Protocol Details Most recent blood pressure under 140/90 in past 12 months    Normal CBC on file in past 12 months    Always Fail Criteria - Chart Review Required    Normal GFR on file in past 12 months    Patient is age 6-64 years    Medication is active on med list and the sig matches. RN to manually verify dose and sig if red X/fail.    Recent (12 mo) or future (90 days) visit within the authorizing provider's specialty    No active pregnancy on record    No positive pregnancy test in past 12 months       traZODone (DESYREL) 50 MG tablet         Sig: Take 1 tablet (50 mg) by mouth nightly as needed for sleep.    Disp: 30 tablet    Refills: 1    Start: 5/16/2025    Class: E-Prescribe    Non-formulary For: Difficulty sleeping    Last ordered: 2 months ago (3/12/2025) by Nydia English MD    Serotonin Modulators [id number]/16/2025 01:33 PM   Protocol Details Medication indicated for associated diagnosis    Medication is active on med list and the sig matches. RN to manually verify dose and sig if red X/fail.    Recent (12 mo) or future (90 days) visit within the authorizing provider's specialty    Patient is age 18 or older    No active pregnancy on record    No positive pregnancy test in past 12 months        DEEPIKA Diaz, BSN

## 2025-05-18 RX ORDER — ACETAMINOPHEN 500 MG
TABLET ORAL
Qty: 100 TABLET | Refills: 0 | Status: SHIPPED | OUTPATIENT
Start: 2025-05-18

## 2025-05-18 RX ORDER — IBUPROFEN 200 MG
800 TABLET ORAL EVERY 8 HOURS PRN
Qty: 100 TABLET | Refills: 0 | Status: SHIPPED | OUTPATIENT
Start: 2025-05-18

## 2025-05-18 RX ORDER — TRAZODONE HYDROCHLORIDE 50 MG/1
50 TABLET ORAL
Qty: 30 TABLET | Refills: 1 | Status: SHIPPED | OUTPATIENT
Start: 2025-05-18

## 2025-05-18 RX ORDER — KETOROLAC TROMETHAMINE 10 MG/1
10 TABLET, FILM COATED ORAL 2 TIMES DAILY PRN
Qty: 10 TABLET | Refills: 0 | Status: SHIPPED | OUTPATIENT
Start: 2025-05-18